# Patient Record
Sex: FEMALE | Race: WHITE | NOT HISPANIC OR LATINO | Employment: FULL TIME | URBAN - METROPOLITAN AREA
[De-identification: names, ages, dates, MRNs, and addresses within clinical notes are randomized per-mention and may not be internally consistent; named-entity substitution may affect disease eponyms.]

---

## 2020-06-23 ENCOUNTER — APPOINTMENT (OUTPATIENT)
Dept: CT IMAGING | Facility: HOSPITAL | Age: 20
End: 2020-06-23

## 2020-06-23 ENCOUNTER — APPOINTMENT (OUTPATIENT)
Dept: GENERAL RADIOLOGY | Facility: HOSPITAL | Age: 20
End: 2020-06-23

## 2020-06-23 ENCOUNTER — HOSPITAL ENCOUNTER (EMERGENCY)
Facility: HOSPITAL | Age: 20
Discharge: HOME | End: 2020-06-23
Admitting: EMERGENCY MEDICINE

## 2020-06-23 VITALS
HEART RATE: 71 BPM | HEIGHT: 64 IN | BODY MASS INDEX: 27.21 KG/M2 | DIASTOLIC BLOOD PRESSURE: 71 MMHG | OXYGEN SATURATION: 100 % | RESPIRATION RATE: 16 BRPM | TEMPERATURE: 98.3 F | WEIGHT: 159.39 LBS | SYSTOLIC BLOOD PRESSURE: 110 MMHG

## 2020-06-23 DIAGNOSIS — R55 SYNCOPE, UNSPECIFIED SYNCOPE TYPE: Primary | ICD-10-CM

## 2020-06-23 DIAGNOSIS — R42 DIZZINESS: ICD-10-CM

## 2020-06-23 LAB
ANION GAP SERPL CALCULATED.3IONS-SCNC: 16 MMOL/L (ref 5–15)
B-HCG UR QL: NEGATIVE
BACTERIA UR QL AUTO: ABNORMAL /HPF
BASOPHILS # BLD AUTO: 0.1 10*3/MM3 (ref 0–0.2)
BASOPHILS NFR BLD AUTO: 0.8 % (ref 0–1.5)
BILIRUB UR QL STRIP: NEGATIVE
BUN BLD-MCNC: 11 MG/DL (ref 6–20)
BUN BLD-MCNC: ABNORMAL MG/DL
BUN/CREAT SERPL: ABNORMAL
CALCIUM SPEC-SCNC: 9.9 MG/DL (ref 8.6–10.5)
CHLORIDE SERPL-SCNC: 101 MMOL/L (ref 98–107)
CLARITY UR: ABNORMAL
CO2 SERPL-SCNC: 23 MMOL/L (ref 22–29)
COLOR UR: YELLOW
CREAT BLD-MCNC: 0.59 MG/DL (ref 0.57–1)
DEPRECATED RDW RBC AUTO: 42 FL (ref 37–54)
EOSINOPHIL # BLD AUTO: 0 10*3/MM3 (ref 0–0.4)
EOSINOPHIL NFR BLD AUTO: 0.4 % (ref 0.3–6.2)
ERYTHROCYTE [DISTWIDTH] IN BLOOD BY AUTOMATED COUNT: 13.5 % (ref 12.3–15.4)
GFR SERPL CREATININE-BSD FRML MDRD: 129 ML/MIN/1.73
GLUCOSE BLD-MCNC: 84 MG/DL (ref 65–99)
GLUCOSE UR STRIP-MCNC: NEGATIVE MG/DL
HCT VFR BLD AUTO: 44.7 % (ref 34–46.6)
HGB BLD-MCNC: 15.4 G/DL (ref 12–15.9)
HGB UR QL STRIP.AUTO: NEGATIVE
HOLD SPECIMEN: NORMAL
HYALINE CASTS UR QL AUTO: ABNORMAL /LPF
KETONES UR QL STRIP: ABNORMAL
LEUKOCYTE ESTERASE UR QL STRIP.AUTO: ABNORMAL
LYMPHOCYTES # BLD AUTO: 2.6 10*3/MM3 (ref 0.7–3.1)
LYMPHOCYTES NFR BLD AUTO: 24.7 % (ref 19.6–45.3)
MCH RBC QN AUTO: 30.3 PG (ref 26.6–33)
MCHC RBC AUTO-ENTMCNC: 34.4 G/DL (ref 31.5–35.7)
MCV RBC AUTO: 88.1 FL (ref 79–97)
MONOCYTES # BLD AUTO: 0.8 10*3/MM3 (ref 0.1–0.9)
MONOCYTES NFR BLD AUTO: 7.3 % (ref 5–12)
NEUTROPHILS # BLD AUTO: 7 10*3/MM3 (ref 1.7–7)
NEUTROPHILS NFR BLD AUTO: 66.8 % (ref 42.7–76)
NITRITE UR QL STRIP: NEGATIVE
NRBC BLD AUTO-RTO: 0 /100 WBC (ref 0–0.2)
PH UR STRIP.AUTO: 7 [PH] (ref 5–8)
PLATELET # BLD AUTO: 305 10*3/MM3 (ref 140–450)
PMV BLD AUTO: 10.3 FL (ref 6–12)
POTASSIUM BLD-SCNC: 3.7 MMOL/L (ref 3.5–5.2)
PROT UR QL STRIP: NEGATIVE
RBC # BLD AUTO: 5.07 10*6/MM3 (ref 3.77–5.28)
RBC # UR: ABNORMAL /HPF
REF LAB TEST METHOD: ABNORMAL
SODIUM BLD-SCNC: 140 MMOL/L (ref 136–145)
SP GR UR STRIP: 1.01 (ref 1–1.03)
SQUAMOUS #/AREA URNS HPF: ABNORMAL /HPF
TROPONIN T SERPL-MCNC: <0.01 NG/ML (ref 0–0.03)
UROBILINOGEN UR QL STRIP: ABNORMAL
WBC NRBC COR # BLD: 10.5 10*3/MM3 (ref 3.4–10.8)
WBC UR QL AUTO: ABNORMAL /HPF

## 2020-06-23 PROCEDURE — 81025 URINE PREGNANCY TEST: CPT | Performed by: PHYSICIAN ASSISTANT

## 2020-06-23 PROCEDURE — 70450 CT HEAD/BRAIN W/O DYE: CPT

## 2020-06-23 PROCEDURE — 84484 ASSAY OF TROPONIN QUANT: CPT | Performed by: PHYSICIAN ASSISTANT

## 2020-06-23 PROCEDURE — 80048 BASIC METABOLIC PNL TOTAL CA: CPT | Performed by: PHYSICIAN ASSISTANT

## 2020-06-23 PROCEDURE — 99284 EMERGENCY DEPT VISIT MOD MDM: CPT

## 2020-06-23 PROCEDURE — 93005 ELECTROCARDIOGRAM TRACING: CPT | Performed by: PHYSICIAN ASSISTANT

## 2020-06-23 PROCEDURE — 81001 URINALYSIS AUTO W/SCOPE: CPT | Performed by: PHYSICIAN ASSISTANT

## 2020-06-23 PROCEDURE — 85025 COMPLETE CBC W/AUTO DIFF WBC: CPT | Performed by: PHYSICIAN ASSISTANT

## 2020-06-23 PROCEDURE — 71045 X-RAY EXAM CHEST 1 VIEW: CPT

## 2020-06-23 RX ORDER — SODIUM CHLORIDE 0.9 % (FLUSH) 0.9 %
10 SYRINGE (ML) INJECTION AS NEEDED
Status: DISCONTINUED | OUTPATIENT
Start: 2020-06-23 | End: 2020-06-23 | Stop reason: HOSPADM

## 2020-06-23 RX ORDER — MECLIZINE HYDROCHLORIDE 25 MG/1
25 TABLET ORAL 3 TIMES DAILY PRN
Qty: 15 TABLET | Refills: 0 | Status: SHIPPED | OUTPATIENT
Start: 2020-06-23 | End: 2020-09-17

## 2020-06-23 RX ORDER — MECLIZINE HYDROCHLORIDE 25 MG/1
25 TABLET ORAL ONCE
Status: COMPLETED | OUTPATIENT
Start: 2020-06-23 | End: 2020-06-23

## 2020-06-23 RX ADMIN — MECLIZINE HYDROCHLORIDE 25 MG: 25 TABLET ORAL at 14:34

## 2020-06-23 RX ADMIN — SODIUM CHLORIDE 1000 ML: 0.9 INJECTION, SOLUTION INTRAVENOUS at 14:35

## 2020-06-23 NOTE — ED PROVIDER NOTES
Subjective   History of Present Illness  Patient is a healthy 19-year-old female presents with complaints of dizziness and lightheadedness over the past week.  Patient states that it is constant feels like the room is spinning around no significant change of the dizziness with her position change.  Patient does report one syncopal episode 3 days ago states she had been feeling dizzy all day she was in her kitchen trying to make some food and started to get very nauseous and lightheaded she went to the bathroom thinking that she needed to vomit states that she was on her knees in front of the sink just wash cool water on her face in the  She remembers she woke up on the floor states she then had 2 episodes of vomiting afterwards and states she felt fine after.  She denies any urination or bowel movement on herself during her syncopal episode.  Patient also reports some associated intermittent nausea at times.  She reports some intermittent ear pain but denies any drainage that she is noted.  Patient denies any fever, body aches or chills, chest pain or shortness of breath, help palpitations, recent travel or change any medications.  Patient denies any headache, one-sided numbness weakness slurred speech or facial droop.  Review of Systems   Constitutional: Negative.    HENT: Positive for congestion and ear pain. Negative for dental problem, drooling, ear discharge, facial swelling, rhinorrhea, sinus pressure, sinus pain, sore throat and trouble swallowing.    Eyes: Negative for photophobia and visual disturbance.   Respiratory: Negative.    Cardiovascular: Negative.    Gastrointestinal: Positive for nausea. Negative for abdominal distention, abdominal pain, constipation, diarrhea and vomiting.   Musculoskeletal: Negative for neck pain and neck stiffness.   Skin: Negative.    Neurological: Positive for dizziness and light-headedness. Negative for tremors, seizures, syncope, facial asymmetry, speech difficulty,  weakness, numbness and headaches.       History reviewed. No pertinent past medical history.    Allergies   Allergen Reactions   • Penicillins Nausea And Vomiting and Unknown - Low Severity       Past Surgical History:   Procedure Laterality Date   • ADENOIDECTOMY     • DENTAL PROCEDURE     • MYRINGOTOMY W/ TUBES     • TONSILLECTOMY         History reviewed. No pertinent family history.    Social History     Socioeconomic History   • Marital status: Single     Spouse name: Not on file   • Number of children: Not on file   • Years of education: Not on file   • Highest education level: Not on file   Tobacco Use   • Smoking status: Current Every Day Smoker     Types: Cigarettes   Substance and Sexual Activity   • Alcohol use: Never     Frequency: Never   • Drug use: Yes     Frequency: 3.0 times per week     Types: Marijuana           Objective   Physical Exam   Constitutional: She is oriented to person, place, and time. She appears well-developed and well-nourished. No distress.   HENT:   Head: Normocephalic and atraumatic.   Right Ear: External ear normal. No lacerations. No drainage, swelling or tenderness. No foreign bodies. No mastoid tenderness. Tympanic membrane is scarred and retracted.   Left Ear: External ear normal. No drainage, swelling or tenderness. No mastoid tenderness. Tympanic membrane is not scarred, not perforated, not erythematous, not retracted and not bulging.   Mouth/Throat: Oropharynx is clear and moist. No oropharyngeal exudate.   Eyes: Pupils are equal, round, and reactive to light. EOM are normal. No scleral icterus.   Neck: Normal range of motion. No spinous process tenderness and no muscular tenderness present. No neck rigidity. Normal range of motion present.   Cardiovascular: Normal rate, regular rhythm, normal heart sounds and intact distal pulses. Exam reveals no gallop and no friction rub.   No murmur heard.  Pulmonary/Chest: Effort normal and breath sounds normal. No stridor. No  "respiratory distress. She has no wheezes. She has no rales.   Neurological: She is alert and oriented to person, place, and time. No cranial nerve deficit or sensory deficit. GCS eye subscore is 4. GCS verbal subscore is 5. GCS motor subscore is 6.   Normal and equal sensation strength throughout moving all extremities freely.   Skin: Skin is warm. Capillary refill takes less than 2 seconds. No rash noted. She is not diaphoretic.   Psychiatric: She has a normal mood and affect. Her behavior is normal.   Nursing note and vitals reviewed.      Procedures           ED Course    Blood pressure 138/77, pulse 76, temperature 98.1 °F (36.7 °C), temperature source Oral, resp. rate 14, height 162.6 cm (64\"), weight 72.3 kg (159 lb 6.3 oz), last menstrual period 06/02/2020, SpO2 99 %.    Medications   sodium chloride 0.9 % flush 10 mL (has no administration in time range)   sodium chloride 0.9 % bolus 1,000 mL (1,000 mL Intravenous New Bag 6/23/20 1435)   meclizine (ANTIVERT) tablet 25 mg (25 mg Oral Given 6/23/20 1434)     Labs Reviewed   BASIC METABOLIC PANEL - Abnormal; Notable for the following components:       Result Value    Anion Gap 16.0 (*)     All other components within normal limits    Narrative:     GFR Normal >60  Chronic Kidney Disease <60  Kidney Failure <15     URINALYSIS W/ MICROSCOPIC IF INDICATED (NO CULTURE) - Abnormal; Notable for the following components:    Appearance, UA Cloudy (*)     Ketones, UA Trace (*)     Leuk Esterase, UA Moderate (2+) (*)     All other components within normal limits   URINALYSIS, MICROSCOPIC ONLY - Abnormal; Notable for the following components:    WBC, UA 3-5 (*)     Squamous Epithelial Cells, UA 3-6 (*)     All other components within normal limits   PREGNANCY, URINE - Normal   TROPONIN (IN-HOUSE) - Normal    Narrative:     Troponin T Reference Range:  <= 0.03 ng/mL-   Negative for AMI  >0.03 ng/mL-     Abnormal for myocardial necrosis.  Clinicians would have to utilize " clinical acumen, EKG, Troponin and serial changes to determine if it is an Acute Myocardial Infarction or myocardial injury due to an underlying chronic condition.       Results may be falsely decreased if patient taking Biotin.     CBC WITH AUTO DIFFERENTIAL - Normal   BUN - Normal   CBC AND DIFFERENTIAL    Narrative:     The following orders were created for panel order CBC & Differential.  Procedure                               Abnormality         Status                     ---------                               -----------         ------                     CBC Auto Differential[[id number]]        Normal              Final result                 Please view results for these tests on the individual orders.   EXTRA TUBES    Narrative:     The following orders were created for panel order Extra Tubes.  Procedure                               Abnormality         Status                     ---------                               -----------         ------                     Gold Top - SST[[id number]]                                   Final result                 Please view results for these tests on the individual orders.   GOLD TOP - SST     Ct Head Without Contrast    Result Date: 6/23/2020  Normal CT of the brain without contrast.  Electronically Signed By-Dr. Shawna Varela MD On:6/23/2020 3:09 PM This report was finalized on [id number] by Dr. Shawna Varela MD.    Xr Chest 1 View    Result Date: 6/23/2020  No acute chest finding.  Electronically Signed By-Dr. Shawna Varela MD On:6/23/2020 2:26 PM This report was finalized on [id number] by Dr. Shawna Varela MD.                                           MDM  Number of Diagnoses or Management Options  Dizziness:   Syncope, unspecified syncope type:   Diagnosis management comments: Chart Review:  Comorbidity: None  ECG: Interpreted by myself and Dr. Delarosa showed sinus rhythm rate 64 with no ST elevation or other abnormalities  Labs: Urinalysis  unremarkable for UTI.  Troponin within normal limits.  Urine pregnancy negative.  CBC unremarkable.  BMP unremarkable as above.  Imaging: Was interpreted by physician and reviewed by myself:  Ct Head Without Contrast  Result Date: 6/23/2020  Normal CT of the brain without contrast.  Electronically Signed By-Dr. Shawna Varela MD On:6/23/2020 3:09 PM This report was finalized on [id number] by Dr. Shawna Varela MD.    Xr Chest 1 View  Result Date: 6/23/2020  No acute chest finding.  Electronically Signed By-Dr. Shawna Varela MD On:6/23/2020 2:26 PM This report was finalized on [id number] by Dr. Shawna Varela MD.    Disposition/Treatment:  Appropriate PPE was worn during exam and throughout all encounters with the patient.  While in the ED IV was placed and labs were obtained patient was afebrile and appeared nontoxic orthostatics were negative.  Patient was given fluids and meclizine upon reassessment patient is resting quietly does report some improvement in her dizziness.  Patient's right eardrum was retracted but there are no signs of severe infection she was advised to follow-up with [facility] for further evaluation and management could be cause of her dizziness.  Otherwise work-up was fairly unremarkable urinalysis showed no signs of UTI there is no signs of severe infection troponin and EKG chest x-ray were all unremarkable as above.  Patient be given meclizine for home.  Lab results and findings were discussed with the patient  who voiced understanding of discharge instructions along with signs and symptoms requiring return to the ED.  Upon discharged patient was in stable condition with followup for a revaluation.        Amount and/or Complexity of Data Reviewed  Clinical lab tests: reviewed  Tests in the radiology section of CPT®: reviewed  Tests in the medicine section of CPT®: reviewed        Final diagnoses:   Syncope, unspecified syncope type   Dizziness            Ramon Johnson,  PA  06/23/20 1550

## 2020-06-23 NOTE — DISCHARGE INSTRUCTIONS
Take meclizine as prescribed for your dizziness.  Drink plenty of clear fluids over the next 2 to 3 days.    Follow-up with your primary care provider in 3-5 days.  If you do not have a primary care provider call [phone] for help in finding one, or you may follow up with [facility] at [phone].    Follow-up with [facility] for further evaluation and management.    Return to ED for any new or worsening symptoms

## 2020-07-13 ENCOUNTER — OFFICE VISIT (OUTPATIENT)
Dept: FAMILY MEDICINE CLINIC | Facility: CLINIC | Age: 20
End: 2020-07-13

## 2020-07-13 VITALS
SYSTOLIC BLOOD PRESSURE: 139 MMHG | OXYGEN SATURATION: 98 % | HEART RATE: 64 BPM | TEMPERATURE: 99 F | BODY MASS INDEX: 26.52 KG/M2 | HEIGHT: 65 IN | DIASTOLIC BLOOD PRESSURE: 88 MMHG | RESPIRATION RATE: 16 BRPM | WEIGHT: 159.2 LBS

## 2020-07-13 DIAGNOSIS — R06.83 SNORING: ICD-10-CM

## 2020-07-13 DIAGNOSIS — F32.A DEPRESSION, UNSPECIFIED DEPRESSION TYPE: ICD-10-CM

## 2020-07-13 DIAGNOSIS — R55 SYNCOPE, UNSPECIFIED SYNCOPE TYPE: ICD-10-CM

## 2020-07-13 DIAGNOSIS — R55 VASOVAGAL SYNCOPE: ICD-10-CM

## 2020-07-13 DIAGNOSIS — Z11.8 SCREENING FOR CHLAMYDIAL DISEASE: ICD-10-CM

## 2020-07-13 DIAGNOSIS — R10.11 RIGHT UPPER QUADRANT ABDOMINAL PAIN: ICD-10-CM

## 2020-07-13 DIAGNOSIS — Z12.4 SCREENING FOR CERVICAL CANCER: ICD-10-CM

## 2020-07-13 DIAGNOSIS — Z91.89 ENCOUNTER FOR HEPATITIS C VIRUS SCREENING TEST FOR HIGH RISK PATIENT: ICD-10-CM

## 2020-07-13 DIAGNOSIS — Z13.1 SCREENING FOR DIABETES MELLITUS: ICD-10-CM

## 2020-07-13 DIAGNOSIS — Z00.01 ENCOUNTER FOR ANNUAL GENERAL MEDICAL EXAMINATION WITH ABNORMAL FINDINGS IN ADULT: Primary | ICD-10-CM

## 2020-07-13 DIAGNOSIS — H66.90 CHRONIC OTITIS MEDIA, UNSPECIFIED OTITIS MEDIA TYPE: ICD-10-CM

## 2020-07-13 DIAGNOSIS — K21.9 GASTROESOPHAGEAL REFLUX DISEASE, ESOPHAGITIS PRESENCE NOT SPECIFIED: ICD-10-CM

## 2020-07-13 DIAGNOSIS — Z11.59 ENCOUNTER FOR HEPATITIS C VIRUS SCREENING TEST FOR HIGH RISK PATIENT: ICD-10-CM

## 2020-07-13 DIAGNOSIS — R00.2 PALPITATIONS: ICD-10-CM

## 2020-07-13 DIAGNOSIS — R10.11 RUQ ABDOMINAL PAIN: ICD-10-CM

## 2020-07-13 DIAGNOSIS — Z11.4 SCREENING FOR HIV (HUMAN IMMUNODEFICIENCY VIRUS): ICD-10-CM

## 2020-07-13 DIAGNOSIS — Z13.220 SCREENING CHOLESTEROL LEVEL: ICD-10-CM

## 2020-07-13 PROBLEM — F41.9 ANXIETY: Status: ACTIVE | Noted: 2020-07-13

## 2020-07-13 LAB
ALBUMIN SERPL-MCNC: 4.9 G/DL (ref 3.5–5.2)
ALBUMIN/GLOB SERPL: 1.8 G/DL
ALP SERPL-CCNC: 61 U/L (ref 39–117)
ALT SERPL W P-5'-P-CCNC: 34 U/L (ref 1–33)
ANION GAP SERPL CALCULATED.3IONS-SCNC: 14.1 MMOL/L (ref 5–15)
AST SERPL-CCNC: 22 U/L (ref 1–32)
BASOPHILS # BLD AUTO: 0.09 10*3/MM3 (ref 0–0.2)
BASOPHILS NFR BLD AUTO: 0.9 % (ref 0–1.5)
BILIRUB SERPL-MCNC: 0.3 MG/DL (ref 0–1.2)
BUN SERPL-MCNC: 9 MG/DL (ref 6–20)
BUN/CREAT SERPL: 12.5 (ref 7–25)
CALCIUM SPEC-SCNC: 9.8 MG/DL (ref 8.6–10.5)
CHLORIDE SERPL-SCNC: 103 MMOL/L (ref 98–107)
CHOLEST SERPL-MCNC: 190 MG/DL (ref 0–200)
CO2 SERPL-SCNC: 26.9 MMOL/L (ref 22–29)
CREAT SERPL-MCNC: 0.72 MG/DL (ref 0.57–1)
DEPRECATED RDW RBC AUTO: 41.9 FL (ref 37–54)
EOSINOPHIL # BLD AUTO: 0.09 10*3/MM3 (ref 0–0.4)
EOSINOPHIL NFR BLD AUTO: 0.9 % (ref 0.3–6.2)
ERYTHROCYTE [DISTWIDTH] IN BLOOD BY AUTOMATED COUNT: 12.9 % (ref 12.3–15.4)
ERYTHROCYTE [SEDIMENTATION RATE] IN BLOOD: 10 MM/HR (ref 0–20)
GFR SERPL CREATININE-BSD FRML MDRD: 102 ML/MIN/1.73
GLOBULIN UR ELPH-MCNC: 2.8 GM/DL
GLUCOSE SERPL-MCNC: 96 MG/DL (ref 65–99)
HCT VFR BLD AUTO: 43.4 % (ref 34–46.6)
HCV AB SER DONR QL: NORMAL
HDLC SERPL-MCNC: 38 MG/DL (ref 40–60)
HGB BLD-MCNC: 15.1 G/DL (ref 12–15.9)
HIV1+2 AB SER QL: NORMAL
IMM GRANULOCYTES # BLD AUTO: 0.06 10*3/MM3 (ref 0–0.05)
IMM GRANULOCYTES NFR BLD AUTO: 0.6 % (ref 0–0.5)
LDLC SERPL CALC-MCNC: 126 MG/DL (ref 0–100)
LDLC/HDLC SERPL: 3.32 {RATIO}
LYMPHOCYTES # BLD AUTO: 2.27 10*3/MM3 (ref 0.7–3.1)
LYMPHOCYTES NFR BLD AUTO: 23.2 % (ref 19.6–45.3)
MAGNESIUM SERPL-MCNC: 2.2 MG/DL (ref 1.6–2.6)
MCH RBC QN AUTO: 31 PG (ref 26.6–33)
MCHC RBC AUTO-ENTMCNC: 34.8 G/DL (ref 31.5–35.7)
MCV RBC AUTO: 89.1 FL (ref 79–97)
MONOCYTES # BLD AUTO: 0.73 10*3/MM3 (ref 0.1–0.9)
MONOCYTES NFR BLD AUTO: 7.5 % (ref 5–12)
NEUTROPHILS NFR BLD AUTO: 6.54 10*3/MM3 (ref 1.7–7)
NEUTROPHILS NFR BLD AUTO: 66.9 % (ref 42.7–76)
NRBC BLD AUTO-RTO: 0 /100 WBC (ref 0–0.2)
PLATELET # BLD AUTO: 282 10*3/MM3 (ref 140–450)
PMV BLD AUTO: 13 FL (ref 6–12)
POTASSIUM SERPL-SCNC: 4.3 MMOL/L (ref 3.5–5.2)
PROT SERPL-MCNC: 7.7 G/DL (ref 6–8.5)
RBC # BLD AUTO: 4.87 10*6/MM3 (ref 3.77–5.28)
SODIUM SERPL-SCNC: 144 MMOL/L (ref 136–145)
TRIGL SERPL-MCNC: 130 MG/DL (ref 0–150)
TSH SERPL DL<=0.05 MIU/L-ACNC: 1.26 UIU/ML (ref 0.27–4.2)
VLDLC SERPL-MCNC: 26 MG/DL (ref 5–40)
WBC # BLD AUTO: 9.78 10*3/MM3 (ref 3.4–10.8)

## 2020-07-13 PROCEDURE — 86803 HEPATITIS C AB TEST: CPT | Performed by: PREVENTIVE MEDICINE

## 2020-07-13 PROCEDURE — G0432 EIA HIV-1/HIV-2 SCREEN: HCPCS | Performed by: PREVENTIVE MEDICINE

## 2020-07-13 PROCEDURE — 80061 LIPID PANEL: CPT | Performed by: PREVENTIVE MEDICINE

## 2020-07-13 PROCEDURE — 83735 ASSAY OF MAGNESIUM: CPT | Performed by: PREVENTIVE MEDICINE

## 2020-07-13 PROCEDURE — 85652 RBC SED RATE AUTOMATED: CPT | Performed by: PREVENTIVE MEDICINE

## 2020-07-13 PROCEDURE — 99213 OFFICE O/P EST LOW 20 MIN: CPT | Performed by: PREVENTIVE MEDICINE

## 2020-07-13 PROCEDURE — 80053 COMPREHEN METABOLIC PANEL: CPT | Performed by: PREVENTIVE MEDICINE

## 2020-07-13 PROCEDURE — 99395 PREV VISIT EST AGE 18-39: CPT | Performed by: PREVENTIVE MEDICINE

## 2020-07-13 PROCEDURE — 85025 COMPLETE CBC W/AUTO DIFF WBC: CPT | Performed by: PREVENTIVE MEDICINE

## 2020-07-13 PROCEDURE — 87491 CHLMYD TRACH DNA AMP PROBE: CPT | Performed by: PREVENTIVE MEDICINE

## 2020-07-13 PROCEDURE — 84443 ASSAY THYROID STIM HORMONE: CPT | Performed by: PREVENTIVE MEDICINE

## 2020-07-13 NOTE — PROGRESS NOTES
"Subjective   Gregory Baez is a 19 y.o. female presents for   Chief Complaint   Patient presents with   • Annual Exam     fasting    • Flank Pain     right side        Health Maintenance Due   Topic Date Due   • PNEUMOCOCCAL VACCINE (19-64 MEDIUM RISK) (1 of 1 - PPSV23) 09/12/2017   • HEPATITIS C SCREENING  07/09/2020   • CHLAMYDIA SCREENING  07/09/2020   • PAP SMEAR  07/09/2020       NPT with recent syncope W/U at [facility].  Many siblings that have passouts and no EP study done.  Also having palpitations.  Passed out at home in bathroom and unsure how long and later vomited.  History of sever ear problems with tubes and surgery to remove tonsils and still snores,  Told would have Severe Gerd after tonsill surgery as had to remove flap.  Also RUQ pain for several years that had neg Hidda but still pain after eating.  No persistent vomiting or stool change and no fever or weightloss.Hx depression but no SI or HI and can't take two antidepressants-one is venlaxafine       Vitals:    07/13/20 0934 07/13/20 0941   BP: 129/87 139/88   BP Location: Left arm Right arm   Patient Position: Sitting Sitting   Cuff Size: Adult Adult   Pulse: 64 64   Resp: 16    Temp: 99 °F (37.2 °C)    TempSrc: Infrared    SpO2: 98%    Weight: 72.2 kg (159 lb 3.2 oz)    Height: 164 cm (64.57\")      Body mass index is 26.85 kg/m².    Current Outpatient Medications on File Prior to Visit   Medication Sig Dispense Refill   • meclizine (ANTIVERT) 25 MG tablet Take 1 tablet by mouth 3 (Three) Times a Day As Needed for Dizziness. 15 tablet 0   • [DISCONTINUED] azithromycin (Zithromax Z-Reece) 250 MG tablet Take 2 tablets the first day, then 1 tablet daily for 4 days. 6 tablet 0     No current facility-administered medications on file prior to visit.        The following portions of the patient's history were reviewed and updated as appropriate: allergies, current medications, past family history, past medical history, past social history, past " surgical history and problem list.    Review of Systems   HENT: Positive for ear pain.    Cardiovascular: Positive for palpitations.   Gastrointestinal: Positive for abdominal pain and GERD.   Neurological: Positive for syncope.   Psychiatric/Behavioral: Positive for depressed mood.       Objective   Physical Exam   Constitutional: She is oriented to person, place, and time. She appears well-developed.   HENT:   Head: Normocephalic.   Retraction R and judy scars drums   Eyes: Pupils are equal, round, and reactive to light. Conjunctivae and EOM are normal.   Neck: Normal range of motion.   Cardiovascular: Normal rate and regular rhythm.   Pulmonary/Chest: Effort normal and breath sounds normal.   Abdominal: Soft. Bowel sounds are normal.   Musculoskeletal: Normal range of motion.   Lymphadenopathy:     She has no cervical adenopathy.   Neurological: She is alert and oriented to person, place, and time.   Skin: Skin is warm and dry.   Psychiatric: She has a normal mood and affect.   Nursing note and vitals reviewed.    PHQ-9 Total Score: 22    Assessment/Plan   Gregory was seen today for annual exam and flank pain.    Diagnoses and all orders for this visit:    Encounter for annual general medical examination with abnormal findings in adult  Comments:  Will get old records from PCP and [facility]'s   pap oin 6 weeks    Screening for HIV (human immunodeficiency virus)  -     HIV-1 & HIV-2 Antibodies  -     HIV-1 / O / 2 Ag / Antibody 4th Generation    Encounter for hepatitis C virus screening test for high risk patient  -     Hepatitis C Antibody    Screening for chlamydial disease  -     Chlamydia trachomatis, PCR - Urine, Urine, Clean Catch    Screening cholesterol level  -     Lipid Panel    Screening for diabetes mellitus  -     Comprehensive Metabolic Panel    Screening for cervical cancer    Syncope, unspecified syncope type  Comments:  F/U cardiology due to multiple family members with syncope and  CAD  Orders:  -     Sedimentation Rate; Future  -     Ambulatory Referral to Cardiology  -     Sedimentation Rate    Palpitations  -     CBC Auto Differential  -     TSH  -     Sedimentation Rate; Future  -     Magnesium  -     Ambulatory Referral to Cardiology  -     Sedimentation Rate    Chronic otitis media, unspecified otitis media type  -     Ambulatory Referral to ENT (Otolaryngology)    Snoring  -     Ambulatory Referral to ENT (Otolaryngology)    Right upper quadrant abdominal pain  Comments:  neg Hidda 2 years ago but sounds like GB  Orders:  -     US Gallbladder; Future    Vasovagal syncope    RUQ abdominal pain    Depression, unspecified depression type  Comments:  Has taken Venlaxafine and another med and problems    Gastroesophageal reflux disease, esophagitis presence not specified  Comments:  Flap removed with tonsillectomy and told GERD due to flap removal        Patient Instructions   Supervised water use only and no ladder work till syncopy eval complete.  Patient to check with insurance to see if they cover genesight testing.

## 2020-07-13 NOTE — PATIENT INSTRUCTIONS
Supervised water use only and no ladder work till syncopy eval complete.  Patient to check with insurance to see if they cover genesight testing.

## 2020-07-15 LAB — C TRACH RRNA SPEC DONR QL NAA+PROBE: NEGATIVE

## 2020-07-16 ENCOUNTER — OFFICE VISIT (OUTPATIENT)
Dept: FAMILY MEDICINE CLINIC | Facility: CLINIC | Age: 20
End: 2020-07-16

## 2020-07-16 VITALS
BODY MASS INDEX: 26.79 KG/M2 | OXYGEN SATURATION: 97 % | SYSTOLIC BLOOD PRESSURE: 130 MMHG | WEIGHT: 160.8 LBS | TEMPERATURE: 99.3 F | RESPIRATION RATE: 16 BRPM | HEIGHT: 65 IN | DIASTOLIC BLOOD PRESSURE: 86 MMHG | HEART RATE: 83 BPM

## 2020-07-16 DIAGNOSIS — H92.01 EAR PAIN, RIGHT: Primary | ICD-10-CM

## 2020-07-16 DIAGNOSIS — F32.A DEPRESSION, UNSPECIFIED DEPRESSION TYPE: ICD-10-CM

## 2020-07-16 DIAGNOSIS — R10.11 RUQ ABDOMINAL PAIN: ICD-10-CM

## 2020-07-16 PROCEDURE — 99214 OFFICE O/P EST MOD 30 MIN: CPT | Performed by: PREVENTIVE MEDICINE

## 2020-07-16 RX ORDER — CIPROFLOXACIN AND DEXAMETHASONE 3; 1 MG/ML; MG/ML
4 SUSPENSION/ DROPS AURICULAR (OTIC) 2 TIMES DAILY
Qty: 1 EACH | Refills: 0 | Status: SHIPPED | OUTPATIENT
Start: 2020-07-16 | End: 2020-08-20

## 2020-07-16 NOTE — PATIENT INSTRUCTIONS
Keep all sprays and fluids out of ear.  Cover in shower.  Use ear dr3ops one week only.  Call 1 week to report progress

## 2020-07-16 NOTE — PROGRESS NOTES
"Subjective   Gregory Baez is a 19 y.o. female presents for   Chief Complaint   Patient presents with   • Earache     pain and drainage        Health Maintenance Due   Topic Date Due   • PAP SMEAR  07/09/2020       Two day history of Severe R and maybe minimal L ear pain.  Got wax from R ear.  No temperature sore throat but R neck gland swollen.  Hasn't gotter US GB scheduled and having Abd pain, no vomiting.  Never had ear infection in past.   No diarrhea       Vitals:    07/16/20 1127 07/16/20 1129   BP: 135/84 130/86   BP Location: Right arm Left arm   Patient Position: Sitting Sitting   Cuff Size: Adult Adult   Pulse: 71 83   Resp: 16    Temp: 99.3 °F (37.4 °C)    TempSrc: Infrared    SpO2: 97%    Weight: 72.9 kg (160 lb 12.8 oz)    Height: 164 cm (64.57\")      Body mass index is 27.12 kg/m².    Current Outpatient Medications on File Prior to Visit   Medication Sig Dispense Refill   • meclizine (ANTIVERT) 25 MG tablet Take 1 tablet by mouth 3 (Three) Times a Day As Needed for Dizziness. 15 tablet 0     No current facility-administered medications on file prior to visit.        The following portions of the patient's history were reviewed and updated as appropriate: allergies, current medications, past family history, past medical history, past social history, past surgical history and problem list.    Review of Systems   Constitutional: Negative.  Negative for chills and fever.   HENT: Positive for ear discharge, ear pain and swollen glands. Negative for sinus pressure and sore throat.    Eyes: Negative.  Negative for blurred vision.   Respiratory: Negative.  Negative for cough and shortness of breath.    Cardiovascular: Negative.  Negative for chest pain.   Gastrointestinal: Positive for abdominal pain.   Endocrine: Negative.    Genitourinary: Negative.    Musculoskeletal: Negative.  Negative for arthralgias and joint swelling.   Skin: Negative.  Negative for color change.   Allergic/Immunologic: Positive " for environmental allergies.   Neurological: Negative.  Negative for dizziness.   Hematological: Negative.    Psychiatric/Behavioral: Negative.  Negative for behavioral problems.       Objective   Physical Exam   Constitutional: She is oriented to person, place, and time. She appears well-developed.   HENT:   Head: Normocephalic and atraumatic.   Left Ear: External ear normal.   R canal red.  Both drums normal.  Mild R pinna pain.  Tender R ant cervical adenopathy   Eyes: Pupils are equal, round, and reactive to light. Conjunctivae and EOM are normal.   Neck: Normal range of motion.   Cardiovascular: Normal rate and regular rhythm.   Pulmonary/Chest: Effort normal and breath sounds normal.   Abdominal: Soft. Bowel sounds are normal.   Musculoskeletal: Normal range of motion.   Lymphadenopathy:     She has no cervical adenopathy.   Neurological: She is alert and oriented to person, place, and time.   Skin: Skin is warm and dry.   Psychiatric: She has a normal mood and affect.   Nursing note and vitals reviewed.    PHQ-9 Total Score:      Assessment/Plan   Gregory was seen today for earache.    Diagnoses and all orders for this visit:    Ear pain, right  Comments:  Keep all fluid out of ear and apply drops for one week and no more and call 1 week to report progress    Depression, unspecified depression type  Comments:  No worse and no SI or HI    RUQ abdominal pain  Comments:  east't been able to schedule US GB-will have staff help as having pain last 2 days-no records from . 's yet    Other orders  -     ciprofloxacin-dexamethasone (CIPRODEX) 0.3-0.1 % otic suspension; Administer 4 drops into both ears 2 (Two) Times a Day.        Patient Instructions   Keep all sprays and fluids out of ear.  Cover in shower.  Use ear dr3ops one week only.  Call 1 week to report progress

## 2020-07-20 ENCOUNTER — HOSPITAL ENCOUNTER (OUTPATIENT)
Dept: ULTRASOUND IMAGING | Facility: HOSPITAL | Age: 20
Discharge: HOME | End: 2020-07-20
Admitting: PREVENTIVE MEDICINE

## 2020-07-20 DIAGNOSIS — R10.13 EPIGASTRIC PAIN: Primary | ICD-10-CM

## 2020-07-20 DIAGNOSIS — R10.11 RIGHT UPPER QUADRANT ABDOMINAL PAIN: ICD-10-CM

## 2020-07-20 PROCEDURE — 76705 ECHO EXAM OF ABDOMEN: CPT

## 2020-07-22 ENCOUNTER — OFFICE VISIT (OUTPATIENT)
Dept: CARDIOLOGY | Facility: CLINIC | Age: 20
End: 2020-07-22

## 2020-07-22 VITALS
HEART RATE: 74 BPM | BODY MASS INDEX: 26.16 KG/M2 | RESPIRATION RATE: 18 BRPM | WEIGHT: 157 LBS | HEIGHT: 65 IN | SYSTOLIC BLOOD PRESSURE: 126 MMHG | DIASTOLIC BLOOD PRESSURE: 77 MMHG

## 2020-07-22 DIAGNOSIS — R55 SYNCOPE AND COLLAPSE: Primary | ICD-10-CM

## 2020-07-22 DIAGNOSIS — R00.2 PALPITATIONS: ICD-10-CM

## 2020-07-22 PROCEDURE — 99204 OFFICE O/P NEW MOD 45 MIN: CPT | Performed by: INTERNAL MEDICINE

## 2020-07-22 NOTE — PROGRESS NOTES
Cardiology clinic note  José Luis Jackson MD, PhD  Subjective:     Encounter Date:07/22/2020      Patient ID: Gregory Baez is a 19 y.o. female.    Chief Complaint:  Chief Complaint   Patient presents with   • Palpitations   • Loss of Consciousness       HPI:  History of Present Illness  I had the pleasure to see this very pleasant 19-year-old female today who is here for reported syncopal events recurrently most recently 2 weeks ago.  She says she has multiple episodes of loss of consciousness sometimes preceded by prodrome.  She he does have some degree of orthostatic symptoms.  Multiple siblings have also had syncopal events in the past.  Her EKG is otherwise unremarkable with no high risk features, no QT prolongation, no conduction abnormality.  She has not had a tilt table test, 2D echo or ambulatory ECG monitoring to date.  Her blood pressure is essentially normal at 1 20-1 30 systolic and her heart rate is 70s sinus rhythm today.  She has no other congenital heart disease, no history of early onset CAD in herself or family members and otherwise she is healthy.  She has been treated for vertigo with meclizine but is a longer taking this.  Syncopal events are random sometimes occurring after getting up suddenly.  She has no bladder or bowel incontinence no strokelike symptoms and no seizure-like activity.    Review of systems x14 point review of systems is negative except was mentioned above    Historical data copied forward to previous encounters and EMR is unchanged      The following portions of the patient's history were reviewed and updated as appropriate: allergies, current medications, past family history, past medical history, past social history, past surgical history and problem list.    Problem List:  Patient Active Problem List   Diagnosis   • Vasovagal syncope   • RUQ abdominal pain   • Palpitations   • Depression   • Anxiety   • GERD (gastroesophageal reflux disease)       Past Medical  "History:  Past Medical History:   Diagnosis Date   • Anxiety    • Depression        Past Surgical History:  Past Surgical History:   Procedure Laterality Date   • ADENOIDECTOMY     • DENTAL PROCEDURE     • MYRINGOTOMY W/ TUBES     • TONSILLECTOMY         Social History:  Social History     Socioeconomic History   • Marital status: Single     Spouse name: Not on file   • Number of children: Not on file   • Years of education: Not on file   • Highest education level: Not on file   Tobacco Use   • Smoking status: Current Some Day Smoker     Types: Cigarettes   • Smokeless tobacco: Never Used   • Tobacco comment: she smokes when going to the bars    Substance and Sexual Activity   • Alcohol use: Yes     Alcohol/week: 8.0 - 10.0 standard drinks     Types: 6 - 8 Cans of beer, 2 Shots of liquor per week     Frequency: Monthly or less     Drinks per session: 7 to 9     Binge frequency: Less than monthly   • Drug use: Yes     Frequency: 3.0 times per week     Types: Marijuana   • Sexual activity: Yes     Partners: Male     Birth control/protection: Condom       Allergies:  Allergies   Allergen Reactions   • Penicillins Nausea And Vomiting and Unknown - Low Severity       Immunizations:  Immunization History   Administered Date(s) Administered   • DTaP 1998, 01/22/1999, 04/26/1999, 05/30/2000, 05/24/2004   • HPV Quadrivalent 08/17/2012, 09/14/2012, 02/25/2013   • Hepatitis A 06/11/2013, 07/08/2015   • Hepatitis B 1998, 1998, 04/26/1999   • HiB 1998, 01/22/1999, 04/26/1999, 05/03/2000   • IPV 1998, 01/22/1999, 09/16/1999, 05/24/2004   • MMR 09/16/1999, 05/24/2004   • Meningococcal, Unspecified 01/25/2011, 07/08/2015   • Pneumococcal, Unspecified 05/24/2004   • Tdap 09/14/2010   • Varicella 09/16/1999, 01/25/2011       ROS:  ROS       Objective:         /77 (BP Location: Left arm, Patient Position: Sitting)   Pulse 74   Resp 18   Ht 165.1 cm (65\")   Wt 71.2 kg (157 lb)   LMP 07/12/2020 " (Exact Date)   BMI 26.13 kg/m²     Physical Exam  Normocephalic atraumatic pupils equal round extraocular was intact bilaterally  Trachea midline is supple no carotid bruits no JVD  Regular rate and rhythm with no rubs murmurs or gallops normal PMI  Abdomen soft nontender understand bowel sounds positive  No clubbing cyanosis or edema  Clear to auscultation bilaterally  Neurologically grossly intact bilaterally  No acute distress alert and oriented x3  Normal mood and affect today  Skin warm and dry with normal cap refill  Pulses 2+ in all distributions  In-Office Procedure(s):  Procedures    ASCVD RIsk Score::  The ASCVD Risk score (Ang CROW Jr., et al., 2013) failed to calculate for the following reasons:    The 2013 ASCVD risk score is only valid for ages 40 to 79    Recent Radiology:  Imaging Results (Most Recent)     None          Lab Review:   Office Visit on 07/13/2020   Component Date Value   • WBC 07/13/2020 9.78    • RBC 07/13/2020 4.87    • Hemoglobin 07/13/2020 15.1    • Hematocrit 07/13/2020 43.4    • MCV 07/13/2020 89.1    • MCH 07/13/2020 31.0    • MCHC 07/13/2020 34.8    • RDW 07/13/2020 12.9    • RDW-SD 07/13/2020 41.9    • MPV 07/13/2020 13.0*   • Platelets 07/13/2020 282    • Neutrophil % 07/13/2020 66.9    • Lymphocyte % 07/13/2020 23.2    • Monocyte % 07/13/2020 7.5    • Eosinophil % 07/13/2020 0.9    • Basophil % 07/13/2020 0.9    • Immature Grans % 07/13/2020 0.6*   • Neutrophils, Absolute 07/13/2020 6.54    • Lymphocytes, Absolute 07/13/2020 2.27    • Monocytes, Absolute 07/13/2020 0.73    • Eosinophils, Absolute 07/13/2020 0.09    • Basophils, Absolute 07/13/2020 0.09    • Immature Grans, Absolute 07/13/2020 0.06*   • nRBC 07/13/2020 0.0    • Chlamydia trachomatis, N* 07/13/2020 Negative    • Glucose 07/13/2020 96    • BUN 07/13/2020 9    • Creatinine 07/13/2020 0.72    • Sodium 07/13/2020 144    • Potassium 07/13/2020 4.3    • Chloride 07/13/2020 103    • CO2 07/13/2020 26.9    • Calcium  07/13/2020 9.8    • Total Protein 07/13/2020 7.7    • Albumin 07/13/2020 4.90    • ALT (SGPT) 07/13/2020 34*   • AST (SGOT) 07/13/2020 22    • Alkaline Phosphatase 07/13/2020 61    • Total Bilirubin 07/13/2020 0.3    • eGFR Non African Amer 07/13/2020 102    • Globulin 07/13/2020 2.8    • A/G Ratio 07/13/2020 1.8    • BUN/Creatinine Ratio 07/13/2020 12.5    • Anion Gap 07/13/2020 14.1    • Hepatitis C Ab 07/13/2020 Non-Reactive    • Total Cholesterol 07/13/2020 190    • Triglycerides 07/13/2020 130    • HDL Cholesterol 07/13/2020 38*   • LDL Cholesterol  07/13/2020 126*   • VLDL Cholesterol 07/13/2020 26    • LDL/HDL Ratio 07/13/2020 3.32    • TSH 07/13/2020 1.260    • Magnesium 07/13/2020 2.2    • Sed Rate 07/13/2020 10    • HIV-1/ HIV-2 07/13/2020 Non-Reactive    Admission on 06/23/2020, Discharged on 06/23/2020   Component Date Value   • Glucose 06/23/2020 84    • BUN 06/23/2020     • Creatinine 06/23/2020 0.59    • Sodium 06/23/2020 140    • Potassium 06/23/2020 3.7    • Chloride 06/23/2020 101    • CO2 06/23/2020 23.0    • Calcium 06/23/2020 9.9    • eGFR Non  Amer 06/23/2020 129    • BUN/Creatinine Ratio 06/23/2020     • Anion Gap 06/23/2020 16.0*   • HCG, Urine QL 06/23/2020 Negative    • Color, UA 06/23/2020 Yellow    • Appearance, UA 06/23/2020 Cloudy*   • pH, UA 06/23/2020 7.0    • Specific Gravity, UA 06/23/2020 1.015    • Glucose, UA 06/23/2020 Negative    • Ketones, UA 06/23/2020 Trace*   • Bilirubin, UA 06/23/2020 Negative    • Blood, UA 06/23/2020 Negative    • Protein, UA 06/23/2020 Negative    • Leuk Esterase, UA 06/23/2020 Moderate (2+)*   • Nitrite, UA 06/23/2020 Negative    • Urobilinogen, UA 06/23/2020 0.2 E.U./dL    • Troponin T 06/23/2020 <0.010    • WBC 06/23/2020 10.50    • RBC 06/23/2020 5.07    • Hemoglobin 06/23/2020 15.4    • Hematocrit 06/23/2020 44.7    • MCV 06/23/2020 88.1    • MCH 06/23/2020 30.3    • MCHC 06/23/2020 34.4    • RDW 06/23/2020 13.5    • RDW-SD 06/23/2020 42.0     • MPV 06/23/2020 10.3    • Platelets 06/23/2020 305    • Neutrophil % 06/23/2020 66.8    • Lymphocyte % 06/23/2020 24.7    • Monocyte % 06/23/2020 7.3    • Eosinophil % 06/23/2020 0.4    • Basophil % 06/23/2020 0.8    • Neutrophils, Absolute 06/23/2020 7.00    • Lymphocytes, Absolute 06/23/2020 2.60    • Monocytes, Absolute 06/23/2020 0.80    • Eosinophils, Absolute 06/23/2020 0.00    • Basophils, Absolute 06/23/2020 0.10    • nRBC 06/23/2020 0.0    • Extra Tube 06/23/2020 Hold for add-ons.    • RBC, UA 06/23/2020 None Seen    • WBC, UA 06/23/2020 3-5*   • Bacteria, UA 06/23/2020 None Seen    • Squamous Epithelial Cell* 06/23/2020 3-6*   • Hyaline Casts, UA 06/23/2020 0-2    • Methodology 06/23/2020 Manual Light Microscopy    • BUN 06/23/2020 11                 Assessment:          Diagnosis Plan   1. Syncope and collapse  Adult Transthoracic Echo Complete W/ Cont if Necessary Per Protocol    Tilt Table    Holter Monitor - 72 Hour Up To 21 Days   2. Palpitations  Adult Transthoracic Echo Complete W/ Cont if Necessary Per Protocol    Tilt Table    Holter Monitor - 72 Hour Up To 21 Days          Plan:      Syncope and collapse  2D echo for structural and functional evaluation  Tilt table test for vasoreactivity  Ambulatory ECG monitoring to evaluate rhythm in the setting of recurrent dizziness  TSH and CMP standard labs, reflex T3-T4  Not on many medicines no degree of polypharmacy suspected  No substance use issues    Follow-up in 4 weeks for results and further recommendations to follow findings    It is a pleasure to be involved in her cardiovascular care.  Please call with any questions or concerns  José Luis Jackson MD, PhD                       José Luis Jackson MD  07/22/20  .

## 2020-08-03 ENCOUNTER — TELEPHONE (OUTPATIENT)
Dept: CARDIOLOGY | Facility: CLINIC | Age: 20
End: 2020-08-03

## 2020-08-03 ENCOUNTER — RESULTS ENCOUNTER (OUTPATIENT)
Dept: CARDIOLOGY | Facility: CLINIC | Age: 20
End: 2020-08-03

## 2020-08-03 ENCOUNTER — TELEPHONE (OUTPATIENT)
Dept: FAMILY MEDICINE CLINIC | Facility: CLINIC | Age: 20
End: 2020-08-03

## 2020-08-03 DIAGNOSIS — Z01.818 PRE-OP TESTING: Primary | ICD-10-CM

## 2020-08-03 DIAGNOSIS — Z34.90 PREGNANCY, UNSPECIFIED GESTATIONAL AGE: Primary | ICD-10-CM

## 2020-08-03 DIAGNOSIS — Z01.818 PRE-OP TESTING: ICD-10-CM

## 2020-08-03 NOTE — TELEPHONE ENCOUNTER
I spoke with the patient about this.  SHE WILL GO THE DAY BEFORE IN ORDER TO GET THE RESULTS BEFORE THE HIDDA SCAN

## 2020-08-03 NOTE — TELEPHONE ENCOUNTER
Her hidda scan is scheduled for Friday. Can she do her pregnancy test at the hospital since she lives in [place] so she does not have to drive down here for just a pee test.

## 2020-08-06 ENCOUNTER — LAB (OUTPATIENT)
Dept: LAB | Facility: HOSPITAL | Age: 20
End: 2020-08-06

## 2020-08-06 DIAGNOSIS — Z34.90 PREGNANCY, UNSPECIFIED GESTATIONAL AGE: ICD-10-CM

## 2020-08-06 LAB — B-HCG UR QL: NEGATIVE

## 2020-08-06 PROCEDURE — 81025 URINE PREGNANCY TEST: CPT

## 2020-08-07 ENCOUNTER — HOSPITAL ENCOUNTER (OUTPATIENT)
Dept: NUCLEAR MEDICINE | Facility: HOSPITAL | Age: 20
Discharge: HOME | End: 2020-08-07

## 2020-08-07 ENCOUNTER — TELEPHONE (OUTPATIENT)
Dept: FAMILY MEDICINE CLINIC | Facility: CLINIC | Age: 20
End: 2020-08-07

## 2020-08-07 DIAGNOSIS — R10.13 EPIGASTRIC PAIN: ICD-10-CM

## 2020-08-07 DIAGNOSIS — R55 SYNCOPE AND COLLAPSE: Primary | ICD-10-CM

## 2020-08-07 DIAGNOSIS — R00.2 PALPITATIONS: ICD-10-CM

## 2020-08-07 DIAGNOSIS — R10.11 RUQ ABDOMINAL PAIN: Primary | ICD-10-CM

## 2020-08-07 PROCEDURE — 0 TECHNETIUM TC 99M MEBROFENIN KIT: Performed by: PREVENTIVE MEDICINE

## 2020-08-07 PROCEDURE — A9537 TC99M MEBROFENIN: HCPCS | Performed by: PREVENTIVE MEDICINE

## 2020-08-07 PROCEDURE — 78227 HEPATOBIL SYST IMAGE W/DRUG: CPT

## 2020-08-07 RX ORDER — CALCIUM CARBONATE 200(500)MG
1 TABLET,CHEWABLE ORAL 2 TIMES DAILY PRN
COMMUNITY
End: 2021-08-07 | Stop reason: HOSPADM

## 2020-08-07 RX ORDER — ACETAMINOPHEN, ASPIRIN AND CAFFEINE 250; 250; 65 MG/1; MG/1; MG/1
1 TABLET, FILM COATED ORAL EVERY 8 HOURS PRN
COMMUNITY
End: 2021-08-07 | Stop reason: HOSPADM

## 2020-08-07 RX ORDER — KIT FOR THE PREPARATION OF TECHNETIUM TC 99M MEBROFENIN 45 MG/10ML
1 INJECTION, POWDER, LYOPHILIZED, FOR SOLUTION INTRAVENOUS
Status: COMPLETED | OUTPATIENT
Start: 2020-08-07 | End: 2020-08-07

## 2020-08-07 RX ADMIN — MEBROFENIN 1 DOSE: 45 INJECTION, POWDER, LYOPHILIZED, FOR SOLUTION INTRAVENOUS at 10:10

## 2020-08-07 NOTE — TELEPHONE ENCOUNTER
----- Message from Anna Kenney MD sent at 8/7/2020  1:14 PM EDT -----  Function GB looks normal-lets have her followup [facility]-ordered

## 2020-08-07 NOTE — TELEPHONE ENCOUNTER
Left message to return call     Function GB looks normal-lets have her followup [facility]-ordered    Also has a follow up her on Monday at 11

## 2020-08-08 ENCOUNTER — LAB (OUTPATIENT)
Dept: LAB | Facility: HOSPITAL | Age: 20
End: 2020-08-08

## 2020-08-08 PROCEDURE — U0002 COVID-19 LAB TEST NON-CDC: HCPCS | Performed by: INTERNAL MEDICINE

## 2020-08-08 PROCEDURE — C9803 HOPD COVID-19 SPEC COLLECT: HCPCS | Performed by: INTERNAL MEDICINE

## 2020-08-08 PROCEDURE — U0004 COV-19 TEST NON-CDC HGH THRU: HCPCS | Performed by: INTERNAL MEDICINE

## 2020-08-09 PROBLEM — R42 DIZZINESS: Status: ACTIVE | Noted: 2020-08-09

## 2020-08-10 ENCOUNTER — OFFICE VISIT (OUTPATIENT)
Dept: FAMILY MEDICINE CLINIC | Facility: CLINIC | Age: 20
End: 2020-08-10

## 2020-08-10 VITALS
HEART RATE: 79 BPM | WEIGHT: 161 LBS | SYSTOLIC BLOOD PRESSURE: 127 MMHG | BODY MASS INDEX: 26.82 KG/M2 | HEIGHT: 65 IN | OXYGEN SATURATION: 99 % | TEMPERATURE: 99 F | DIASTOLIC BLOOD PRESSURE: 83 MMHG | RESPIRATION RATE: 18 BRPM

## 2020-08-10 DIAGNOSIS — Z08 ENCOUNTER FOR FOLLOW-UP SURVEILLANCE OF CERVICAL CANCER: Primary | ICD-10-CM

## 2020-08-10 DIAGNOSIS — F32.A DEPRESSION, UNSPECIFIED DEPRESSION TYPE: ICD-10-CM

## 2020-08-10 DIAGNOSIS — Z85.41 ENCOUNTER FOR FOLLOW-UP SURVEILLANCE OF CERVICAL CANCER: Primary | ICD-10-CM

## 2020-08-10 DIAGNOSIS — R55 VASOVAGAL SYNCOPE: ICD-10-CM

## 2020-08-10 DIAGNOSIS — R10.11 RUQ ABDOMINAL PAIN: ICD-10-CM

## 2020-08-10 LAB
REF LAB TEST METHOD: NORMAL
SARS-COV-2 RNA RESP QL NAA+PROBE: NOT DETECTED

## 2020-08-10 PROCEDURE — 99213 OFFICE O/P EST LOW 20 MIN: CPT | Performed by: PREVENTIVE MEDICINE

## 2020-08-10 RX ORDER — OMEPRAZOLE 40 MG/1
40 CAPSULE, DELAYED RELEASE ORAL DAILY
Qty: 30 CAPSULE | Refills: 0 | Status: SHIPPED | OUTPATIENT
Start: 2020-08-10 | End: 2020-09-02

## 2020-08-11 ENCOUNTER — HOSPITAL ENCOUNTER (OUTPATIENT)
Dept: CARDIOLOGY | Facility: HOSPITAL | Age: 20
Discharge: HOME | End: 2020-08-11
Admitting: INTERNAL MEDICINE

## 2020-08-11 VITALS
HEART RATE: 64 BPM | DIASTOLIC BLOOD PRESSURE: 79 MMHG | SYSTOLIC BLOOD PRESSURE: 128 MMHG | OXYGEN SATURATION: 97 % | RESPIRATION RATE: 18 BRPM | HEIGHT: 64 IN | WEIGHT: 163.58 LBS | TEMPERATURE: 97.5 F | BODY MASS INDEX: 27.93 KG/M2

## 2020-08-11 DIAGNOSIS — R55 SYNCOPE AND COLLAPSE: ICD-10-CM

## 2020-08-11 DIAGNOSIS — R00.2 PALPITATIONS: ICD-10-CM

## 2020-08-11 LAB — BH CV TILT AGENT USED: NORMAL

## 2020-08-11 PROCEDURE — 93660 TILT TABLE EVALUATION: CPT | Performed by: INTERNAL MEDICINE

## 2020-08-11 PROCEDURE — 93660 TILT TABLE EVALUATION: CPT

## 2020-08-11 RX ORDER — NITROGLYCERIN 400 UG/1
1 SPRAY ORAL
Status: COMPLETED | OUTPATIENT
Start: 2020-08-11 | End: 2020-08-11

## 2020-08-11 RX ADMIN — NITROGLYCERIN 1 SPRAY: 400 SPRAY ORAL at 10:54

## 2020-08-11 NOTE — PROGRESS NOTES
"Subjective   Gregory Baez is a 19 y.o. female presents for   Chief Complaint   Patient presents with   • Palpitations   • Dizziness   • Anxiety   • Depression       Health Maintenance Due   Topic Date Due   • PAP SMEAR  07/09/2020   • INFLUENZA VACCINE  08/01/2020       Was to have PAP but on period but wants referral to counseling as doesn't want med for anxiety and depression.  No SI or HI       Vitals:    08/10/20 1100 08/10/20 1103   BP: 139/89 127/83   BP Location: Right arm Left arm   Patient Position: Sitting Sitting   Cuff Size: Adult Large Adult   Pulse: 84 79   Resp: 18    Temp: 99 °F (37.2 °C)    TempSrc: Temporal    SpO2: 99%    Weight: 73 kg (161 lb)    Height: 165.1 cm (65\")      Body mass index is 26.79 kg/m².    Current Outpatient Medications on File Prior to Visit   Medication Sig Dispense Refill   • ciprofloxacin-dexamethasone (CIPRODEX) 0.3-0.1 % otic suspension Administer 4 drops into both ears 2 (Two) Times a Day. 1 each 0   • meclizine (ANTIVERT) 25 MG tablet Take 1 tablet by mouth 3 (Three) Times a Day As Needed for Dizziness. 15 tablet 0     No current facility-administered medications on file prior to visit.        The following portions of the patient's history were reviewed and updated as appropriate: allergies, current medications, past family history, past medical history, past social history, past surgical history and problem list.    Review of Systems   Constitutional: Negative.    HENT: Negative.  Negative for sinus pressure and sore throat.    Eyes: Negative.    Respiratory: Negative.  Negative for cough.    Cardiovascular: Positive for palpitations.   Gastrointestinal: Positive for abdominal pain.   Endocrine: Negative.    Genitourinary: Negative.    Musculoskeletal: Negative.    Skin: Negative.    Allergic/Immunologic: Positive for environmental allergies.   Neurological: Positive for syncope.   Hematological: Negative.    Psychiatric/Behavioral: Positive for depressed mood. " The patient is nervous/anxious.        Objective   Physical Exam   Constitutional: She is oriented to person, place, and time. She appears well-developed.   HENT:   Head: Normocephalic and atraumatic.   Eyes: Pupils are equal, round, and reactive to light. Conjunctivae and EOM are normal.   Neck: Normal range of motion.   Cardiovascular: Normal rate and regular rhythm.   Pulmonary/Chest: Effort normal and breath sounds normal.   Abdominal: Soft. Bowel sounds are normal.   Musculoskeletal: Normal range of motion.   Lymphadenopathy:     She has no cervical adenopathy.   Neurological: She is alert and oriented to person, place, and time.   Skin: Skin is warm and dry.   Psychiatric: She has a normal mood and affect.   Nursing note and vitals reviewed.    PHQ-9 Total Score: 20    Assessment/Plan   Gregory was seen today for palpitations, dizziness, anxiety and depression.    Diagnoses and all orders for this visit:    Encounter for follow-up surveillance of cervical cancer  Comments:  Patient was to have PAP but started period 2 days ago    Vasovagal syncope  Comments:  No more spells W/U in progress    RUQ abdominal pain  Comments:  Referred to [facility] as GB function ok but got nausea from ensure    Depression, unspecified depression type  Comments:  Wants to see counselor and doesn't want meds-doesn't like the way they make her feels-referred to [facility]    Other orders  -     omeprazole (priLOSEC) 40 MG capsule; Take 1 capsule by mouth Daily.        Patient Instructions   Consider flu shot as soon as available.  Give patient [facility] in [place] info. Reschedule pap

## 2020-08-12 ENCOUNTER — APPOINTMENT (OUTPATIENT)
Dept: CARDIOLOGY | Facility: HOSPITAL | Age: 20
End: 2020-08-12

## 2020-08-18 ENCOUNTER — APPOINTMENT (OUTPATIENT)
Dept: CARDIOLOGY | Facility: HOSPITAL | Age: 20
End: 2020-08-18

## 2020-08-20 ENCOUNTER — OFFICE VISIT (OUTPATIENT)
Dept: FAMILY MEDICINE CLINIC | Facility: CLINIC | Age: 20
End: 2020-08-20

## 2020-08-20 VITALS
HEART RATE: 79 BPM | HEIGHT: 65 IN | DIASTOLIC BLOOD PRESSURE: 76 MMHG | BODY MASS INDEX: 26.29 KG/M2 | SYSTOLIC BLOOD PRESSURE: 126 MMHG | TEMPERATURE: 99.2 F | WEIGHT: 157.8 LBS | OXYGEN SATURATION: 98 % | RESPIRATION RATE: 16 BRPM

## 2020-08-20 DIAGNOSIS — F32.A DEPRESSION, UNSPECIFIED DEPRESSION TYPE: ICD-10-CM

## 2020-08-20 DIAGNOSIS — Z12.4 ENCOUNTER FOR SCREENING FOR CERVICAL CANCER: Primary | ICD-10-CM

## 2020-08-20 DIAGNOSIS — R00.2 PALPITATIONS: ICD-10-CM

## 2020-08-20 PROCEDURE — 99214 OFFICE O/P EST MOD 30 MIN: CPT | Performed by: PREVENTIVE MEDICINE

## 2020-08-20 NOTE — PROGRESS NOTES
"Subjective   Gregory Baez is a 19 y.o. female presents for   Chief Complaint   Patient presents with   • Gynecologic Exam   Waiting for cardiology and behavioralhealth.  Uses condems fpor pregnancyandSTI protection. Family history of Patrnal grandmotherwith breast cancer    Health Maintenance Due   Topic Date Due   • PAP SMEAR  07/09/2020   • INFLUENZA VACCINE  08/01/2020       History of Present Illness     Vitals:    08/20/20 1323 08/20/20 1329   BP: 124/79 126/76   BP Location: Right arm Left arm   Patient Position: Sitting Sitting   Cuff Size: Adult Adult   Pulse: 72 79   Resp: 16    Temp: 99.2 °F (37.3 °C)    TempSrc: Infrared    SpO2: 98%    Weight: 71.6 kg (157 lb 12.8 oz)    Height: 165.1 cm (65\")      Body mass index is 26.26 kg/m².    Current Outpatient Medications on File Prior to Visit   Medication Sig Dispense Refill   • aspirin-acetaminophen-caffeine (EXCEDRIN MIGRAINE) 250-250-65 MG per tablet Take 1 tablet by mouth Every 8 (Eight) Hours As Needed for Headache.     • omeprazole (priLOSEC) 40 MG capsule Take 1 capsule by mouth Daily. 30 capsule 0   • calcium carbonate (TUMS) 500 MG chewable tablet Chew 1 tablet 2 (Two) Times a Day As Needed for Indigestion or Heartburn.     • meclizine (ANTIVERT) 25 MG tablet Take 1 tablet by mouth 3 (Three) Times a Day As Needed for Dizziness. 15 tablet 0   • [DISCONTINUED] ciprofloxacin-dexamethasone (CIPRODEX) 0.3-0.1 % otic suspension Administer 4 drops into both ears 2 (Two) Times a Day. 1 each 0     No current facility-administered medications on file prior to visit.        The following portions of the patient's history were reviewed and updated as appropriate: allergies, current medications, past family history, past medical history, past social history, past surgical history and problem list.    Review of Systems   Constitutional: Negative.    HENT: Negative.  Negative for sinus pressure and sore throat.    Eyes: Negative.    Respiratory: Negative.  " Negative for cough.    Cardiovascular: Positive for palpitations.   Gastrointestinal: Negative.    Endocrine: Negative.    Genitourinary: Negative.    Musculoskeletal: Negative.    Skin: Negative.    Allergic/Immunologic: Positive for environmental allergies.   Neurological: Positive for dizziness and syncope.   Hematological: Negative.    Psychiatric/Behavioral: Negative.        Objective   Physical Exam   Constitutional: She is oriented to person, place, and time. She appears well-developed.   HENT:   Head: Normocephalic and atraumatic.   Eyes: Pupils are equal, round, and reactive to light. Conjunctivae and EOM are normal.   Neck: Normal range of motion.   Cardiovascular: Normal rate.   Abdominal: Soft. Bowel sounds are normal.   Genitourinary: Vagina normal and uterus normal.   Genitourinary Comments: RVconfirms   Musculoskeletal: Normal range of motion.   Lymphadenopathy:     She has no cervical adenopathy.   Neurological: She is alert and oriented to person, place, and time.   Skin: Skin is warm and dry.   Psychiatric: She has a normal mood and affect.   Nursing note and vitals reviewed.    PHQ-9 Total Score:      Assessment/Plan   Gregory was seen today for gynecologic exam.    Diagnoses and all orders for this visit:    Encounter for screening for cervical cancer     Palpitations  Comments:  Following upwith cardiologist    Depression, unspecified depression type  Comments:  Waiting forbehavioral health        Patient Instructions   Consider flu as soon as available.    Will call with PAP results

## 2020-08-21 ENCOUNTER — APPOINTMENT (OUTPATIENT)
Dept: CARDIOLOGY | Facility: HOSPITAL | Age: 20
End: 2020-08-21

## 2020-08-25 DIAGNOSIS — R87.612 LOW GRADE SQUAMOUS INTRAEPITHELIAL LESION ON CYTOLOGIC SMEAR OF CERVIX (LGSIL): Primary | ICD-10-CM

## 2020-08-25 LAB
AGE GDLN ACOG TESTING: NORMAL
CHROM ANALY OVERALL INTERP-IMP: ABNORMAL
CONV .: ABNORMAL
CONV .: ABNORMAL
CONV PERFORMED BY:: ABNORMAL
DX ICD CODE: ABNORMAL
HIV 1 & 2 AB SER-IMP: ABNORMAL
PATHOLOGIST NAME: ABNORMAL
PATHOLOGIST PROVIDED ICD-10:: ABNORMAL
REF LAB TEST METHOD: ABNORMAL
STAT OF ADQ CVX/VAG CYTO-IMP: ABNORMAL

## 2020-08-26 ENCOUNTER — TELEPHONE (OUTPATIENT)
Dept: FAMILY MEDICINE CLINIC | Facility: CLINIC | Age: 20
End: 2020-08-26

## 2020-08-26 NOTE — TELEPHONE ENCOUNTER
Left message to return call     PAP showed some low grade abnormality so advise she followup with GYN-referral placed

## 2020-08-26 NOTE — TELEPHONE ENCOUNTER
----- Message from Anna Kenney MD sent at 8/25/2020  6:31 PM EDT -----  PAP showed some low grade abnormality so advise she followup with GYN-referral placed

## 2020-08-26 NOTE — TELEPHONE ENCOUNTER
Pt called back and I gave her results. She is calling [facility] to get scheduled. Referral and pap results faxed to their office.

## 2020-09-02 RX ORDER — OMEPRAZOLE 40 MG/1
CAPSULE, DELAYED RELEASE ORAL
Qty: 30 CAPSULE | Refills: 0 | Status: SHIPPED | OUTPATIENT
Start: 2020-09-02 | End: 2020-10-01

## 2020-09-17 PROCEDURE — U0003 INFECTIOUS AGENT DETECTION BY NUCLEIC ACID (DNA OR RNA); SEVERE ACUTE RESPIRATORY SYNDROME CORONAVIRUS 2 (SARS-COV-2) (CORONAVIRUS DISEASE [COVID-19]), AMPLIFIED PROBE TECHNIQUE, MAKING USE OF HIGH THROUGHPUT TECHNOLOGIES AS DESCRIBED BY CMS-2020-01-R: HCPCS | Performed by: FAMILY MEDICINE

## 2020-09-18 ENCOUNTER — TELEPHONE (OUTPATIENT)
Dept: URGENT CARE | Facility: CLINIC | Age: 20
End: 2020-09-18

## 2020-10-01 RX ORDER — OMEPRAZOLE 40 MG/1
CAPSULE, DELAYED RELEASE ORAL
Qty: 30 CAPSULE | Refills: 0 | Status: SHIPPED | OUTPATIENT
Start: 2020-10-01 | End: 2020-11-06

## 2020-10-14 ENCOUNTER — OFFICE VISIT (OUTPATIENT)
Dept: CARDIOLOGY | Facility: CLINIC | Age: 20
End: 2020-10-14

## 2020-10-14 VITALS
HEART RATE: 87 BPM | RESPIRATION RATE: 18 BRPM | SYSTOLIC BLOOD PRESSURE: 104 MMHG | HEIGHT: 65 IN | BODY MASS INDEX: 26.46 KG/M2 | WEIGHT: 158.8 LBS | DIASTOLIC BLOOD PRESSURE: 78 MMHG

## 2020-10-14 DIAGNOSIS — R55 VASOVAGAL SYNCOPE: Primary | ICD-10-CM

## 2020-10-14 DIAGNOSIS — R00.2 PALPITATIONS: ICD-10-CM

## 2020-10-14 PROCEDURE — 99213 OFFICE O/P EST LOW 20 MIN: CPT | Performed by: INTERNAL MEDICINE

## 2020-10-22 ENCOUNTER — OFFICE (OUTPATIENT)
Dept: URBAN - METROPOLITAN AREA CLINIC 64 | Facility: CLINIC | Age: 20
End: 2020-10-22
Payer: COMMERCIAL

## 2020-10-22 VITALS
WEIGHT: 156 LBS | HEART RATE: 66 BPM | DIASTOLIC BLOOD PRESSURE: 77 MMHG | HEIGHT: 63 IN | SYSTOLIC BLOOD PRESSURE: 127 MMHG

## 2020-10-22 DIAGNOSIS — R10.11 RIGHT UPPER QUADRANT PAIN: ICD-10-CM

## 2020-10-22 DIAGNOSIS — K21.9 GASTRO-ESOPHAGEAL REFLUX DISEASE WITHOUT ESOPHAGITIS: ICD-10-CM

## 2020-10-22 DIAGNOSIS — K92.1 MELENA: ICD-10-CM

## 2020-10-22 DIAGNOSIS — K59.00 CONSTIPATION, UNSPECIFIED: ICD-10-CM

## 2020-10-22 DIAGNOSIS — R19.7 DIARRHEA, UNSPECIFIED: ICD-10-CM

## 2020-10-22 PROCEDURE — 99204 OFFICE O/P NEW MOD 45 MIN: CPT | Performed by: INTERNAL MEDICINE

## 2020-10-22 NOTE — PROGRESS NOTES
Cardiology clinic note  José Luis Jackson MD, PhD  Subjective:     Encounter Date:10/14/2020      Patient ID: Gregory Baez is a 20 y.o. female.    Chief Complaint:  Chief Complaint   Patient presents with   • Follow-up       HPI:  History of Present Illness  Previously I had the pleasure to see this very pleasant 19-year-old female today who is here for reported syncopal events recurrently most recently 2 weeks ago.  She says she has multiple episodes of loss of consciousness sometimes preceded by prodrome.  She he does have some degree of orthostatic symptoms.  Multiple siblings have also had syncopal events in the past.  Her EKG is otherwise unremarkable with no high risk features, no QT prolongation, no conduction abnormality.  She has not had a tilt table test, 2D echo or ambulatory ECG monitoring to date.  Her blood pressure is essentially normal at 1 20-1 30 systolic and her heart rate is 70s sinus rhythm today.  She has no other congenital heart disease, no history of early onset CAD in herself or family members and otherwise she is healthy.  She has been treated for vertigo with meclizine but is a longer taking this.  Syncopal events are random sometimes occurring after getting up suddenly.  She has no bladder or bowel incontinence no strokelike symptoms and no seizure-like activity.    Patient seen in clinic  Zio patch results revealed normal sinus rhythm with no tachycardia or bradycardia arrhythmias no pauses no abnormal features  Tilt table was performed with no drop in blood pressure or heart rate no vasovagal events either at baseline or provoked  2D echo was essentially unremarkable with preserved LV systolic function and no valvular abnormality no masses effusions or other concerns    She is had no recurrent syncopal events    She can follow-up with clinic as needed         Review of systems x14 point review of systems is negative except was mentioned above     Historical data copied forward to  previous encounters and EMR is unchanged  The following portions of the patient's history were reviewed and updated as appropriate: allergies, current medications, past family history, past medical history, past social history, past surgical history and problem list.    Problem List:  Patient Active Problem List   Diagnosis   • Vasovagal syncope   • RUQ abdominal pain   • Palpitations   • Depression   • Anxiety   • GERD (gastroesophageal reflux disease)   • Dizziness       Past Medical History:  Past Medical History:   Diagnosis Date   • Anxiety    • Depression    • Dizziness        Past Surgical History:  Past Surgical History:   Procedure Laterality Date   • ADENOIDECTOMY     • DENTAL PROCEDURE     • MYRINGOTOMY W/ TUBES     • TONSILLECTOMY         Social History:  Social History     Socioeconomic History   • Marital status: Single     Spouse name: Not on file   • Number of children: Not on file   • Years of education: Not on file   • Highest education level: Not on file   Tobacco Use   • Smoking status: Current Some Day Smoker     Types: Cigarettes   • Smokeless tobacco: Never Used   • Tobacco comment: smokes one cigarette every other day   Substance and Sexual Activity   • Alcohol use: Yes     Alcohol/week: 8.0 standard drinks     Types: 4 Cans of beer, 4 Shots of liquor per week     Frequency: Monthly or less     Drinks per session: 7 to 9     Binge frequency: Less than monthly   • Drug use: Yes     Frequency: 4.0 times per week     Types: Marijuana   • Sexual activity: Yes     Partners: Male     Birth control/protection: Condom       Allergies:  Allergies   Allergen Reactions   • Penicillins Nausea And Vomiting       Immunizations:  Immunization History   Administered Date(s) Administered   • DTaP 1998, 01/22/1999, 04/26/1999, 05/30/2000, 05/24/2004   • HPV Quadrivalent 08/17/2012, 09/14/2012, 02/25/2013   • Hepatitis A 06/11/2013, 07/08/2015   • Hepatitis B 2000, 2000, 04/26/1999   • HiB  "2000, 01/22/1999, 04/26/1999, 05/03/2000   • IPV 1998, 01/22/1999, 09/16/1999, 05/24/2004   • MMR 09/16/1999, 05/24/2004   • Meningococcal, Unspecified 01/25/2011, 07/08/2015   • Pneumococcal, Unspecified 05/24/2004   • Tdap 09/14/2010   • Varicella 09/16/1999, 01/25/2011       ROS:  ROS       Objective:         /78 (BP Location: Left arm, Patient Position: Sitting)   Pulse 87   Resp 18   Ht 165.1 cm (65\")   Wt 72 kg (158 lb 12.8 oz)   BMI 26.43 kg/m²     Physical Exam  Essentially unremarkable  Regular rate and rhythm no rubs murmurs or gallops  No clubbing cyanosis or edema  Pulses 2+  Regular rate and rhythm  Normocephalic atraumatic pupils equal round extraocular movements intact bilaterally  Trachea midline neck supple no carotid bruits no JVD  Clear to auscultation bilaterally  Soft nontender nondistended bowel sounds are positive  Skin warm and dry  Neurologic grossly intact bilaterally    In-Office Procedure(s):  Procedures    ASCVD RIsk Score::  The ASCVD Risk score (Grafton TRISTIN Jr., et al., 2013) failed to calculate for the following reasons:    The 2013 ASCVD risk score is only valid for ages 40 to 79    Recent Radiology:  Imaging Results (Most Recent)     None          Lab Review:   Admission on 09/17/2020, Discharged on 09/17/2020   Component Date Value   • SARS-CoV-2, KAVITA 09/17/2020 Not Detected    Office Visit on 08/20/2020   Component Date Value   • Age Gdln ACOG Testing 08/20/2020 21-29    • Diagnosis 08/20/2020 Comment*   • Specimen adequacy: 08/20/2020 Comment    • Clinician Provided ICD-1* 08/20/2020 Comment    • Performed by: 08/20/2020 Comment    • Electronically signed by: 08/20/2020 Comment    • . 08/20/2020 .    • Pathologist Provided ICD* 08/20/2020 Comment    • Note: 08/20/2020 Comment    • Method: 08/20/2020 Comment    • Conv .conv 08/20/2020 Comment    Hospital Outpatient Visit on 08/11/2020   Component Date Value   • Reference Lab Report 08/08/2020 Testing performed " by [facility]  [address] [place] [address]    • COVID19 08/08/2020 Not Detected    • Tilt Agent Used 08/11/2020 SL Nitroglycerin    Lab on 08/06/2020   Component Date Value   • HCG, Urine QL 08/06/2020 Negative    Office Visit on 07/13/2020   Component Date Value   • WBC 07/13/2020 9.78    • RBC 07/13/2020 4.87    • Hemoglobin 07/13/2020 15.1    • Hematocrit 07/13/2020 43.4    • MCV 07/13/2020 89.1    • MCH 07/13/2020 31.0    • MCHC 07/13/2020 34.8    • RDW 07/13/2020 12.9    • RDW-SD 07/13/2020 41.9    • MPV 07/13/2020 13.0*   • Platelets 07/13/2020 282    • Neutrophil % 07/13/2020 66.9    • Lymphocyte % 07/13/2020 23.2    • Monocyte % 07/13/2020 7.5    • Eosinophil % 07/13/2020 0.9    • Basophil % 07/13/2020 0.9    • Immature Grans % 07/13/2020 0.6*   • Neutrophils, Absolute 07/13/2020 6.54    • Lymphocytes, Absolute 07/13/2020 2.27    • Monocytes, Absolute 07/13/2020 0.73    • Eosinophils, Absolute 07/13/2020 0.09    • Basophils, Absolute 07/13/2020 0.09    • Immature Grans, Absolute 07/13/2020 0.06*   • nRBC 07/13/2020 0.0    • Chlamydia trachomatis, N* 07/13/2020 Negative    • Glucose 07/13/2020 96    • BUN 07/13/2020 9    • Creatinine 07/13/2020 0.72    • Sodium 07/13/2020 144    • Potassium 07/13/2020 4.3    • Chloride 07/13/2020 103    • CO2 07/13/2020 26.9    • Calcium 07/13/2020 9.8    • Total Protein 07/13/2020 7.7    • Albumin 07/13/2020 4.90    • ALT (SGPT) 07/13/2020 34*   • AST (SGOT) 07/13/2020 22    • Alkaline Phosphatase 07/13/2020 61    • Total Bilirubin 07/13/2020 0.3    • eGFR Non African Amer 07/13/2020 102    • Globulin 07/13/2020 2.8    • A/G Ratio 07/13/2020 1.8    • BUN/Creatinine Ratio 07/13/2020 12.5    • Anion Gap 07/13/2020 14.1    • Hepatitis C Ab 07/13/2020 Non-Reactive    • Total Cholesterol 07/13/2020 190    • Triglycerides 07/13/2020 130    • HDL Cholesterol 07/13/2020 38*   • LDL Cholesterol  07/13/2020 126*   • VLDL Cholesterol 07/13/2020 26    • LDL/HDL  Ratio 07/13/2020 3.32    • TSH 07/13/2020 1.260    • Magnesium 07/13/2020 2.2    • Sed Rate 07/13/2020 10    • HIV-1/ HIV-2 07/13/2020 Non-Reactive    Admission on 06/23/2020, Discharged on 06/23/2020   Component Date Value   • Glucose 06/23/2020 84    • BUN 06/23/2020     • Creatinine 06/23/2020 0.59    • Sodium 06/23/2020 140    • Potassium 06/23/2020 3.7    • Chloride 06/23/2020 101    • CO2 06/23/2020 23.0    • Calcium 06/23/2020 9.9    • eGFR Non  Amer 06/23/2020 129    • BUN/Creatinine Ratio 06/23/2020     • Anion Gap 06/23/2020 16.0*   • HCG, Urine QL 06/23/2020 Negative    • Color, UA 06/23/2020 Yellow    • Appearance, UA 06/23/2020 Cloudy*   • pH, UA 06/23/2020 7.0    • Specific Gravity, UA 06/23/2020 1.015    • Glucose, UA 06/23/2020 Negative    • Ketones, UA 06/23/2020 Trace*   • Bilirubin, UA 06/23/2020 Negative    • Blood, UA 06/23/2020 Negative    • Protein, UA 06/23/2020 Negative    • Leuk Esterase, UA 06/23/2020 Moderate (2+)*   • Nitrite, UA 06/23/2020 Negative    • Urobilinogen, UA 06/23/2020 0.2 E.U./dL    • Troponin T 06/23/2020 <0.010    • WBC 06/23/2020 10.50    • RBC 06/23/2020 5.07    • Hemoglobin 06/23/2020 15.4    • Hematocrit 06/23/2020 44.7    • MCV 06/23/2020 88.1    • MCH 06/23/2020 30.3    • MCHC 06/23/2020 34.4    • RDW 06/23/2020 13.5    • RDW-SD 06/23/2020 42.0    • MPV 06/23/2020 10.3    • Platelets 06/23/2020 305    • Neutrophil % 06/23/2020 66.8    • Lymphocyte % 06/23/2020 24.7    • Monocyte % 06/23/2020 7.3    • Eosinophil % 06/23/2020 0.4    • Basophil % 06/23/2020 0.8    • Neutrophils, Absolute 06/23/2020 7.00    • Lymphocytes, Absolute 06/23/2020 2.60    • Monocytes, Absolute 06/23/2020 0.80    • Eosinophils, Absolute 06/23/2020 0.00    • Basophils, Absolute 06/23/2020 0.10    • nRBC 06/23/2020 0.0    • Extra Tube 06/23/2020 Hold for add-ons.    • RBC, UA 06/23/2020 None Seen    • WBC, UA 06/23/2020 3-5*   • Bacteria, UA 06/23/2020 None Seen    • Squamous Epithelial  Cell* 06/23/2020 3-6*   • Hyaline Casts, UA 06/23/2020 0-2    • Methodology 06/23/2020 Manual Light Microscopy    • BUN 06/23/2020 11                 Assessment:         No diagnosis found.       Plan:        Palpitations, syncope, tachycardia  Zio patch, tilt table, 2D echo all within normal limits  No indication for medical therapy at this time  Possibly provoked by anxiety  Organic cardiac etiology of primary nature found  Low risk for coronary disease  Normal ECG  Smoking cessation counseling  Primary prevention goals for CAD and comorbidities      Appears to be in excellent cardiovascular health  Follow-up with cardiology as needed      Please call with any questions or concerns  José Luis Jackson MD, PhD                 José Luis Jackson MD  10/22/20  .

## 2020-11-06 RX ORDER — OMEPRAZOLE 40 MG/1
CAPSULE, DELAYED RELEASE ORAL
Qty: 30 CAPSULE | Refills: 0 | Status: SHIPPED | OUTPATIENT
Start: 2020-11-06 | End: 2020-11-20 | Stop reason: SINTOL

## 2020-11-19 PROBLEM — E78.49 OTHER HYPERLIPIDEMIA: Status: ACTIVE | Noted: 2020-11-19

## 2020-11-19 NOTE — PATIENT INSTRUCTIONS
Health Maintenance Due   Topic Date Due   • Pneumococcal Vaccine 0-64 (1 of 1 - PPSV23) 09/12/2004   • INFLUENZA VACCINE  08/01/2020   • TDAP/TD VACCINES (2 - Td) 09/14/2020     12 hour fast for labs    Balance carbs and proteins

## 2020-11-20 ENCOUNTER — OFFICE VISIT (OUTPATIENT)
Dept: FAMILY MEDICINE CLINIC | Facility: CLINIC | Age: 20
End: 2020-11-20

## 2020-11-20 VITALS
DIASTOLIC BLOOD PRESSURE: 80 MMHG | HEIGHT: 65 IN | HEART RATE: 79 BPM | WEIGHT: 157.8 LBS | OXYGEN SATURATION: 98 % | SYSTOLIC BLOOD PRESSURE: 121 MMHG | TEMPERATURE: 98.2 F | RESPIRATION RATE: 16 BRPM | BODY MASS INDEX: 26.29 KG/M2

## 2020-11-20 DIAGNOSIS — R10.11 RUQ ABDOMINAL PAIN: Primary | ICD-10-CM

## 2020-11-20 DIAGNOSIS — R00.2 PALPITATIONS: ICD-10-CM

## 2020-11-20 DIAGNOSIS — F41.9 ANXIETY: ICD-10-CM

## 2020-11-20 DIAGNOSIS — E78.5 HYPERLIPIDEMIA, UNSPECIFIED HYPERLIPIDEMIA TYPE: ICD-10-CM

## 2020-11-20 DIAGNOSIS — R06.83 SNORING: ICD-10-CM

## 2020-11-20 DIAGNOSIS — R55 VASOVAGAL SYNCOPE: ICD-10-CM

## 2020-11-20 DIAGNOSIS — R53.83 OTHER FATIGUE: ICD-10-CM

## 2020-11-20 DIAGNOSIS — K21.9 GASTROESOPHAGEAL REFLUX DISEASE, UNSPECIFIED WHETHER ESOPHAGITIS PRESENT: ICD-10-CM

## 2020-11-20 DIAGNOSIS — F32.A DEPRESSION, UNSPECIFIED DEPRESSION TYPE: ICD-10-CM

## 2020-11-20 LAB
B-HCG UR QL: NEGATIVE
INTERNAL NEGATIVE CONTROL: NEGATIVE
INTERNAL POSITIVE CONTROL: POSITIVE
Lab: NORMAL

## 2020-11-20 PROCEDURE — 81025 URINE PREGNANCY TEST: CPT | Performed by: PREVENTIVE MEDICINE

## 2020-11-20 PROCEDURE — 99214 OFFICE O/P EST MOD 30 MIN: CPT | Performed by: PREVENTIVE MEDICINE

## 2020-11-20 RX ORDER — FAMOTIDINE 40 MG/1
40 TABLET, FILM COATED ORAL DAILY
Qty: 30 TABLET | Refills: 1 | Status: SHIPPED | OUTPATIENT
Start: 2020-11-20 | End: 2021-01-28

## 2020-11-20 NOTE — PROGRESS NOTES
"Subjective   Gregory Baez is a 20 y.o. female presents for   Chief Complaint   Patient presents with   • Anxiety     not fasting    • Depression   • Hyperlipidemia       Health Maintenance Due   Topic Date Due   • Pneumococcal Vaccine 0-64 (1 of 1 - PPSV23) 09/12/2004   • INFLUENZA VACCINE  08/01/2020   • TDAP/TD VACCINES (2 - Td) 09/14/2020       Fatique- feels like low sugars at times and gets sweaty.  Family history DM..  Is snoring.  Depression improved.  Still some anxiety.       Vitals:    11/20/20 1254 11/20/20 1257   BP: 131/89 121/80   BP Location: Right arm Left arm   Patient Position: Sitting Sitting   Cuff Size: Adult Adult   Pulse: 75 79   Resp: 16    Temp: 98.2 °F (36.8 °C)    TempSrc: Infrared    SpO2: 98%    Weight: 71.6 kg (157 lb 12.8 oz)    Height: 165.1 cm (65\")      Body mass index is 26.26 kg/m².    Current Outpatient Medications on File Prior to Visit   Medication Sig Dispense Refill   • aspirin-acetaminophen-caffeine (EXCEDRIN MIGRAINE) 250-250-65 MG per tablet Take 1 tablet by mouth Every 8 (Eight) Hours As Needed for Headache.     • calcium carbonate (TUMS) 500 MG chewable tablet Chew 1 tablet 2 (Two) Times a Day As Needed for Indigestion or Heartburn.     • [DISCONTINUED] omeprazole (priLOSEC) 40 MG capsule TAKE 1 CAPSULE BY MOUTH EVERY DAY 30 capsule 0     No current facility-administered medications on file prior to visit.        The following portions of the patient's history were reviewed and updated as appropriate: allergies, current medications, past family history, past medical history, past social history, past surgical history and problem list.    Review of Systems   Constitutional: Positive for fatigue.   HENT: Negative.  Negative for sinus pressure and sore throat.    Eyes: Negative.    Respiratory: Negative.  Negative for cough.    Cardiovascular: Negative.    Gastrointestinal: Positive for constipation.   Endocrine: Negative.    Genitourinary: Negative.         [place] " period than usual.  Non protected intercourse. Advised to use protection   Musculoskeletal: Negative.    Skin: Negative.    Allergic/Immunologic: Positive for environmental allergies.   Neurological: Positive for dizziness and light-headedness.   Hematological: Negative.    Psychiatric/Behavioral: The patient is nervous/anxious.        Objective   Physical Exam  Vitals signs reviewed.   Constitutional:       General: She is not in acute distress.     Appearance: Normal appearance. She is well-developed. She is not ill-appearing or toxic-appearing.   HENT:      Head: Normocephalic and atraumatic.      Right Ear: Tympanic membrane, ear canal and external ear normal.      Left Ear: Tympanic membrane, ear canal and external ear normal.      Nose: Nose normal.   Eyes:      Extraocular Movements: Extraocular movements intact.      Conjunctiva/sclera: Conjunctivae normal.      Pupils: Pupils are equal, round, and reactive to light.   Neck:      Musculoskeletal: Neck supple.   Cardiovascular:      Rate and Rhythm: Normal rate and regular rhythm.      Heart sounds: Normal heart sounds.   Pulmonary:      Effort: Pulmonary effort is normal.      Breath sounds: Normal breath sounds.   Abdominal:      General: Bowel sounds are normal. There is no distension.      Palpations: Abdomen is soft. There is no mass.      Tenderness: There is no abdominal tenderness.   Musculoskeletal: Normal range of motion.   Skin:     General: Skin is warm.   Neurological:      General: No focal deficit present.      Mental Status: She is alert and oriented to person, place, and time.   Psychiatric:         Mood and Affect: Mood normal.         Behavior: Behavior normal.       PHQ-9 Total Score: 0    Assessment/Plan   Diagnoses and all orders for this visit:    1. RUQ abdominal pain (Primary)  Comments:  Gastroenterology is W/U  EGDand colonoscopy pending    2. Palpitations  Comments:  Improved.  CardiologyW/U    3. Depression, unspecified depression  type    4. Vasovagal syncope  Comments:  No passouts    5. Anxiety    6. Gastroesophageal reflux disease, unspecified whether esophagitis present  Comments:  Omeprazole constipated.  Trial Famotidine    7. Snoring  Comments:  Sleep study  Orders:  -     Ambulatory Referral to Sleep Medicine    8. Other fatigue  -     POCT pregnancy, urine    9. Hyperlipidemia, unspecified hyperlipidemia type  -     Lipid Panel    Other orders  -     famotidine (PEPCID) 40 MG tablet; Take 1 tablet by mouth Daily.  Dispense: 30 tablet; Refill: 1        Patient Instructions     Health Maintenance Due   Topic Date Due   • Pneumococcal Vaccine 0-64 (1 of 1 - PPSV23) 09/12/2004   • INFLUENZA VACCINE  08/01/2020   • TDAP/TD VACCINES (2 - Td) 09/14/2020     12 hour fast for labs    Balance carbs and proteins

## 2020-11-24 RX ORDER — CETIRIZINE HYDROCHLORIDE 10 MG/1
10 TABLET ORAL EVERY EVENING
COMMUNITY
End: 2021-08-07 | Stop reason: HOSPADM

## 2020-11-25 ENCOUNTER — LAB (OUTPATIENT)
Dept: LAB | Facility: HOSPITAL | Age: 20
End: 2020-11-25

## 2020-11-25 LAB
CHOLEST SERPL-MCNC: 196 MG/DL (ref 0–200)
HDLC SERPL-MCNC: 42 MG/DL (ref 40–60)
LDLC SERPL CALC-MCNC: 134 MG/DL (ref 0–100)
LDLC/HDLC SERPL: 3.14 {RATIO}
TRIGL SERPL-MCNC: 110 MG/DL (ref 0–150)
VLDLC SERPL-MCNC: 20 MG/DL (ref 5–40)

## 2020-11-25 PROCEDURE — 80061 LIPID PANEL: CPT | Performed by: PREVENTIVE MEDICINE

## 2020-12-01 ENCOUNTER — LAB (OUTPATIENT)
Dept: LAB | Facility: HOSPITAL | Age: 20
End: 2020-12-01

## 2020-12-01 PROCEDURE — C9803 HOPD COVID-19 SPEC COLLECT: HCPCS

## 2020-12-01 PROCEDURE — U0004 COV-19 TEST NON-CDC HGH THRU: HCPCS

## 2020-12-02 LAB — SARS-COV-2 RNA RESP QL NAA+PROBE: NOT DETECTED

## 2020-12-03 ENCOUNTER — ANESTHESIA EVENT (OUTPATIENT)
Dept: GASTROENTEROLOGY | Facility: HOSPITAL | Age: 20
End: 2020-12-03

## 2020-12-03 ENCOUNTER — ANESTHESIA (OUTPATIENT)
Dept: GASTROENTEROLOGY | Facility: HOSPITAL | Age: 20
End: 2020-12-03

## 2020-12-03 ENCOUNTER — ON CAMPUS - OUTPATIENT (OUTPATIENT)
Dept: URBAN - METROPOLITAN AREA HOSPITAL 85 | Facility: HOSPITAL | Age: 20
End: 2020-12-03
Payer: COMMERCIAL

## 2020-12-03 ENCOUNTER — HOSPITAL ENCOUNTER (OUTPATIENT)
Facility: HOSPITAL | Age: 20
Setting detail: HOSPITAL OUTPATIENT SURGERY
Discharge: HOME | End: 2020-12-03
Attending: INTERNAL MEDICINE | Admitting: INTERNAL MEDICINE

## 2020-12-03 VITALS
BODY MASS INDEX: 26.84 KG/M2 | HEIGHT: 64 IN | RESPIRATION RATE: 15 BRPM | DIASTOLIC BLOOD PRESSURE: 77 MMHG | WEIGHT: 157.19 LBS | TEMPERATURE: 97.9 F | HEART RATE: 69 BPM | SYSTOLIC BLOOD PRESSURE: 119 MMHG | OXYGEN SATURATION: 99 %

## 2020-12-03 DIAGNOSIS — K59.00 CONSTIPATION, UNSPECIFIED: ICD-10-CM

## 2020-12-03 DIAGNOSIS — K29.80 DUODENITIS WITHOUT BLEEDING: ICD-10-CM

## 2020-12-03 DIAGNOSIS — K59.00 CONSTIPATION: ICD-10-CM

## 2020-12-03 DIAGNOSIS — R10.13 EPIGASTRIC PAIN: ICD-10-CM

## 2020-12-03 DIAGNOSIS — K21.9 GASTRO-ESOPHAGEAL REFLUX DISEASE WITHOUT ESOPHAGITIS: ICD-10-CM

## 2020-12-03 DIAGNOSIS — R10.84 ABDOMINAL PAIN, GENERALIZED: ICD-10-CM

## 2020-12-03 DIAGNOSIS — K21.9 GERD (GASTROESOPHAGEAL REFLUX DISEASE): ICD-10-CM

## 2020-12-03 DIAGNOSIS — R10.84 GENERALIZED ABDOMINAL PAIN: ICD-10-CM

## 2020-12-03 PROCEDURE — 88305 TISSUE EXAM BY PATHOLOGIST: CPT | Performed by: INTERNAL MEDICINE

## 2020-12-03 PROCEDURE — 43239 EGD BIOPSY SINGLE/MULTIPLE: CPT | Performed by: INTERNAL MEDICINE

## 2020-12-03 PROCEDURE — 25010000002 ONDANSETRON PER 1 MG: Performed by: ANESTHESIOLOGY

## 2020-12-03 PROCEDURE — 25010000002 PROPOFOL 10 MG/ML EMULSION: Performed by: ANESTHESIOLOGY

## 2020-12-03 PROCEDURE — 45380 COLONOSCOPY AND BIOPSY: CPT | Performed by: INTERNAL MEDICINE

## 2020-12-03 RX ORDER — LIDOCAINE HYDROCHLORIDE 10 MG/ML
INJECTION, SOLUTION EPIDURAL; INFILTRATION; INTRACAUDAL; PERINEURAL AS NEEDED
Status: DISCONTINUED | OUTPATIENT
Start: 2020-12-03 | End: 2020-12-03 | Stop reason: SURG

## 2020-12-03 RX ORDER — SODIUM CHLORIDE 9 MG/ML
INJECTION, SOLUTION INTRAVENOUS CONTINUOUS PRN
Status: DISCONTINUED | OUTPATIENT
Start: 2020-12-03 | End: 2020-12-03 | Stop reason: SURG

## 2020-12-03 RX ORDER — ONDANSETRON 2 MG/ML
4 INJECTION INTRAMUSCULAR; INTRAVENOUS ONCE AS NEEDED
Status: DISCONTINUED | OUTPATIENT
Start: 2020-12-03 | End: 2020-12-03 | Stop reason: HOSPADM

## 2020-12-03 RX ORDER — MAGNESIUM CARB/ALUMINUM HYDROX 105-160MG
296 TABLET,CHEWABLE ORAL ONCE
Status: DISCONTINUED | OUTPATIENT
Start: 2020-12-03 | End: 2020-12-03 | Stop reason: HOSPADM

## 2020-12-03 RX ORDER — ONDANSETRON 2 MG/ML
INJECTION INTRAMUSCULAR; INTRAVENOUS AS NEEDED
Status: DISCONTINUED | OUTPATIENT
Start: 2020-12-03 | End: 2020-12-03 | Stop reason: SURG

## 2020-12-03 RX ORDER — SODIUM CHLORIDE 9 MG/ML
1000 INJECTION, SOLUTION INTRAVENOUS CONTINUOUS
Status: CANCELLED | OUTPATIENT
Start: 2020-12-03

## 2020-12-03 RX ORDER — PROPOFOL 10 MG/ML
VIAL (ML) INTRAVENOUS AS NEEDED
Status: DISCONTINUED | OUTPATIENT
Start: 2020-12-03 | End: 2020-12-03 | Stop reason: SURG

## 2020-12-03 RX ORDER — SODIUM CHLORIDE 0.9 % (FLUSH) 0.9 %
10 SYRINGE (ML) INJECTION AS NEEDED
Status: CANCELLED | OUTPATIENT
Start: 2020-12-03

## 2020-12-03 RX ORDER — LIDOCAINE HYDROCHLORIDE 10 MG/ML
0.5 INJECTION, SOLUTION INFILTRATION; PERINEURAL ONCE AS NEEDED
Status: CANCELLED | OUTPATIENT
Start: 2020-12-03

## 2020-12-03 RX ORDER — SODIUM CHLORIDE 0.9 % (FLUSH) 0.9 %
3 SYRINGE (ML) INJECTION EVERY 12 HOURS SCHEDULED
Status: CANCELLED | OUTPATIENT
Start: 2020-12-03

## 2020-12-03 RX ADMIN — PROPOFOL 200 MG: 10 INJECTION, EMULSION INTRAVENOUS at 10:14

## 2020-12-03 RX ADMIN — SODIUM CHLORIDE: 0.9 INJECTION, SOLUTION INTRAVENOUS at 10:11

## 2020-12-03 RX ADMIN — LIDOCAINE HYDROCHLORIDE 40 MG: 10 INJECTION, SOLUTION EPIDURAL; INFILTRATION; INTRACAUDAL; PERINEURAL at 10:12

## 2020-12-03 RX ADMIN — PROPOFOL 150 MG: 10 INJECTION, EMULSION INTRAVENOUS at 10:18

## 2020-12-03 RX ADMIN — ONDANSETRON 4 MG: 2 INJECTION INTRAMUSCULAR; INTRAVENOUS at 10:13

## 2020-12-03 RX ADMIN — PROPOFOL 50 MG: 10 INJECTION, EMULSION INTRAVENOUS at 10:23

## 2020-12-03 NOTE — H&P
GI CONSULT  NOTE:    Referring Provider:    Anna Kenney MD  [unfilled]    Chief complaint: <principal problem not specified>    Subjective .       Pre op diagnosis  Abdominal pain, generalized [R10.84]  Constipation [K59.00]  GERD (gastroesophageal reflux disease) [K21.9]      History of present illness:      Gregory Baez is a 20 y.o. female who presents today for Procedure(s):  ESOPHAGOGASTRODUODENOSCOPY  COLONOSCOPY for the indications listed below.     The updated Patient Profile was reviewed prior to the procedure, in conjunction with the Physical Exam, including medical conditions, surgical procedures, medications, allergies, family history and social history.     Pre-operatively, I reviewed the indication(s) for the procedure, the risks of the procedure [including but not limited to: unexpected bleeding possibly requiring hospitalization and/or unplanned repeat procedures, perforation possibly requiring surgical treatment, missed lesions and complications of sedation/MAC (also explained by anesthesia staff)].     I have evaluated the patient for risks associated with the planned anesthesia and the procedure to be performed and find the patient an acceptable candidate for IV sedation.    Multiple opportunities were provided for any questions or concerns, and all questions were answered satisfactorily before any anesthesia was administered. We will proceed with the planned procedure.    Past Medical History:  Past Medical History:   Diagnosis Date   • Abnormal bowel habits    • Anxiety    • Asthma     childhood.   • Depression    • Dizziness    • GERD (gastroesophageal reflux disease)        Past Surgical History:  Past Surgical History:   Procedure Laterality Date   • ADENOIDECTOMY     • DENTAL PROCEDURE     • MYRINGOTOMY W/ TUBES     • TONSILLECTOMY         Social History:  Social History     Tobacco Use   • Smoking status: Current Some Day Smoker     Types: Cigarettes   • Smokeless  "tobacco: Never Used   • Tobacco comment: weaning \"hasn't had cigarette in 3 days\"    Substance Use Topics   • Alcohol use: Yes     Alcohol/week: 8.0 standard drinks     Types: 4 Cans of beer, 4 Shots of liquor per week     Frequency: Monthly or less     Drinks per session: 7 to 9     Binge frequency: Less than monthly     Comment: socially   • Drug use: Yes     Frequency: 4.0 times per week     Types: Marijuana     Comment: occasionally       Family History:  Family History   Problem Relation Age of Onset   • Hypertension Father    • Diabetes Father    • Hyperlipidemia Father    • Hypertension Maternal Grandmother    • Hypertension Paternal Grandmother    • Diabetes Paternal Grandmother    • Hyperlipidemia Paternal Grandmother    • Heart disease Paternal Grandmother    • Hypertension Paternal Grandfather    • Diabetes Paternal Grandfather    • Hyperlipidemia Paternal Grandfather        Medications:  No medications prior to admission.       Scheduled Meds:  Continuous Infusions:No current facility-administered medications for this encounter.     PRN Meds:.    ALLERGIES:  Penicillins    ROS:  The following systems were reviewed and negative;  Constitution:  No fevers, chills, no unintentional weight loss  Skin: no rash, no jaundice  Eyes:  No blurry vision, no eye pain  HENT:  No change in hearing or smell  Resp:  No dyspnea or cough  CV:  No chest pain or palpitations  :  No dysuria, hematuria  Musculoskeletal:  No leg cramps or arthralgias  Neuro:  No tremor, no numbness  Psych:  No depression or confsuion    Objective     Vital Signs:   Vitals:    11/24/20 1110   Weight: 71.7 kg (158 lb)   Height: 162.6 cm (64\")       Physical Exam:       General Appearance:    Awake and alert, in no acute distress   Head:    Normocephalic, without obvious abnormality, atraumatic   Throat:   No oral lesions, no thrush, oral mucosa moist   Lungs:     respirations regular, even and unlabored   Skin:   No rash, no jaundice "       Results Review:  Lab Results (last 24 hours)     ** No results found for the last 24 hours. **          Imaging Results (Last 24 Hours)     ** No results found for the last 24 hours. **           I reviewed the patient's labs and imaging.    ASSESSMENT AND PLAN:      Active Problems:    * No active hospital problems. *       Procedure(s):  ESOPHAGOGASTRODUODENOSCOPY  COLONOSCOPY      I discussed the patients findings and my recommendations with the patient.    Bear Caraballo MD  12/03/20  07:55 EST

## 2020-12-03 NOTE — OP NOTE
ESOPHAGOGASTRODUODENOSCOPY, COLONOSCOPY Procedure Report    Patient Name:  Gregory Baez  YOB: 2000    Date of Surgery:  12/3/2020     Pre-Op Diagnosis:  Abdominal pain, generalized [R10.84]  Constipation [K59.00]  GERD (gastroesophageal reflux disease) [K21.9]   Diarrhea  Right upper quadrant abdominal pain       Postop diagnosis:  1.  Normal colonoscopy  2.  Erosive duodenitis  3.  Erosive gastritis    Procedure/CPT® Codes:      Procedure(s):  ESOPHAGOGASTRODUODENOSCOPY with biopsy  COLONOSCOPY with biopsy    Staff:  Surgeon(s):  Bear Caraballo MD      Anesthesia: Monitored Anesthesia Care    Description of Procedure:  A description of the procedure as well as risks, benefits and alternative methods were explained to the patient who voiced understanding and signed the corresponding consent form. A physical exam was performed and vital signs were monitored throughout the procedure.    An upper GI endoscope was placed into the mouth and proceeded through the esophagus, stomach and second portion of the duodenum without difficulty. The scope was then retroflexed and the fundus was visualized. The procedure was not difficult and there were no immediate complications.  There was no blood loss.    Next, A rectal exam was performed which was normal. An Olympus colonoscope was placed into the rectum and proceeded under direct visualization through the colon until the cecum and appendiceal orifice were identified. Careful visualization occurred upon slow withdraw of the scope. The scope was then retroflexed and the distal rectum was visualized. The quality of the prep was good. The procedure was not difficult and there were no immediate complications.  There was no blood loss.    Impression:  EGD findings:  1.  Normal esophageal mucosa entire esophagus  2.  Gastric erosions in the antrum consistent with erosive gastritis, cold forcep biopsies of the antrum and body were taken for histopathology and H.  pylori  3.  Erosions and erythema in the duodenal bulb suggestive of erosive duodenitis, cold forcep biopsies of the duodenal bulb and the second portion of the duodenum were taken to rule out causes of diarrhea.    Colonoscopy findings:  1.  Normal colonic mucosa entire colon, cold forcep biopsies were taken of the random colon sent for histopathology 2 microscopic colitis  2.  Normal terminal ileum mucosa    Recommendations:  PPI daily  Follow-up biopsy results and treat H. pylori if positive  Repeat colonoscopy at age 43 for screening purposes      Bear Caraballo MD     Date: 12/3/2020    Time: 10:21 EST

## 2020-12-03 NOTE — ANESTHESIA PREPROCEDURE EVALUATION
Anesthesia Evaluation                  Airway   Mallampati: II  TM distance: >3 FB  Neck ROM: full  No difficulty expected  Dental - normal exam     Pulmonary - normal exam   (+) asthma,  Cardiovascular - normal exam    (+) hyperlipidemia,       Neuro/Psych  (+) dizziness/light headedness, syncope, psychiatric history Anxiety and Depression,     GI/Hepatic/Renal/Endo    (+)  GERD,      Musculoskeletal     Abdominal  - normal exam    Bowel sounds: normal.   Substance History      OB/GYN          Other                        Anesthesia Plan    ASA 2     MAC     intravenous induction     Anesthetic plan, all risks, benefits, and alternatives have been provided, discussed and informed consent has been obtained with: patient.

## 2020-12-03 NOTE — ANESTHESIA POSTPROCEDURE EVALUATION
Patient: Gregory Baez    Procedure Summary     Date: 12/03/20 Room / Location: [facility] ENDOSCOPY 1 / [facility] ENDOSCOPY    Anesthesia Start: 1011 Anesthesia Stop: 1035    Procedures:       ESOPHAGOGASTRODUODENOSCOPY (N/A )      COLONOSCOPY (N/A ) Diagnosis:       Abdominal pain, generalized      Constipation      GERD (gastroesophageal reflux disease)      (Abdominal pain, generalized [R10.84])      (Constipation [K59.00])      (GERD (gastroesophageal reflux disease) [K21.9])    Surgeon: Bear Caraballo MD Provider: Pina Goldberg MD    Anesthesia Type: MAC ASA Status: 2          Anesthesia Type: MAC    Vitals  No vitals data found for the desired time range.          Post Anesthesia Care and Evaluation    Patient location during evaluation: PACU  Patient participation: complete - patient participated  Level of consciousness: awake  Pain score: 0 (See nurse's notes for pain score)  Pain management: adequate  Airway patency: patent  Anesthetic complications: No anesthetic complications  PONV Status: none  Cardiovascular status: acceptable  Respiratory status: acceptable  Hydration status: acceptable    Comments: Patient seen and examined postoperatively; vital signs stable; SpO2 greater than or equal to 90%; cardiopulmonary status stable; nausea/vomiting adequately controlled; pain adequately controlled; no apparent anesthesia complications; patient discharged from anesthesia care when discharge criteria were met

## 2020-12-03 NOTE — DISCHARGE INSTRUCTIONS
A responsible adult should stay with you and you should rest quietly for the rest of the day.    Do not drink alcohol, drive, operate any heavy machinery or power tools or make any legal/important decisions for the next 24 hours.     Progress your diet as tolerated.  If you begin to experience severe pain, increased shortness of breath, racing heartbeat or a fever above 101 F, seek immediate medical attention.     Follow up with MD as instructed. Call office for results in 3 to 5 days if needed. [phone]    Impression:  EGD findings:  1.  Normal esophageal mucosa entire esophagus  2.  Gastric erosions in the antrum consistent with erosive gastritis, cold forcep biopsies of the antrum and body were taken for histopathology and H. pylori  3.  Erosions and erythema in the duodenal bulb suggestive of erosive duodenitis, cold forcep biopsies of the duodenal bulb and the second portion of the duodenum were taken to rule out causes of diarrhea.     Colonoscopy findings:  1.  Normal colonic mucosa entire colon, cold forcep biopsies were taken of the random colon sent for histopathology 2 microscopic colitis  2.  Normal terminal ileum mucosa     Recommendations:  PPI daily  Follow-up biopsy results and treat H. pylori if positive  Repeat colonoscopy at age 43 for screening purposes

## 2020-12-04 LAB
LAB AP CASE REPORT: NORMAL
PATH REPORT.FINAL DX SPEC: NORMAL
PATH REPORT.GROSS SPEC: NORMAL

## 2020-12-28 ENCOUNTER — OFFICE (OUTPATIENT)
Dept: URBAN - METROPOLITAN AREA CLINIC 64 | Facility: CLINIC | Age: 20
End: 2020-12-28
Payer: COMMERCIAL

## 2020-12-28 VITALS
SYSTOLIC BLOOD PRESSURE: 124 MMHG | HEART RATE: 84 BPM | DIASTOLIC BLOOD PRESSURE: 87 MMHG | WEIGHT: 163 LBS | HEIGHT: 63 IN

## 2020-12-28 DIAGNOSIS — K59.00 CONSTIPATION, UNSPECIFIED: ICD-10-CM

## 2020-12-28 DIAGNOSIS — K21.9 GASTRO-ESOPHAGEAL REFLUX DISEASE WITHOUT ESOPHAGITIS: ICD-10-CM

## 2020-12-28 DIAGNOSIS — R10.11 RIGHT UPPER QUADRANT PAIN: ICD-10-CM

## 2020-12-28 PROCEDURE — 99213 OFFICE O/P EST LOW 20 MIN: CPT | Performed by: NURSE PRACTITIONER

## 2021-01-12 LAB
EXTERNAL ABO GROUPING: NORMAL
EXTERNAL HEPATITIS B SURFACE ANTIGEN: NEGATIVE
EXTERNAL RH FACTOR: NEGATIVE
EXTERNAL RUBELLA QUALITATIVE: NORMAL
EXTERNAL SYPHILIS RPR SCREEN: NORMAL
HIV1 P24 AG SERPL QL IA: NEGATIVE

## 2021-01-28 RX ORDER — FAMOTIDINE 40 MG/1
40 TABLET, FILM COATED ORAL DAILY
Qty: 90 TABLET | Refills: 3 | Status: SHIPPED | OUTPATIENT
Start: 2021-01-28 | End: 2021-06-25 | Stop reason: SDUPTHER

## 2021-02-19 PROBLEM — R87.612 LOW GRADE SQUAMOUS INTRAEPITHELIAL LESION ON CYTOLOGIC SMEAR OF CERVIX (LGSIL): Status: ACTIVE | Noted: 2021-02-19

## 2021-03-23 ENCOUNTER — OFFICE VISIT (OUTPATIENT)
Dept: FAMILY MEDICINE CLINIC | Facility: CLINIC | Age: 21
End: 2021-03-23

## 2021-03-23 VITALS
WEIGHT: 163.6 LBS | SYSTOLIC BLOOD PRESSURE: 116 MMHG | TEMPERATURE: 98.2 F | OXYGEN SATURATION: 98 % | HEIGHT: 64 IN | BODY MASS INDEX: 27.93 KG/M2 | HEART RATE: 104 BPM | DIASTOLIC BLOOD PRESSURE: 78 MMHG

## 2021-03-23 DIAGNOSIS — R22.9 MULTIPLE SKIN NODULES: Primary | ICD-10-CM

## 2021-03-23 DIAGNOSIS — T14.8XXA BRUISING: ICD-10-CM

## 2021-03-23 PROCEDURE — 99214 OFFICE O/P EST MOD 30 MIN: CPT | Performed by: PREVENTIVE MEDICINE

## 2021-03-23 RX ORDER — CLOTRIMAZOLE AND BETAMETHASONE DIPROPIONATE 10; .64 MG/G; MG/G
CREAM TOPICAL
Status: ON HOLD | COMMUNITY
Start: 2021-01-19 | End: 2021-08-04

## 2021-03-23 NOTE — PROGRESS NOTES
"Subjective   Gregory Baez is a 20 y.o. female presents for   Chief Complaint   Patient presents with   • Bleeding/Bruising     bilateral legs bruising       Health Maintenance Due   Topic Date Due   • Pneumococcal Vaccine 0-64 (1 of 1 - PPSV23) 2004   • INFLUENZA VACCINE  Never done   • TDAP/TD VACCINES (2 - Td) 2020       20-year-old  1 para 0 that is approximately 4-1/2 months pregnant states that she started to develop tender nodules on the anterior surface of both of her lower legs intermittently that were about a centimeter in size and did bruise very easily she has noticed some easier bruisability on other areas her bodies but no other nodules pregnancy has been uneventful genetic testing was negative she has felt the baby flutter.  Was sent here by her gynecologist to see what is going on with her legs.       Vitals:    21 0819 21 0821   BP: 118/79 116/78   BP Location: Left arm Right arm   Patient Position: Sitting Sitting   Cuff Size: Adult Adult   Pulse: 104    Temp: 98.2 °F (36.8 °C)    TempSrc: Temporal    SpO2: 98%    Weight: 74.2 kg (163 lb 9.6 oz)    Height: 162.6 cm (64.02\")      Body mass index is 28.07 kg/m².    Current Outpatient Medications on File Prior to Visit   Medication Sig Dispense Refill   • calcium carbonate (TUMS) 500 MG chewable tablet Chew 1 tablet 2 (Two) Times a Day As Needed for Indigestion or Heartburn. Do not take dos.     • cetirizine (zyrTEC) 10 MG tablet Take 10 mg by mouth Every Evening.     • doxylamine (UNISOM) 25 MG tablet Take 25 mg by mouth At Night As Needed for Sleep.     • famotidine (PEPCID) 40 MG tablet Take 1 tablet by mouth Daily. Take dos. 90 tablet 3   • Prenatal Vit-DSS-Fe Cbn-FA (PRENATAL OPTIMA ADVANCE PO) Take  by mouth.     • aspirin-acetaminophen-caffeine (EXCEDRIN MIGRAINE) 250-250-65 MG per tablet Take 1 tablet by mouth Every 8 (Eight) Hours As Needed for Headache. Do not take dos.     • clotrimazole-betamethasone " (LOTRISONE) 1-0.05 % cream APPLY TO AFFECTED AREA TWICE A DAY AS NEEDED       No current facility-administered medications on file prior to visit.       The following portions of the patient's history were reviewed and updated as appropriate: allergies, current medications, past family history, past medical history, past social history, past surgical history and problem list.    Review of Systems   Skin: Positive for skin lesions and bruise.       Objective   Physical Exam  Vitals reviewed.   Constitutional:       General: She is not in acute distress.     Appearance: Normal appearance. She is well-developed. She is not ill-appearing or toxic-appearing.   HENT:      Head: Normocephalic and atraumatic.      Right Ear: Tympanic membrane, ear canal and external ear normal.      Left Ear: Tympanic membrane, ear canal and external ear normal.      Nose: Nose normal.   Eyes:      Extraocular Movements: Extraocular movements intact.      Conjunctiva/sclera: Conjunctivae normal.      Pupils: Pupils are equal, round, and reactive to light.   Cardiovascular:      Rate and Rhythm: Regular rhythm. Tachycardia present.      Pulses: Normal pulses.      Heart sounds: Normal heart sounds. No murmur heard.     Pulmonary:      Effort: Pulmonary effort is normal.      Breath sounds: Normal breath sounds.   Abdominal:      General: Bowel sounds are normal. There is no distension.      Palpations: Abdomen is soft. There is no mass.      Tenderness: There is no abdominal tenderness.      Comments: No FHTs but small flutter felt in the right lower uterine area spleen is nontender and there is no other adenopathy that I was able to palpate.   Musculoskeletal:         General: Normal range of motion.      Cervical back: Neck supple.   Skin:     General: Skin is warm.   Neurological:      General: No focal deficit present.      Mental Status: She is alert and oriented to person, place, and time.   Psychiatric:         Mood and Affect: Mood  normal.         Behavior: Behavior normal.       PHQ-9 Total Score:      Assessment/Plan   Diagnoses and all orders for this visit:    1. Multiple skin nodules (Primary)  Comments:  Lower leg anterior leg nodules that began to develop as soon as she became pregnant.  These areas  are approximately 1 cm and do bruise easily.    Orders:  -     Sedimentation Rate; Future  -     C-reactive Protein; Future  -     Ambulatory Referral to Dermatology  -     TSH    2. Bruising  Comments:  Anterior tibial nodules bruise easily but she is also noticed some easy bruisability and other various parts of her body.  No vaginal bleeding  Orders:  -     CBC Auto Differential  -     Ambulatory Referral to Hematology / Oncology  -     Protime-INR; Future  -     APTT; Future  -     Comprehensive Metabolic Panel        Patient Instructions     Health Maintenance Due   Topic Date Due   • Pneumococcal Vaccine 0-64 (1 of 1 - PPSV23) 09/12/2004   • INFLUENZA VACCINE  Never done   • TDAP/TD VACCINES (2 - Td) 09/14/2020     If notes increased bumps on skin, Bleeding call OB first this office second.    Call this office after sees specialist

## 2021-03-23 NOTE — PATIENT INSTRUCTIONS
Health Maintenance Due   Topic Date Due   • Pneumococcal Vaccine 0-64 (1 of 1 - PPSV23) 09/12/2004   • INFLUENZA VACCINE  Never done   • TDAP/TD VACCINES (2 - Td) 09/14/2020     If notes increased bumps on skin, Bleeding call OB first this office second.    Call this office after sees specialist

## 2021-03-31 ENCOUNTER — LAB (OUTPATIENT)
Dept: LAB | Facility: HOSPITAL | Age: 21
End: 2021-03-31

## 2021-03-31 DIAGNOSIS — R22.9 MULTIPLE SKIN NODULES: Primary | ICD-10-CM

## 2021-03-31 DIAGNOSIS — R22.9 MULTIPLE SKIN NODULES: ICD-10-CM

## 2021-03-31 DIAGNOSIS — T14.8XXA BRUISING: ICD-10-CM

## 2021-03-31 LAB
ALBUMIN SERPL-MCNC: 4.1 G/DL (ref 3.5–5.2)
ALBUMIN/GLOB SERPL: 1.6 G/DL
ALP SERPL-CCNC: 46 U/L (ref 39–117)
ALT SERPL W P-5'-P-CCNC: 10 U/L (ref 1–33)
ANION GAP SERPL CALCULATED.3IONS-SCNC: 10.3 MMOL/L (ref 5–15)
APTT PPP: 24.3 SECONDS (ref 24–31)
AST SERPL-CCNC: 20 U/L (ref 1–32)
BASOPHILS # BLD AUTO: 0.07 10*3/MM3 (ref 0–0.2)
BASOPHILS NFR BLD AUTO: 0.7 % (ref 0–1.5)
BILIRUB SERPL-MCNC: <0.2 MG/DL (ref 0–1.2)
BUN SERPL-MCNC: 11 MG/DL (ref 6–20)
BUN/CREAT SERPL: 22 (ref 7–25)
CALCIUM SPEC-SCNC: 8.7 MG/DL (ref 8.6–10.5)
CHLORIDE SERPL-SCNC: 103 MMOL/L (ref 98–107)
CO2 SERPL-SCNC: 24.7 MMOL/L (ref 22–29)
CREAT SERPL-MCNC: 0.5 MG/DL (ref 0.57–1)
CRP SERPL-MCNC: 0.73 MG/DL (ref 0–0.5)
DEPRECATED RDW RBC AUTO: 40.2 FL (ref 37–54)
EOSINOPHIL # BLD AUTO: 0.08 10*3/MM3 (ref 0–0.4)
EOSINOPHIL NFR BLD AUTO: 0.8 % (ref 0.3–6.2)
ERYTHROCYTE [DISTWIDTH] IN BLOOD BY AUTOMATED COUNT: 12.3 % (ref 12.3–15.4)
ERYTHROCYTE [SEDIMENTATION RATE] IN BLOOD: 11 MM/HR (ref 0–20)
GFR SERPL CREATININE-BSD FRML MDRD: >150 ML/MIN/1.73
GLOBULIN UR ELPH-MCNC: 2.6 GM/DL
GLUCOSE SERPL-MCNC: 80 MG/DL (ref 65–99)
HCT VFR BLD AUTO: 38.5 % (ref 34–46.6)
HGB BLD-MCNC: 13 G/DL (ref 12–15.9)
IMM GRANULOCYTES # BLD AUTO: 0.04 10*3/MM3 (ref 0–0.05)
IMM GRANULOCYTES NFR BLD AUTO: 0.4 % (ref 0–0.5)
INR PPP: 0.95 (ref 0.93–1.1)
LYMPHOCYTES # BLD AUTO: 2.66 10*3/MM3 (ref 0.7–3.1)
LYMPHOCYTES NFR BLD AUTO: 25.8 % (ref 19.6–45.3)
MCH RBC QN AUTO: 30.5 PG (ref 26.6–33)
MCHC RBC AUTO-ENTMCNC: 33.8 G/DL (ref 31.5–35.7)
MCV RBC AUTO: 90.4 FL (ref 79–97)
MONOCYTES # BLD AUTO: 0.7 10*3/MM3 (ref 0.1–0.9)
MONOCYTES NFR BLD AUTO: 6.8 % (ref 5–12)
NEUTROPHILS NFR BLD AUTO: 6.76 10*3/MM3 (ref 1.7–7)
NEUTROPHILS NFR BLD AUTO: 65.5 % (ref 42.7–76)
NRBC BLD AUTO-RTO: 0 /100 WBC (ref 0–0.2)
PLATELET # BLD AUTO: 265 10*3/MM3 (ref 140–450)
PMV BLD AUTO: 12.6 FL (ref 6–12)
POTASSIUM SERPL-SCNC: 4.5 MMOL/L (ref 3.5–5.2)
PROT SERPL-MCNC: 6.7 G/DL (ref 6–8.5)
PROTHROMBIN TIME: 10.5 SECONDS (ref 9.6–11.7)
RBC # BLD AUTO: 4.26 10*6/MM3 (ref 3.77–5.28)
SODIUM SERPL-SCNC: 138 MMOL/L (ref 136–145)
TSH SERPL DL<=0.05 MIU/L-ACNC: 2.84 UIU/ML (ref 0.27–4.2)
WBC # BLD AUTO: 10.31 10*3/MM3 (ref 3.4–10.8)

## 2021-03-31 PROCEDURE — 86140 C-REACTIVE PROTEIN: CPT

## 2021-03-31 PROCEDURE — 84443 ASSAY THYROID STIM HORMONE: CPT | Performed by: PREVENTIVE MEDICINE

## 2021-03-31 PROCEDURE — 85652 RBC SED RATE AUTOMATED: CPT

## 2021-03-31 PROCEDURE — 85730 THROMBOPLASTIN TIME PARTIAL: CPT

## 2021-03-31 PROCEDURE — 36415 COLL VENOUS BLD VENIPUNCTURE: CPT

## 2021-03-31 PROCEDURE — 85610 PROTHROMBIN TIME: CPT

## 2021-03-31 PROCEDURE — 80053 COMPREHEN METABOLIC PANEL: CPT | Performed by: PREVENTIVE MEDICINE

## 2021-03-31 PROCEDURE — 85025 COMPLETE CBC W/AUTO DIFF WBC: CPT | Performed by: PREVENTIVE MEDICINE

## 2021-04-01 ENCOUNTER — TELEPHONE (OUTPATIENT)
Dept: FAMILY MEDICINE CLINIC | Facility: CLINIC | Age: 21
End: 2021-04-01

## 2021-04-01 NOTE — TELEPHONE ENCOUNTER
Patient has to go to [facility] due to [facility] insurance. She will go to the one in [place].

## 2021-04-01 NOTE — TELEPHONE ENCOUNTER
HUB TO READ    Nothing in any of  the lab work to explain leg nodules-let's have her see dermatology to assess problem-referral placed.  Prefer she goes to [place]

## 2021-04-02 ENCOUNTER — TELEPHONE (OUTPATIENT)
Dept: ONCOLOGY | Facility: CLINIC | Age: 21
End: 2021-04-02

## 2021-04-06 ENCOUNTER — TELEPHONE (OUTPATIENT)
Dept: ONCOLOGY | Facility: CLINIC | Age: 21
End: 2021-04-06

## 2021-05-05 ENCOUNTER — LAB (OUTPATIENT)
Dept: LAB | Facility: HOSPITAL | Age: 21
End: 2021-05-05

## 2021-05-05 ENCOUNTER — CONSULT (OUTPATIENT)
Dept: ONCOLOGY | Facility: CLINIC | Age: 21
End: 2021-05-05

## 2021-05-05 VITALS
BODY MASS INDEX: 30.83 KG/M2 | HEIGHT: 64 IN | HEART RATE: 112 BPM | TEMPERATURE: 98.2 F | RESPIRATION RATE: 18 BRPM | WEIGHT: 180.6 LBS | DIASTOLIC BLOOD PRESSURE: 80 MMHG | SYSTOLIC BLOOD PRESSURE: 121 MMHG

## 2021-05-05 DIAGNOSIS — T14.8XXA BRUISING: ICD-10-CM

## 2021-05-05 DIAGNOSIS — T14.8XXA BRUISING: Primary | ICD-10-CM

## 2021-05-05 LAB
APTT PPP: 25.1 SECONDS (ref 24–31)
BASOPHILS # BLD AUTO: 0.04 10*3/MM3 (ref 0–0.2)
BASOPHILS NFR BLD AUTO: 0.4 % (ref 0–1.5)
CLOSURE TME COLL+ADP BLD: NORMAL S
CLOSURE TME COLL+EPINEP BLD: 113 SECONDS (ref 64–160)
DEPRECATED RDW RBC AUTO: 42.3 FL (ref 37–54)
EOSINOPHIL # BLD AUTO: 0.09 10*3/MM3 (ref 0–0.4)
EOSINOPHIL NFR BLD AUTO: 0.8 % (ref 0.3–6.2)
ERYTHROCYTE [DISTWIDTH] IN BLOOD BY AUTOMATED COUNT: 13 % (ref 12.3–15.4)
FIBRINOGEN PPP-MCNC: 525 MG/DL (ref 210–450)
HCT VFR BLD AUTO: 36.1 % (ref 34–46.6)
HGB BLD-MCNC: 11.8 G/DL (ref 12–15.9)
INR PPP: 0.95 (ref 0.93–1.1)
LYMPHOCYTES # BLD AUTO: 1.97 10*3/MM3 (ref 0.7–3.1)
LYMPHOCYTES NFR BLD AUTO: 17.9 % (ref 19.6–45.3)
MCH RBC QN AUTO: 30 PG (ref 26.6–33)
MCHC RBC AUTO-ENTMCNC: 32.7 G/DL (ref 31.5–35.7)
MCV RBC AUTO: 91.9 FL (ref 79–97)
MONOCYTES # BLD AUTO: 0.67 10*3/MM3 (ref 0.1–0.9)
MONOCYTES NFR BLD AUTO: 6.1 % (ref 5–12)
NEUTROPHILS NFR BLD AUTO: 74.8 % (ref 42.7–76)
NEUTROPHILS NFR BLD AUTO: 8.23 10*3/MM3 (ref 1.7–7)
PLATELET # BLD AUTO: 264 10*3/MM3 (ref 140–450)
PMV BLD AUTO: 12 FL (ref 6–12)
PROTHROMBIN TIME: 10.5 SECONDS (ref 9.6–11.7)
RBC # BLD AUTO: 3.93 10*6/MM3 (ref 3.77–5.28)
WBC # BLD AUTO: 11 10*3/MM3 (ref 3.4–10.8)

## 2021-05-05 PROCEDURE — 85610 PROTHROMBIN TIME: CPT

## 2021-05-05 PROCEDURE — 99204 OFFICE O/P NEW MOD 45 MIN: CPT | Performed by: INTERNAL MEDICINE

## 2021-05-05 PROCEDURE — 85730 THROMBOPLASTIN TIME PARTIAL: CPT

## 2021-05-05 PROCEDURE — 85025 COMPLETE CBC W/AUTO DIFF WBC: CPT

## 2021-05-05 PROCEDURE — 85384 FIBRINOGEN ACTIVITY: CPT

## 2021-05-05 PROCEDURE — 85576 BLOOD PLATELET AGGREGATION: CPT

## 2021-05-05 PROCEDURE — 36415 COLL VENOUS BLD VENIPUNCTURE: CPT

## 2021-05-05 NOTE — PROGRESS NOTES
HEMATOLOGY ONCOLOGY OUTPATIENT CONSULTATION       Patient name: Gregory Baez  : 2000  MRN: [id number]  Primary Care Physician: Anna Kenney MD  Referring Physician: Anna Kenney MD  Reason For Consult:     Chief Complaint   Patient presents with   • Appointment     Bruising   • Follow-up         HPI:   History of Present Illness:  Gregory Baez is 20 y.o. female who presented to our office on 21 for consultation regarding excessive bruising.  Patient is referred for evaluation of bruises on her lower extremities.  She denies any personal or family history of any hemostasis/coagulation problems.    · 2020: Hepatitis C antibody nonreactive,  · 3/31/2021: ESR 11, CRP 0.73 high, INR 0.95, PTT 24.3.,  TSH 2.84, creatinine 0.5, LFTs normal  · 2021: WBC 11, hemoglobin 11.8, MCV 91.9, platelets 264    Subjective:  1. 21.  Patient presents for initial consultation .  She is in her 24 weeks of pregnancy.  Currently does not have any active bruising.  The bruises/nodules on her lower legs have resolved.  She denies any personal or family history of hemostasis.  She does not report any personal or family history of hemostasis or coagulation problems.  She denied any nosebleeds, gum bleeds or any excess bleeding with her surgeries.   she is not on any anticoagulants.  She did report a history of menorrhagia.       The following portions of the patient's history were reviewed and updated as appropriate: past surgical history and problem list.    Past Medical History:   Diagnosis Date   • Abnormal bowel habits    • Anxiety    • Asthma     childhood.   • Depression    • Dizziness    • GERD (gastroesophageal reflux disease)    Vasovagal syncope    Past Surgical History:   Procedure Laterality Date   • ADENOIDECTOMY     • COLONOSCOPY N/A 12/3/2020    Procedure: COLONOSCOPY with random colon biopsy;  Surgeon: Bear Caraballo MD;  Location: [facility] ENDOSCOPY;   Service: Gastroenterology;  Laterality: N/A;  Post op: normal colon, random comon biopsy   • DENTAL PROCEDURE     • ENDOSCOPY N/A 12/3/2020    Procedure: ESOPHAGOGASTRODUODENOSCOPY with biopsy x2;  Surgeon: Bear Caraballo MD;  Location: [facility] ENDOSCOPY;  Service: Gastroenterology;  Laterality: N/A;  post op: duodenitis, gastritis, biopsy x2   • MYRINGOTOMY W/ TUBES     • TONSILLECTOMY           Current Outpatient Medications:   •  aspirin-acetaminophen-caffeine (EXCEDRIN MIGRAINE) 250-250-65 MG per tablet, Take 1 tablet by mouth Every 8 (Eight) Hours As Needed for Headache. Do not take dos., Disp: , Rfl:   •  cetirizine (zyrTEC) 10 MG tablet, Take 10 mg by mouth Every Evening., Disp: , Rfl:   •  clotrimazole-betamethasone (LOTRISONE) 1-0.05 % cream, APPLY TO AFFECTED AREA TWICE A DAY AS NEEDED, Disp: , Rfl:   •  famotidine (PEPCID) 40 MG tablet, Take 1 tablet by mouth Daily. Take dos., Disp: 90 tablet, Rfl: 3  •  Prenatal Vit-DSS-Fe Cbn-FA (PRENATAL OPTIMA ADVANCE PO), Take  by mouth., Disp: , Rfl:   •  calcium carbonate (TUMS) 500 MG chewable tablet, Chew 1 tablet 2 (Two) Times a Day As Needed for Indigestion or Heartburn. Do not take dos., Disp: , Rfl:   •  doxylamine (UNISOM) 25 MG tablet, Take 25 mg by mouth At Night As Needed for Sleep., Disp: , Rfl:     Allergies   Allergen Reactions   • Penicillins Nausea And Vomiting       Family History   Problem Relation Age of Onset   • Hypertension Father    • Diabetes Father    • Hyperlipidemia Father    • Hypertension Maternal Grandmother    • Hypertension Paternal Grandmother    • Diabetes Paternal Grandmother    • Hyperlipidemia Paternal Grandmother    • Heart disease Paternal Grandmother    • Hypertension Paternal Grandfather    • Diabetes Paternal Grandfather    • Hyperlipidemia Paternal Grandfather        Cancer-related family history is not on file.    Social History     Tobacco Use   • Smoking status: Current Some Day Smoker     Types: Cigarettes   •  "Smokeless tobacco: Never Used   • Tobacco comment: weaning \"hasn't had cigarette in 3 days\"    Substance Use Topics   • Alcohol use: Yes     Alcohol/week: 8.0 standard drinks     Types: 4 Cans of beer, 4 Shots of liquor per week     Comment: socially   • Drug use: Yes     Frequency: 4.0 times per week     Types: Marijuana     Comment: occasionally     Social History     Social History Narrative   • Not on file        ROS:     Review of Systems   Constitutional: Negative for chills and fever.   HENT: Negative for ear pain, mouth sores, nosebleeds and sore throat.    Eyes: Negative for photophobia and visual disturbance.   Respiratory: Negative for wheezing and stridor.    Cardiovascular: Negative for chest pain and palpitations.   Gastrointestinal: Negative for abdominal pain, diarrhea, nausea and vomiting.   Endocrine: Negative for cold intolerance and heat intolerance.   Genitourinary: Negative for dysuria and hematuria.   Musculoskeletal: Negative for joint swelling and neck stiffness.   Skin: Negative for color change and rash.   Neurological: Negative for seizures and syncope.   Hematological: Negative for adenopathy.        No obvious bleeding   Psychiatric/Behavioral: Negative for agitation, confusion and hallucinations.         Objective:    Vitals:    05/05/21 0838   BP: 121/80   Pulse: 112   Resp: 18   Temp: 98.2 °F (36.8 °C)   Weight: 81.9 kg (180 lb 9.6 oz)   Height: 162.6 cm (64.02\")   PainSc: 0-No pain     Body mass index is 30.98 kg/m².  ECOG  (0) Fully active, able to carry on all predisease performance without restriction    Physical Exam:     Physical Exam  Vitals and nursing note reviewed.   Constitutional:       General: She is not in acute distress.     Appearance: She is not diaphoretic.   HENT:      Head: Normocephalic and atraumatic.   Eyes:      General: No scleral icterus.        Right eye: No discharge.         Left eye: No discharge.      Conjunctiva/sclera: Conjunctivae normal.   Neck:    "   Thyroid: No thyromegaly.   Cardiovascular:      Rate and Rhythm: Normal rate and regular rhythm.      Heart sounds: Normal heart sounds. No friction rub. No gallop.    Pulmonary:      Effort: Pulmonary effort is normal. No respiratory distress.      Breath sounds: No stridor. No wheezing.   Abdominal:      General: Bowel sounds are normal.      Palpations: Abdomen is soft. There is no mass.      Tenderness: There is no abdominal tenderness. There is no guarding or rebound.   Musculoskeletal:         General: No tenderness. Normal range of motion.      Cervical back: Normal range of motion and neck supple.   Lymphadenopathy:      Cervical: No cervical adenopathy.   Skin:     General: Skin is warm.      Findings: No erythema or rash.   Neurological:      Mental Status: She is alert and oriented to person, place, and time.      Motor: No abnormal muscle tone.   Psychiatric:         Behavior: Behavior normal.     I have reexamined the patient and the results are consistent with the previously documented exam. Darian Bob MD         Lab Results - Last 18 Months   Lab Units 03/31/21  0830 07/13/20  1033 06/23/20  1417   WBC 10*3/mm3 10.31 9.78 10.50   HEMOGLOBIN g/dL 13.0 15.1 15.4   HEMATOCRIT % 38.5 43.4 44.7   PLATELETS 10*3/mm3 265 282 305   MCV fL 90.4 89.1 88.1     Lab Results - Last 18 Months   Lab Units 03/31/21  0830 07/13/20  1033 06/23/20  1417   SODIUM mmol/L 138 144 140   POTASSIUM mmol/L 4.5 4.3 3.7   CHLORIDE mmol/L 103 103 101   CO2 mmol/L 24.7 26.9 23.0   BUN mg/dL 11 9 11   CREATININE mg/dL 0.50* 0.72 0.59   CALCIUM mg/dL 8.7 9.8 9.9   BILIRUBIN mg/dL <0.2 0.3  --    ALK PHOS U/L 46 61  --    ALT (SGPT) U/L 10 34*  --    AST (SGOT) U/L 20 22  --    GLUCOSE mg/dL 80 96 84       Lab Results   Component Value Date    GLUCOSE 80 03/31/2021    BUN 11 03/31/2021    CREATININE 0.50 (L) 03/31/2021    EGFRIFNONA >150 03/31/2021    BCR 22.0 03/31/2021    K 4.5 03/31/2021    CO2 24.7 03/31/2021    CALCIUM 8.7  03/31/2021    ALBUMIN 4.10 03/31/2021    AST 20 03/31/2021    ALT 10 03/31/2021       Lab Results - Last 18 Months   Lab Units 03/31/21  0830   INR  0.95   APTT seconds 24.3       No results found for: IRON, TIBC, FERRITIN    No results found for: FOLATE    No results found for: OCCULTBLD    No results found for: RETICCTPCT    No results found for: [id number]  No results found for: SPEP, UPEP  No results found for: LDH, URICACID  Lab Results   Component Value Date    SEDRATE 11 03/31/2021     No results found for: FIBRINOGEN, HAPTOGLOBIN  Lab Results   Component Value Date    PTT 24.3 03/31/2021    INR 0.95 03/31/2021     No results found for:   No results found for: CEA  No components found for: CA-19-9  No results found for: PSA  No results found for: AFPTM, VD8384, HCGTM, SPEP, RAJESH         Assessment/Plan     Assessment:    1. Excess bruising: Patient reported the symptoms very briefly a few weeks ago.  She does not report any personal or family history of hemostasis or coagulation problems.  She denied any nosebleeds, gum bleeds or any excess bleeding with her surgeries.   she is not on any anticoagulants.  She did report a history of menorrhagia.  2. Pregnancy: Currently in second trimester.  Uneventful  3. Mild leukocytosis: Could be physiologic.        Plan:  1. I explained to the patient that it is very unlikely we would find any coagulation or hemostasis problem based on her clinical history and family history.  She has very normal-appearing hematological history at this point.  2. We will do a basic screening for hemostasis problem.  Check PT, PTT, fibrinogen, PFA-100, von Willebrand panel.   3. Follow-up in 1 month                   Thank you very much for providing the opportunity to participate in this patient’s care. Please do not hesitate to call if there are any other questions    I have reviewed and confirmed the accuracy of the patient's history: Chief complaint, HPI, ROS, Subjective and Past  Family Social History as entered by the MA/LPN/RN. Darian Bob MD 05/05/21       Electronically signed by Darian Bob MD, 05/05/21, 8:50 AM EDT.

## 2021-05-07 LAB
FACT VIII ACT/NOR PPP: 180 % (ref 56–140)
PATH INTERP BLD-IMP: NORMAL
VWF AG ACT/NOR PPP IA: 147 % (ref 50–200)
VWF:RCO ACT/NOR PPP PL AGG: 118 % (ref 50–200)

## 2021-06-07 ENCOUNTER — APPOINTMENT (OUTPATIENT)
Dept: LAB | Facility: HOSPITAL | Age: 21
End: 2021-06-07

## 2021-06-24 ENCOUNTER — TELEPHONE (OUTPATIENT)
Dept: FAMILY MEDICINE CLINIC | Facility: CLINIC | Age: 21
End: 2021-06-24

## 2021-06-24 RX ORDER — FAMOTIDINE 40 MG/1
40 TABLET, FILM COATED ORAL DAILY
Qty: 90 TABLET | Refills: 3 | OUTPATIENT
Start: 2021-06-24

## 2021-06-24 NOTE — TELEPHONE ENCOUNTER
Caller: Gregory Baez    Relationship: Self    Best call back number: [phone]     Medication needed:   Requested Prescriptions     Pending Prescriptions Disp Refills   • famotidine (PEPCID) 40 MG tablet 90 tablet 3     Sig: Take 1 tablet by mouth Daily. Take dos.       When do you need the refill by: SOON    Does the patient have less than a 3 day supply:  [] Yes  [x] No    What is the patient's preferred pharmacy: [facility]/PHARMACY #[id number] - [address] [phone] [place] [phone]

## 2021-06-24 NOTE — TELEPHONE ENCOUNTER
Caller: Gregory Baez    Relationship: Self    Best call back number: [phone]    What is the best time to reach you: ANYTIME     Who are you requesting to speak with (clinical staff, provider,  specific staff member): CLINICAL     Do you know the name of the person who called: MARGUERITE    What was the call regarding: PATIENT CALLED IN AND SAID HER OB IS FINE WITH HER TAKING THE ZPAK. SHE ALSO WANTED TO KNOW IF SHE COULD TAKE MORE PEPCID.    Do you require a callback: YES        ”

## 2021-06-24 NOTE — TELEPHONE ENCOUNTER
I called to offer the pt an appt, and she said that she doesn't want to come in for an appt just to be seen, examined and possibly given a COVID test and not be able to be treated for her sinus infection. She said that with her being 36 weeks, she didn't know if anything could be prescribed anyway. Please advise?

## 2021-06-24 NOTE — TELEPHONE ENCOUNTER
PATIENT STATES THAT SHE IS 32 WEEKS PREGNANT AND IS EXPERIENCING SINUS CONGESTION, HEADACHE, AND GREEN MUCUS    SHE STATES THAT SHE HAS BEEN USING A NETIPOT, KEEPS HER HANDS WASHED AND HAS EVEN TRIED MUCINEX AND SHE STATES THAT THOSE ARE NO LONGER WORKING.    SHE WOULD LIKE TO BE ADVISED    PATIENT CAN BE REACHED [phone]

## 2021-06-25 DIAGNOSIS — J32.9 SINUSITIS, UNSPECIFIED CHRONICITY, UNSPECIFIED LOCATION: Primary | ICD-10-CM

## 2021-06-25 RX ORDER — AZITHROMYCIN 250 MG/1
TABLET, FILM COATED ORAL
Qty: 6 TABLET | Refills: 0 | Status: SHIPPED | OUTPATIENT
Start: 2021-06-25 | End: 2021-08-07 | Stop reason: HOSPADM

## 2021-06-25 RX ORDER — FAMOTIDINE 40 MG/1
40 TABLET, FILM COATED ORAL DAILY
Qty: 90 TABLET | Refills: 3 | Status: SHIPPED | OUTPATIENT
Start: 2021-06-25 | End: 2021-08-07 | Stop reason: HOSPADM

## 2021-06-25 NOTE — TELEPHONE ENCOUNTER
I called and spoke with Pt. She said that she had already spoke with OB. They are okay with her taking the Pepcid, and okay with TUMS at bedtime for her stomach. She has already picked up the ATB, and got it started. Thinks it is helping. She is awaiting the pharmacy to call her to get the Pecid picked up.

## 2021-07-28 LAB — EXTERNAL GROUP B STREP ANTIGEN: NORMAL

## 2021-08-03 ENCOUNTER — HOSPITAL ENCOUNTER (OUTPATIENT)
Facility: HOSPITAL | Age: 21
Discharge: HOME | End: 2021-08-03
Attending: OBSTETRICS & GYNECOLOGY | Admitting: OBSTETRICS & GYNECOLOGY

## 2021-08-03 VITALS
HEIGHT: 64 IN | HEART RATE: 94 BPM | DIASTOLIC BLOOD PRESSURE: 88 MMHG | RESPIRATION RATE: 18 BRPM | TEMPERATURE: 98 F | WEIGHT: 205.69 LBS | SYSTOLIC BLOOD PRESSURE: 131 MMHG | BODY MASS INDEX: 35.12 KG/M2 | OXYGEN SATURATION: 99 %

## 2021-08-03 LAB
ALBUMIN SERPL-MCNC: 3.5 G/DL (ref 3.5–5.2)
ALBUMIN/GLOB SERPL: 1.3 G/DL
ALP SERPL-CCNC: 168 U/L (ref 39–117)
ALT SERPL W P-5'-P-CCNC: 8 U/L (ref 1–33)
ANION GAP SERPL CALCULATED.3IONS-SCNC: 12 MMOL/L (ref 5–15)
AST SERPL-CCNC: 16 U/L (ref 1–32)
BILIRUB SERPL-MCNC: <0.2 MG/DL (ref 0–1.2)
BUN SERPL-MCNC: 8 MG/DL (ref 6–20)
BUN/CREAT SERPL: 15.4 (ref 7–25)
CALCIUM SPEC-SCNC: 8.3 MG/DL (ref 8.6–10.5)
CHLORIDE SERPL-SCNC: 104 MMOL/L (ref 98–107)
CO2 SERPL-SCNC: 21 MMOL/L (ref 22–29)
CREAT SERPL-MCNC: 0.52 MG/DL (ref 0.57–1)
DEPRECATED RDW RBC AUTO: 41.6 FL (ref 37–54)
ERYTHROCYTE [DISTWIDTH] IN BLOOD BY AUTOMATED COUNT: 14 % (ref 12.3–15.4)
GFR SERPL CREATININE-BSD FRML MDRD: 147 ML/MIN/1.73
GLOBULIN UR ELPH-MCNC: 2.6 GM/DL
GLUCOSE SERPL-MCNC: 90 MG/DL (ref 65–99)
HCT VFR BLD AUTO: 34.5 % (ref 34–46.6)
HGB BLD-MCNC: 11.5 G/DL (ref 12–15.9)
HOLD SPECIMEN: NORMAL
MCH RBC QN AUTO: 28.4 PG (ref 26.6–33)
MCHC RBC AUTO-ENTMCNC: 33.4 G/DL (ref 31.5–35.7)
MCV RBC AUTO: 85.1 FL (ref 79–97)
PLATELET # BLD AUTO: 202 10*3/MM3 (ref 140–450)
PMV BLD AUTO: 10.9 FL (ref 6–12)
POTASSIUM SERPL-SCNC: 3.9 MMOL/L (ref 3.5–5.2)
PROT SERPL-MCNC: 6.1 G/DL (ref 6–8.5)
RBC # BLD AUTO: 4.05 10*6/MM3 (ref 3.77–5.28)
SODIUM SERPL-SCNC: 137 MMOL/L (ref 136–145)
WBC # BLD AUTO: 7.8 10*3/MM3 (ref 3.4–10.8)

## 2021-08-03 PROCEDURE — 80053 COMPREHEN METABOLIC PANEL: CPT | Performed by: OBSTETRICS & GYNECOLOGY

## 2021-08-03 PROCEDURE — 85027 COMPLETE CBC AUTOMATED: CPT | Performed by: OBSTETRICS & GYNECOLOGY

## 2021-08-03 PROCEDURE — G0463 HOSPITAL OUTPT CLINIC VISIT: HCPCS

## 2021-08-04 ENCOUNTER — HOSPITAL ENCOUNTER (INPATIENT)
Facility: HOSPITAL | Age: 21
LOS: 3 days | Discharge: HOME | End: 2021-08-07
Attending: OBSTETRICS & GYNECOLOGY | Admitting: OBSTETRICS & GYNECOLOGY

## 2021-08-04 ENCOUNTER — LAB (OUTPATIENT)
Dept: LAB | Facility: HOSPITAL | Age: 21
End: 2021-08-04

## 2021-08-04 ENCOUNTER — TRANSCRIBE ORDERS (OUTPATIENT)
Dept: ADMINISTRATIVE | Facility: HOSPITAL | Age: 21
End: 2021-08-04

## 2021-08-04 DIAGNOSIS — O14.93 PREECLAMPSIA, THIRD TRIMESTER: Primary | ICD-10-CM

## 2021-08-04 DIAGNOSIS — O14.93 PREECLAMPSIA, THIRD TRIMESTER: ICD-10-CM

## 2021-08-04 PROBLEM — R03.0 ELEVATED BP WITHOUT DIAGNOSIS OF HYPERTENSION: Status: ACTIVE | Noted: 2021-08-04

## 2021-08-04 LAB
ABO GROUP BLD: NORMAL
BLD GP AB SCN SERPL QL: NEGATIVE
DEPRECATED RDW RBC AUTO: 41.1 FL (ref 37–54)
ERYTHROCYTE [DISTWIDTH] IN BLOOD BY AUTOMATED COUNT: 14 % (ref 12.3–15.4)
HCT VFR BLD AUTO: 35.3 % (ref 34–46.6)
HGB BLD-MCNC: 11.9 G/DL (ref 12–15.9)
MCH RBC QN AUTO: 28.3 PG (ref 26.6–33)
MCHC RBC AUTO-ENTMCNC: 33.7 G/DL (ref 31.5–35.7)
MCV RBC AUTO: 84.2 FL (ref 79–97)
PLATELET # BLD AUTO: 222 10*3/MM3 (ref 140–450)
PMV BLD AUTO: 10.5 FL (ref 6–12)
PROT 24H UR-MRATE: 95 MG/24HOURS (ref 0–150)
RBC # BLD AUTO: 4.19 10*6/MM3 (ref 3.77–5.28)
RH BLD: POSITIVE
T&S EXPIRATION DATE: NORMAL
WBC # BLD AUTO: 10 10*3/MM3 (ref 3.4–10.8)

## 2021-08-04 PROCEDURE — 86900 BLOOD TYPING SEROLOGIC ABO: CPT

## 2021-08-04 PROCEDURE — 85027 COMPLETE CBC AUTOMATED: CPT | Performed by: OBSTETRICS & GYNECOLOGY

## 2021-08-04 PROCEDURE — 81050 URINALYSIS VOLUME MEASURE: CPT

## 2021-08-04 PROCEDURE — 84156 ASSAY OF PROTEIN URINE: CPT

## 2021-08-04 PROCEDURE — 86592 SYPHILIS TEST NON-TREP QUAL: CPT | Performed by: OBSTETRICS & GYNECOLOGY

## 2021-08-04 PROCEDURE — U0003 INFECTIOUS AGENT DETECTION BY NUCLEIC ACID (DNA OR RNA); SEVERE ACUTE RESPIRATORY SYNDROME CORONAVIRUS 2 (SARS-COV-2) (CORONAVIRUS DISEASE [COVID-19]), AMPLIFIED PROBE TECHNIQUE, MAKING USE OF HIGH THROUGHPUT TECHNOLOGIES AS DESCRIBED BY CMS-2020-01-R: HCPCS | Performed by: OBSTETRICS & GYNECOLOGY

## 2021-08-04 PROCEDURE — 86900 BLOOD TYPING SEROLOGIC ABO: CPT | Performed by: OBSTETRICS & GYNECOLOGY

## 2021-08-04 PROCEDURE — 86901 BLOOD TYPING SEROLOGIC RH(D): CPT | Performed by: OBSTETRICS & GYNECOLOGY

## 2021-08-04 PROCEDURE — 86850 RBC ANTIBODY SCREEN: CPT | Performed by: OBSTETRICS & GYNECOLOGY

## 2021-08-04 PROCEDURE — 86901 BLOOD TYPING SEROLOGIC RH(D): CPT

## 2021-08-04 RX ORDER — SODIUM CHLORIDE 0.9 % (FLUSH) 0.9 %
10 SYRINGE (ML) INJECTION EVERY 12 HOURS SCHEDULED
Status: DISCONTINUED | OUTPATIENT
Start: 2021-08-04 | End: 2021-08-06 | Stop reason: HOSPADM

## 2021-08-04 RX ORDER — OXYTOCIN-SODIUM CHLORIDE 0.9% IV SOLN 30 UNIT/500ML 30-0.9/5 UT/ML-%
2 SOLUTION INTRAVENOUS
Status: DISCONTINUED | OUTPATIENT
Start: 2021-08-04 | End: 2021-08-06

## 2021-08-04 RX ORDER — MAGNESIUM CARB/ALUMINUM HYDROX 105-160MG
30 TABLET,CHEWABLE ORAL ONCE
Status: DISCONTINUED | OUTPATIENT
Start: 2021-08-04 | End: 2021-08-06 | Stop reason: HOSPADM

## 2021-08-04 RX ORDER — SODIUM CHLORIDE, SODIUM LACTATE, POTASSIUM CHLORIDE, CALCIUM CHLORIDE 600; 310; 30; 20 MG/100ML; MG/100ML; MG/100ML; MG/100ML
125 INJECTION, SOLUTION INTRAVENOUS CONTINUOUS
Status: DISCONTINUED | OUTPATIENT
Start: 2021-08-04 | End: 2021-08-06

## 2021-08-04 RX ADMIN — SODIUM CHLORIDE, POTASSIUM CHLORIDE, SODIUM LACTATE AND CALCIUM CHLORIDE 125 ML/HR: 600; 310; 30; 20 INJECTION, SOLUTION INTRAVENOUS at 22:37

## 2021-08-04 RX ADMIN — OXYTOCIN 2 MILLI-UNITS/MIN: 10 INJECTION INTRAVENOUS at 22:37

## 2021-08-04 RX ADMIN — Medication 10 ML: at 22:36

## 2021-08-05 ENCOUNTER — ANESTHESIA EVENT (OUTPATIENT)
Dept: LABOR AND DELIVERY | Facility: HOSPITAL | Age: 21
End: 2021-08-05

## 2021-08-05 ENCOUNTER — ANESTHESIA (OUTPATIENT)
Dept: LABOR AND DELIVERY | Facility: HOSPITAL | Age: 21
End: 2021-08-05

## 2021-08-05 PROBLEM — Z34.90 PREGNANT: Status: RESOLVED | Noted: 2021-08-05 | Resolved: 2021-08-05

## 2021-08-05 PROBLEM — Z34.90 PREGNANT: Status: ACTIVE | Noted: 2021-08-05

## 2021-08-05 LAB
RPR SER QL: NORMAL
SARS-COV-2 RNA PNL SPEC NAA+PROBE: NOT DETECTED

## 2021-08-05 PROCEDURE — 25010000002 MORPHINE PER 10 MG: Performed by: OBSTETRICS & GYNECOLOGY

## 2021-08-05 PROCEDURE — 25010000002 ONDANSETRON PER 1 MG: Performed by: OBSTETRICS & GYNECOLOGY

## 2021-08-05 PROCEDURE — C1755 CATHETER, INTRASPINAL: HCPCS | Performed by: ANESTHESIOLOGY

## 2021-08-05 PROCEDURE — 3E033VJ INTRODUCTION OF OTHER HORMONE INTO PERIPHERAL VEIN, PERCUTANEOUS APPROACH: ICD-10-PCS | Performed by: OBSTETRICS & GYNECOLOGY

## 2021-08-05 PROCEDURE — 25010000002 FENTANYL CITRATE (PF) 250 MCG/5ML SOLUTION: Performed by: ANESTHESIOLOGY

## 2021-08-05 PROCEDURE — 0HQ9XZZ REPAIR PERINEUM SKIN, EXTERNAL APPROACH: ICD-10-PCS | Performed by: OBSTETRICS & GYNECOLOGY

## 2021-08-05 RX ORDER — OXYTOCIN-SODIUM CHLORIDE 0.9% IV SOLN 30 UNIT/500ML 30-0.9/5 UT/ML-%
250 SOLUTION INTRAVENOUS CONTINUOUS
Status: ACTIVE | OUTPATIENT
Start: 2021-08-05 | End: 2021-08-05

## 2021-08-05 RX ORDER — OXYTOCIN-SODIUM CHLORIDE 0.9% IV SOLN 30 UNIT/500ML 30-0.9/5 UT/ML-%
125 SOLUTION INTRAVENOUS CONTINUOUS PRN
Status: DISCONTINUED | OUTPATIENT
Start: 2021-08-05 | End: 2021-08-06 | Stop reason: HOSPADM

## 2021-08-05 RX ORDER — ONDANSETRON 2 MG/ML
4 INJECTION INTRAMUSCULAR; INTRAVENOUS EVERY 6 HOURS PRN
Status: DISCONTINUED | OUTPATIENT
Start: 2021-08-05 | End: 2021-08-06 | Stop reason: HOSPADM

## 2021-08-05 RX ORDER — METHYLERGONOVINE MALEATE 0.2 MG/ML
200 INJECTION INTRAVENOUS ONCE AS NEEDED
Status: DISCONTINUED | OUTPATIENT
Start: 2021-08-05 | End: 2021-08-06 | Stop reason: HOSPADM

## 2021-08-05 RX ORDER — FENTANYL 0.2 MG/100ML-BUPIV 0.125%-NACL 0.9% EPIDURAL INJ 2/0.125 MCG/ML-%
SOLUTION INJECTION CONTINUOUS
Status: DISCONTINUED | OUTPATIENT
Start: 2021-08-05 | End: 2021-08-06

## 2021-08-05 RX ORDER — FENTANYL 0.2 MG/100ML-BUPIV 0.125%-NACL 0.9% EPIDURAL INJ 2/0.125 MCG/ML-%
SOLUTION INJECTION CONTINUOUS PRN
Status: DISCONTINUED | OUTPATIENT
Start: 2021-08-05 | End: 2021-08-05 | Stop reason: SURG

## 2021-08-05 RX ORDER — EPHEDRINE SULFATE 50 MG/ML
5 INJECTION, SOLUTION INTRAVENOUS
Status: DISCONTINUED | OUTPATIENT
Start: 2021-08-05 | End: 2021-08-06 | Stop reason: HOSPADM

## 2021-08-05 RX ORDER — MISOPROSTOL 200 UG/1
800 TABLET ORAL ONCE AS NEEDED
Status: DISCONTINUED | OUTPATIENT
Start: 2021-08-05 | End: 2021-08-06 | Stop reason: HOSPADM

## 2021-08-05 RX ORDER — LIDOCAINE HYDROCHLORIDE 20 MG/ML
INJECTION, SOLUTION EPIDURAL; INFILTRATION; INTRACAUDAL; PERINEURAL
Status: COMPLETED
Start: 2021-08-05 | End: 2021-08-05

## 2021-08-05 RX ORDER — LIDOCAINE HYDROCHLORIDE AND EPINEPHRINE 15; 5 MG/ML; UG/ML
INJECTION, SOLUTION EPIDURAL AS NEEDED
Status: DISCONTINUED | OUTPATIENT
Start: 2021-08-05 | End: 2021-08-05 | Stop reason: SURG

## 2021-08-05 RX ORDER — LIDOCAINE HYDROCHLORIDE 20 MG/ML
INJECTION, SOLUTION EPIDURAL; INFILTRATION; INTRACAUDAL; PERINEURAL AS NEEDED
Status: DISCONTINUED | OUTPATIENT
Start: 2021-08-05 | End: 2021-08-05 | Stop reason: SURG

## 2021-08-05 RX ORDER — FENTANYL 0.2 MG/100ML-BUPIV 0.125%-NACL 0.9% EPIDURAL INJ 2/0.125 MCG/ML-%
SOLUTION INJECTION
Status: COMPLETED
Start: 2021-08-05 | End: 2021-08-05

## 2021-08-05 RX ORDER — OXYTOCIN-SODIUM CHLORIDE 0.9% IV SOLN 30 UNIT/500ML 30-0.9/5 UT/ML-%
999 SOLUTION INTRAVENOUS ONCE
Status: DISCONTINUED | OUTPATIENT
Start: 2021-08-05 | End: 2021-08-06 | Stop reason: HOSPADM

## 2021-08-05 RX ORDER — FENTANYL CITRATE 50 UG/ML
INJECTION, SOLUTION INTRAMUSCULAR; INTRAVENOUS
Status: DISPENSED
Start: 2021-08-05 | End: 2021-08-06

## 2021-08-05 RX ORDER — LIDOCAINE HYDROCHLORIDE 20 MG/ML
INJECTION, SOLUTION EPIDURAL; INFILTRATION; INTRACAUDAL; PERINEURAL
Status: DISPENSED
Start: 2021-08-05 | End: 2021-08-06

## 2021-08-05 RX ORDER — CARBOPROST TROMETHAMINE 250 UG/ML
250 INJECTION, SOLUTION INTRAMUSCULAR
Status: DISCONTINUED | OUTPATIENT
Start: 2021-08-05 | End: 2021-08-06 | Stop reason: HOSPADM

## 2021-08-05 RX ORDER — MORPHINE SULFATE 4 MG/ML
4 INJECTION, SOLUTION INTRAMUSCULAR; INTRAVENOUS
Status: DISCONTINUED | OUTPATIENT
Start: 2021-08-05 | End: 2021-08-06

## 2021-08-05 RX ORDER — FENTANYL CITRATE 50 UG/ML
INJECTION, SOLUTION INTRAMUSCULAR; INTRAVENOUS
Status: COMPLETED | OUTPATIENT
Start: 2021-08-05 | End: 2021-08-05

## 2021-08-05 RX ORDER — ONDANSETRON 4 MG/1
4 TABLET, FILM COATED ORAL EVERY 6 HOURS PRN
Status: DISCONTINUED | OUTPATIENT
Start: 2021-08-05 | End: 2021-08-06 | Stop reason: HOSPADM

## 2021-08-05 RX ADMIN — SODIUM CHLORIDE, POTASSIUM CHLORIDE, SODIUM LACTATE AND CALCIUM CHLORIDE 125 ML/HR: 600; 310; 30; 20 INJECTION, SOLUTION INTRAVENOUS at 06:23

## 2021-08-05 RX ADMIN — FENTANYL CITRATE 25 MCG: 0.05 INJECTION, SOLUTION INTRAMUSCULAR; INTRAVENOUS at 17:50

## 2021-08-05 RX ADMIN — WITCH HAZEL 1 PAD: 500 SOLUTION RECTAL; TOPICAL at 23:57

## 2021-08-05 RX ADMIN — ONDANSETRON 4 MG: 2 INJECTION INTRAMUSCULAR; INTRAVENOUS at 08:39

## 2021-08-05 RX ADMIN — LIDOCAINE HYDROCHLORIDE AND EPINEPHRINE 3 ML: 15; 5 INJECTION, SOLUTION EPIDURAL at 17:50

## 2021-08-05 RX ADMIN — Medication 10 ML/HR: at 10:32

## 2021-08-05 RX ADMIN — OXYTOCIN 250 ML/HR: 10 INJECTION INTRAVENOUS at 22:16

## 2021-08-05 RX ADMIN — LIDOCAINE HYDROCHLORIDE 100 MG: 20 INJECTION, SOLUTION EPIDURAL; INFILTRATION; INTRACAUDAL; PERINEURAL at 15:20

## 2021-08-05 RX ADMIN — SODIUM CHLORIDE, POTASSIUM CHLORIDE, SODIUM LACTATE AND CALCIUM CHLORIDE 125 ML/HR: 600; 310; 30; 20 INJECTION, SOLUTION INTRAVENOUS at 10:20

## 2021-08-05 RX ADMIN — SODIUM CHLORIDE, POTASSIUM CHLORIDE, SODIUM LACTATE AND CALCIUM CHLORIDE 125 ML/HR: 600; 310; 30; 20 INJECTION, SOLUTION INTRAVENOUS at 16:18

## 2021-08-05 RX ADMIN — Medication 10 ML: at 21:00

## 2021-08-05 RX ADMIN — MORPHINE SULFATE 4 MG: 4 INJECTION INTRAVENOUS at 08:50

## 2021-08-05 RX ADMIN — BENZOCAINE AND LEVOMENTHOL 1 APPLICATION: 200; 5 SPRAY TOPICAL at 23:57

## 2021-08-05 RX ADMIN — MORPHINE SULFATE 4 MG: 4 INJECTION INTRAVENOUS at 17:34

## 2021-08-05 RX ADMIN — LIDOCAINE HYDROCHLORIDE AND EPINEPHRINE 5 ML: 15; 5 INJECTION, SOLUTION EPIDURAL at 10:28

## 2021-08-05 NOTE — ANESTHESIA PROCEDURE NOTES
CSE Block      Patient reassessed immediately prior to procedure    Patient location during procedure: OB  Reason for block: procedure for pain  Staffing  Anesthesiologist: Guillermo Hammond MD  Preanesthetic Checklist  Completed: patient identified, IV checked, site marked, risks and benefits discussed, surgical consent, monitors and equipment checked, pre-op evaluation and timeout performed  CSE  Patient position: sitting  Patient monitoring: blood pressure monitoring, continuous pulse oximetry and EKG  Procedures: landmark technique and palpation technique  Spinal Needle  Needle type: Sprotte tip   Needle gauge: 27 G  Approach: midline  Location: L3-4  Fluid Appearance: clear    Epidural Needle  Injection technique: OBIE saline  Needle type: Medisastead   Needle gauge: 18 G  Location: L3-L4  Loss of Resistance: 5cm  Cath Depth at Skin (cm): 10  Aspiration: negative  Test dose: negative  Epidural medications: fentaNYL Citrate (PF) (SUBLIMAZE) injection, 25 mcg  Medication administered: 8/5/2021 5:50 PM  Catheter  Catheter type: end hole  Catheter size: 20 G  Assessment  Dressing:occlusive dressing applied and secured with tape  Pt Tolerance:patient tolerated the procedure well with no apparent complications  Complications:no  Additional Notes  Lido 2% used for skin local.  Lido 1.5% 3 ml used for test Dose.  Fentanyl 25mcg for intrathecal narcotic dose.  75 mcg wasted at end of case w RN.

## 2021-08-05 NOTE — ANESTHESIA PROCEDURE NOTES
Labor Epidural      Patient reassessed immediately prior to procedure    Patient location during procedure: OB  Indication:at surgeon's request  Performed By  Anesthesiologist: Vince Juárez MD  Preanesthetic Checklist  Completed: patient identified, IV checked, site marked, risks and benefits discussed, surgical consent, monitors and equipment checked, pre-op evaluation and timeout performed  Prep:  Pt Position:sitting  Sterile Tech:gloves, cap, sterile barrier and mask  Prep:povidone-iodine 7.5% surgical scrub  Monitoring:continuous pulse oximetry, EKG and blood pressure monitoring  Epidural Block Procedure:  Approach:midline  Guidance:palpation technique  Location:L3-L4  Needle Type:Tuohy  Needle Gauge:17 G  Loss of Resistance Medium: saline  Loss of Resistance: 6cm  Cath Depth at skin:12 cm  Paresthesia: none  Aspiration:negative  Test Dose:negative  Med administered at 8/5/2021 10:26 AM  Number of Attempts: 2  Post Assessment:  Dressing:secured with tape and occlusive dressing applied  Pt Tolerance:patient tolerated the procedure well with no apparent complications  Complications:no

## 2021-08-05 NOTE — ANESTHESIA PREPROCEDURE EVALUATION
Anesthesia Evaluation     Patient summary reviewed and Nursing notes reviewed   no history of anesthetic complications:  NPO Solid Status: N/A  NPO Liquid Status: N/A           Airway   Mallampati: II  TM distance: >3 FB  Neck ROM: full  No difficulty expected  Dental - normal exam     Pulmonary - normal exam   (+) a smoker Former Abstained day of surgery, asthma,  Cardiovascular - normal exam    (+) hyperlipidemia,       Neuro/Psych  (+) dizziness/light headedness, syncope, psychiatric history Anxiety and Depression,     GI/Hepatic/Renal/Endo    (+)  GERD,      Musculoskeletal     Abdominal  - normal exam    Bowel sounds: normal.   Substance History      OB/GYN    (+) Pregnant,         Other                          Anesthesia Plan    ASA 2     epidural       Anesthetic plan, all risks, benefits, and alternatives have been provided, discussed and informed consent has been obtained with: patient.

## 2021-08-06 LAB
BASOPHILS # BLD AUTO: 0.1 10*3/MM3 (ref 0–0.2)
BASOPHILS NFR BLD AUTO: 0.4 % (ref 0–1.5)
DEPRECATED RDW RBC AUTO: 41.1 FL (ref 37–54)
EOSINOPHIL # BLD AUTO: 0.1 10*3/MM3 (ref 0–0.4)
EOSINOPHIL NFR BLD AUTO: 0.4 % (ref 0.3–6.2)
ERYTHROCYTE [DISTWIDTH] IN BLOOD BY AUTOMATED COUNT: 14 % (ref 12.3–15.4)
HCT VFR BLD AUTO: 28.5 % (ref 34–46.6)
HGB BLD-MCNC: 9.6 G/DL (ref 12–15.9)
LYMPHOCYTES # BLD AUTO: 2.3 10*3/MM3 (ref 0.7–3.1)
LYMPHOCYTES NFR BLD AUTO: 15.9 % (ref 19.6–45.3)
MCH RBC QN AUTO: 28.5 PG (ref 26.6–33)
MCHC RBC AUTO-ENTMCNC: 33.7 G/DL (ref 31.5–35.7)
MCV RBC AUTO: 84.5 FL (ref 79–97)
MONOCYTES # BLD AUTO: 1.2 10*3/MM3 (ref 0.1–0.9)
MONOCYTES NFR BLD AUTO: 8.3 % (ref 5–12)
NEUTROPHILS NFR BLD AUTO: 11.1 10*3/MM3 (ref 1.7–7)
NEUTROPHILS NFR BLD AUTO: 75 % (ref 42.7–76)
NRBC BLD AUTO-RTO: 0.1 /100 WBC (ref 0–0.2)
PLATELET # BLD AUTO: 181 10*3/MM3 (ref 140–450)
PMV BLD AUTO: 10.9 FL (ref 6–12)
RBC # BLD AUTO: 3.38 10*6/MM3 (ref 3.77–5.28)
WBC # BLD AUTO: 14.7 10*3/MM3 (ref 3.4–10.8)

## 2021-08-06 PROCEDURE — 85025 COMPLETE CBC W/AUTO DIFF WBC: CPT | Performed by: OBSTETRICS & GYNECOLOGY

## 2021-08-06 RX ORDER — DOCUSATE SODIUM 100 MG/1
100 CAPSULE, LIQUID FILLED ORAL 2 TIMES DAILY
Status: DISCONTINUED | OUTPATIENT
Start: 2021-08-06 | End: 2021-08-07 | Stop reason: HOSPADM

## 2021-08-06 RX ORDER — LANOLIN 100 %
OINTMENT (GRAM) TOPICAL
Status: DISCONTINUED | OUTPATIENT
Start: 2021-08-06 | End: 2021-08-07 | Stop reason: HOSPADM

## 2021-08-06 RX ORDER — ONDANSETRON 4 MG/1
4 TABLET, FILM COATED ORAL EVERY 8 HOURS PRN
Status: DISCONTINUED | OUTPATIENT
Start: 2021-08-06 | End: 2021-08-07 | Stop reason: HOSPADM

## 2021-08-06 RX ORDER — SODIUM CHLORIDE 0.9 % (FLUSH) 0.9 %
1-10 SYRINGE (ML) INJECTION AS NEEDED
Status: DISCONTINUED | OUTPATIENT
Start: 2021-08-06 | End: 2021-08-07 | Stop reason: HOSPADM

## 2021-08-06 RX ORDER — OXYTOCIN-SODIUM CHLORIDE 0.9% IV SOLN 30 UNIT/500ML 30-0.9/5 UT/ML-%
250 SOLUTION INTRAVENOUS CONTINUOUS
Status: ACTIVE | OUTPATIENT
Start: 2021-08-06 | End: 2021-08-06

## 2021-08-06 RX ORDER — HYDROCORTISONE ACETATE PRAMOXINE HCL 2.5; 1 G/100G; G/100G
1 CREAM TOPICAL AS NEEDED
Status: DISCONTINUED | OUTPATIENT
Start: 2021-08-06 | End: 2021-08-07 | Stop reason: HOSPADM

## 2021-08-06 RX ORDER — PRENATAL VIT/IRON FUM/FOLIC AC 27MG-0.8MG
1 TABLET ORAL DAILY
Status: DISCONTINUED | OUTPATIENT
Start: 2021-08-06 | End: 2021-08-07 | Stop reason: HOSPADM

## 2021-08-06 RX ORDER — BISACODYL 10 MG
10 SUPPOSITORY, RECTAL RECTAL DAILY PRN
Status: DISCONTINUED | OUTPATIENT
Start: 2021-08-06 | End: 2021-08-07 | Stop reason: HOSPADM

## 2021-08-06 RX ORDER — OXYTOCIN-SODIUM CHLORIDE 0.9% IV SOLN 30 UNIT/500ML 30-0.9/5 UT/ML-%
125 SOLUTION INTRAVENOUS CONTINUOUS PRN
Status: DISCONTINUED | OUTPATIENT
Start: 2021-08-06 | End: 2021-08-07 | Stop reason: HOSPADM

## 2021-08-06 RX ORDER — IBUPROFEN 600 MG/1
600 TABLET ORAL EVERY 6 HOURS PRN
Status: DISCONTINUED | OUTPATIENT
Start: 2021-08-06 | End: 2021-08-07 | Stop reason: HOSPADM

## 2021-08-06 RX ORDER — OXYTOCIN-SODIUM CHLORIDE 0.9% IV SOLN 30 UNIT/500ML 30-0.9/5 UT/ML-%
999 SOLUTION INTRAVENOUS ONCE
Status: DISCONTINUED | OUTPATIENT
Start: 2021-08-06 | End: 2021-08-07 | Stop reason: HOSPADM

## 2021-08-06 RX ADMIN — IBUPROFEN 600 MG: 600 TABLET, FILM COATED ORAL at 01:47

## 2021-08-06 RX ADMIN — DOCUSATE SODIUM 100 MG: 100 CAPSULE, LIQUID FILLED ORAL at 09:05

## 2021-08-06 RX ADMIN — IBUPROFEN 600 MG: 600 TABLET, FILM COATED ORAL at 16:03

## 2021-08-06 RX ADMIN — DOCUSATE SODIUM 100 MG: 100 CAPSULE, LIQUID FILLED ORAL at 21:17

## 2021-08-06 RX ADMIN — PRENATAL VITAMINS-IRON FUMARATE 27 MG IRON-FOLIC ACID 0.8 MG TABLET 1 TABLET: at 09:05

## 2021-08-06 NOTE — ANESTHESIA POSTPROCEDURE EVALUATION
Patient: Gregory Baez    Procedure Summary     Date: 08/05/21 Room / Location:     Anesthesia Start: 0959 Anesthesia Stop: 2131    Procedure: LABOR ANALGESIA Diagnosis:     Scheduled Providers:  Provider: Vince Juárez MD    Anesthesia Type: epidural ASA Status: 2          Anesthesia Type: epidural    Vitals  Vitals Value Taken Time   /85 08/05/21 2200   Temp 98.1 °F (36.7 °C) 08/05/21 2000   Pulse 116 08/05/21 2209   Resp 18 08/05/21 1800   SpO2 98 % 08/05/21 2209   Vitals shown include unvalidated device data.        Post Anesthesia Care and Evaluation    Patient location during evaluation: PACU  Patient participation: complete - patient participated  Level of consciousness: awake  Pain scale: See nurse's notes for pain score.  Pain management: adequate  Airway patency: patent  Anesthetic complications: No anesthetic complications  PONV Status: none  Cardiovascular status: acceptable  Respiratory status: acceptable  Hydration status: acceptable  Post Neuraxial Block status: Motor and sensory function returned to baseline and No signs or symptoms of PDPH  Comments: Patient seen and examined postoperatively; vital signs stable; SpO2 greater than or equal to 90%; cardiopulmonary status stable; nausea/vomiting adequately controlled; pain adequately controlled; no apparent anesthesia complications; patient discharged from anesthesia care when discharge criteria were met

## 2021-08-07 VITALS
SYSTOLIC BLOOD PRESSURE: 120 MMHG | HEART RATE: 89 BPM | OXYGEN SATURATION: 96 % | DIASTOLIC BLOOD PRESSURE: 81 MMHG | HEIGHT: 63 IN | TEMPERATURE: 98.9 F | WEIGHT: 203.48 LBS | BODY MASS INDEX: 36.05 KG/M2 | RESPIRATION RATE: 16 BRPM

## 2021-08-07 RX ADMIN — DOCUSATE SODIUM 100 MG: 100 CAPSULE, LIQUID FILLED ORAL at 08:02

## 2021-08-07 RX ADMIN — PRENATAL VITAMINS-IRON FUMARATE 27 MG IRON-FOLIC ACID 0.8 MG TABLET 1 TABLET: at 08:02

## 2021-08-07 RX ADMIN — IBUPROFEN 600 MG: 600 TABLET, FILM COATED ORAL at 08:01

## 2022-07-19 ENCOUNTER — OFFICE VISIT (OUTPATIENT)
Dept: FAMILY MEDICINE CLINIC | Facility: CLINIC | Age: 22
End: 2022-07-19

## 2022-07-19 VITALS
DIASTOLIC BLOOD PRESSURE: 95 MMHG | HEART RATE: 85 BPM | OXYGEN SATURATION: 97 % | TEMPERATURE: 98.2 F | BODY MASS INDEX: 32.21 KG/M2 | SYSTOLIC BLOOD PRESSURE: 157 MMHG | HEIGHT: 63 IN | WEIGHT: 181.8 LBS

## 2022-07-19 DIAGNOSIS — J45.20 MILD INTERMITTENT ASTHMA, UNSPECIFIED WHETHER COMPLICATED: ICD-10-CM

## 2022-07-19 DIAGNOSIS — R10.11 RUQ ABDOMINAL PAIN: ICD-10-CM

## 2022-07-19 DIAGNOSIS — Z13.220 SCREENING CHOLESTEROL LEVEL: ICD-10-CM

## 2022-07-19 DIAGNOSIS — Z00.01 ENCOUNTER FOR ANNUAL GENERAL MEDICAL EXAMINATION WITH ABNORMAL FINDINGS IN ADULT: Primary | ICD-10-CM

## 2022-07-19 DIAGNOSIS — F41.9 ANXIETY: ICD-10-CM

## 2022-07-19 DIAGNOSIS — E78.49 OTHER HYPERLIPIDEMIA: ICD-10-CM

## 2022-07-19 DIAGNOSIS — K21.9 GASTROESOPHAGEAL REFLUX DISEASE, UNSPECIFIED WHETHER ESOPHAGITIS PRESENT: ICD-10-CM

## 2022-07-19 DIAGNOSIS — F32.A DEPRESSION, UNSPECIFIED DEPRESSION TYPE: ICD-10-CM

## 2022-07-19 DIAGNOSIS — E55.9 VITAMIN D DEFICIENCY: ICD-10-CM

## 2022-07-19 DIAGNOSIS — R87.612 LOW GRADE SQUAMOUS INTRAEPITHELIAL LESION ON CYTOLOGIC SMEAR OF CERVIX (LGSIL): ICD-10-CM

## 2022-07-19 DIAGNOSIS — R19.8 ABNORMAL BOWEL HABITS: ICD-10-CM

## 2022-07-19 DIAGNOSIS — R03.0 ELEVATED BP WITHOUT DIAGNOSIS OF HYPERTENSION: ICD-10-CM

## 2022-07-19 DIAGNOSIS — Z13.1 SCREENING FOR DIABETES MELLITUS: ICD-10-CM

## 2022-07-19 DIAGNOSIS — R00.2 PALPITATIONS: ICD-10-CM

## 2022-07-19 DIAGNOSIS — R42 DIZZINESS: ICD-10-CM

## 2022-07-19 DIAGNOSIS — R55 VASOVAGAL SYNCOPE: ICD-10-CM

## 2022-07-19 PROCEDURE — 99214 OFFICE O/P EST MOD 30 MIN: CPT | Performed by: PREVENTIVE MEDICINE

## 2022-07-19 PROCEDURE — 99395 PREV VISIT EST AGE 18-39: CPT | Performed by: PREVENTIVE MEDICINE

## 2022-07-19 PROCEDURE — 2014F MENTAL STATUS ASSESS: CPT | Performed by: PREVENTIVE MEDICINE

## 2022-07-19 PROCEDURE — 3008F BODY MASS INDEX DOCD: CPT | Performed by: PREVENTIVE MEDICINE

## 2022-07-19 RX ORDER — NEBULIZER AND COMPRESSOR
1 EACH MISCELLANEOUS 2 TIMES DAILY
Qty: 1 EACH | Refills: 0 | Status: SHIPPED | OUTPATIENT
Start: 2022-07-19 | End: 2022-08-29

## 2022-07-19 RX ORDER — ESCITALOPRAM OXALATE 5 MG/1
5 TABLET ORAL DAILY
Qty: 30 TABLET | Refills: 1 | Status: SHIPPED | OUTPATIENT
Start: 2022-07-19 | End: 2022-08-30 | Stop reason: DRUGHIGH

## 2022-07-19 NOTE — PATIENT INSTRUCTIONS
Health Maintenance Due   Topic Date Due    COVID-19 Vaccine (1) Never done    Pneumococcal Vaccine 0-64 (1 - PCV) 09/12/2004    TDAP/TD VACCINES (2 - Td or Tdap) 09/14/2020    CHLAMYDIA SCREENING  07/13/2021    ANNUAL PHYSICAL  07/14/2021    LIPID PANEL  11/25/2021    12 hour fast for labs    Call if depression or anxiety worsens.  Tell  has started meds and call if worsens    Check blood pressure cuff for accuracy and send 10 blood pressures over 2 weeks.  Watch sodium, alcohol and weight
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
## 2022-07-20 ENCOUNTER — CLINICAL SUPPORT (OUTPATIENT)
Dept: FAMILY MEDICINE CLINIC | Facility: CLINIC | Age: 22
End: 2022-07-20

## 2022-07-20 DIAGNOSIS — Z13.220 SCREENING CHOLESTEROL LEVEL: ICD-10-CM

## 2022-07-20 DIAGNOSIS — Z13.1 SCREENING FOR DIABETES MELLITUS: ICD-10-CM

## 2022-07-20 DIAGNOSIS — E55.9 VITAMIN D DEFICIENCY: ICD-10-CM

## 2022-07-20 DIAGNOSIS — R00.2 PALPITATIONS: ICD-10-CM

## 2022-07-20 PROCEDURE — 80050 GENERAL HEALTH PANEL: CPT | Performed by: PREVENTIVE MEDICINE

## 2022-07-20 PROCEDURE — 82306 VITAMIN D 25 HYDROXY: CPT | Performed by: PREVENTIVE MEDICINE

## 2022-07-20 PROCEDURE — 36415 COLL VENOUS BLD VENIPUNCTURE: CPT | Performed by: PREVENTIVE MEDICINE

## 2022-07-20 PROCEDURE — 80061 LIPID PANEL: CPT | Performed by: PREVENTIVE MEDICINE

## 2022-07-20 PROCEDURE — 83735 ASSAY OF MAGNESIUM: CPT | Performed by: PREVENTIVE MEDICINE

## 2022-07-20 PROCEDURE — 82607 VITAMIN B-12: CPT | Performed by: PREVENTIVE MEDICINE

## 2022-07-20 NOTE — PROGRESS NOTES
Venipuncture Blood Specimen Collection  Venipuncture performed in right arm by Eva Arreola MA with good hemostasis. Patient tolerated the procedure well without complications.   07/20/22   vEa Arreola MA

## 2022-07-21 ENCOUNTER — TELEPHONE (OUTPATIENT)
Dept: FAMILY MEDICINE CLINIC | Facility: CLINIC | Age: 22
End: 2022-07-21

## 2022-07-21 LAB
25(OH)D3 SERPL-MCNC: 37.4 NG/ML (ref 30–100)
ALBUMIN SERPL-MCNC: 4.8 G/DL (ref 3.5–5.2)
ALBUMIN/GLOB SERPL: 2 G/DL
ALP SERPL-CCNC: 57 U/L (ref 39–117)
ALT SERPL W P-5'-P-CCNC: 25 U/L (ref 1–33)
ANION GAP SERPL CALCULATED.3IONS-SCNC: 12.1 MMOL/L (ref 5–15)
AST SERPL-CCNC: 18 U/L (ref 1–32)
BASOPHILS # BLD AUTO: 0.06 10*3/MM3 (ref 0–0.2)
BASOPHILS NFR BLD AUTO: 0.9 % (ref 0–1.5)
BILIRUB SERPL-MCNC: 0.3 MG/DL (ref 0–1.2)
BUN SERPL-MCNC: 10 MG/DL (ref 6–20)
BUN/CREAT SERPL: 17.9 (ref 7–25)
CALCIUM SPEC-SCNC: 9.3 MG/DL (ref 8.6–10.5)
CHLORIDE SERPL-SCNC: 101 MMOL/L (ref 98–107)
CHOLEST SERPL-MCNC: 184 MG/DL (ref 0–200)
CO2 SERPL-SCNC: 22.9 MMOL/L (ref 22–29)
CREAT SERPL-MCNC: 0.56 MG/DL (ref 0.57–1)
DEPRECATED RDW RBC AUTO: 41.2 FL (ref 37–54)
EGFRCR SERPLBLD CKD-EPI 2021: 131.7 ML/MIN/1.73
EOSINOPHIL # BLD AUTO: 0.06 10*3/MM3 (ref 0–0.4)
EOSINOPHIL NFR BLD AUTO: 0.9 % (ref 0.3–6.2)
ERYTHROCYTE [DISTWIDTH] IN BLOOD BY AUTOMATED COUNT: 13.1 % (ref 12.3–15.4)
GLOBULIN UR ELPH-MCNC: 2.4 GM/DL
GLUCOSE SERPL-MCNC: 71 MG/DL (ref 65–99)
HCT VFR BLD AUTO: 41.3 % (ref 34–46.6)
HDLC SERPL-MCNC: 40 MG/DL (ref 40–60)
HGB BLD-MCNC: 13.8 G/DL (ref 12–15.9)
IMM GRANULOCYTES # BLD AUTO: 0.01 10*3/MM3 (ref 0–0.05)
IMM GRANULOCYTES NFR BLD AUTO: 0.1 % (ref 0–0.5)
LDLC SERPL CALC-MCNC: 116 MG/DL (ref 0–100)
LDLC/HDLC SERPL: 2.82 {RATIO}
LYMPHOCYTES # BLD AUTO: 2.15 10*3/MM3 (ref 0.7–3.1)
LYMPHOCYTES NFR BLD AUTO: 31.6 % (ref 19.6–45.3)
MAGNESIUM SERPL-MCNC: 2.1 MG/DL (ref 1.6–2.6)
MCH RBC QN AUTO: 29.2 PG (ref 26.6–33)
MCHC RBC AUTO-ENTMCNC: 33.4 G/DL (ref 31.5–35.7)
MCV RBC AUTO: 87.3 FL (ref 79–97)
MONOCYTES # BLD AUTO: 0.59 10*3/MM3 (ref 0.1–0.9)
MONOCYTES NFR BLD AUTO: 8.7 % (ref 5–12)
NEUTROPHILS NFR BLD AUTO: 3.93 10*3/MM3 (ref 1.7–7)
NEUTROPHILS NFR BLD AUTO: 57.8 % (ref 42.7–76)
NRBC BLD AUTO-RTO: 0 /100 WBC (ref 0–0.2)
PLATELET # BLD AUTO: 282 10*3/MM3 (ref 140–450)
PMV BLD AUTO: 12.8 FL (ref 6–12)
POTASSIUM SERPL-SCNC: 3.8 MMOL/L (ref 3.5–5.2)
PROT SERPL-MCNC: 7.2 G/DL (ref 6–8.5)
RBC # BLD AUTO: 4.73 10*6/MM3 (ref 3.77–5.28)
SODIUM SERPL-SCNC: 136 MMOL/L (ref 136–145)
TRIGL SERPL-MCNC: 156 MG/DL (ref 0–150)
TSH SERPL DL<=0.05 MIU/L-ACNC: 1.18 UIU/ML (ref 0.27–4.2)
VIT B12 BLD-MCNC: 430 PG/ML (ref 211–946)
VLDLC SERPL-MCNC: 28 MG/DL (ref 5–40)
WBC NRBC COR # BLD: 6.8 10*3/MM3 (ref 3.4–10.8)

## 2022-07-21 NOTE — PROGRESS NOTES
Vitamin B12 is slightly low you may take 1000 units a couple of days of week in order to improve this.  Bad cholesterol is elevated to 116 and goal is below 100 so decrease saturated fats and increase exercise.  Rest of the labs look good call if any other questions or concerns and we will see you back as advised.

## 2022-07-21 NOTE — TELEPHONE ENCOUNTER
HUB TO READ:  ----- Message from Anna Kenney MD sent at 7/21/2022  7:07 AM EDT -----  Vitamin B12 is slightly low you may take 1000 units a couple of days of week in order to improve this.  Bad cholesterol is elevated to 116 and goal is below 100 so decrease saturated fats and increase exercise.  Rest of the labs look good call if any other questions or concerns and we will see you back as advised.

## 2022-08-29 NOTE — PATIENT INSTRUCTIONS
Health Maintenance Due   Topic Date Due    COVID-19 Vaccine (1) Never done    Pneumococcal Vaccine 0-64 (1 - PCV) 09/12/2004    TDAP/TD VACCINES (2 - Td or Tdap) 09/14/2020    CHLAMYDIA SCREENING  07/13/2021    Call 3 weeks to report progress.

## 2022-08-30 ENCOUNTER — HOSPITAL ENCOUNTER (OUTPATIENT)
Dept: GENERAL RADIOLOGY | Facility: HOSPITAL | Age: 22
Discharge: HOME | End: 2022-08-30
Admitting: PREVENTIVE MEDICINE

## 2022-08-30 ENCOUNTER — OFFICE VISIT (OUTPATIENT)
Dept: FAMILY MEDICINE CLINIC | Facility: CLINIC | Age: 22
End: 2022-08-30

## 2022-08-30 VITALS
DIASTOLIC BLOOD PRESSURE: 85 MMHG | OXYGEN SATURATION: 98 % | TEMPERATURE: 98.6 F | WEIGHT: 175.2 LBS | BODY MASS INDEX: 31.04 KG/M2 | HEART RATE: 73 BPM | HEIGHT: 63 IN | SYSTOLIC BLOOD PRESSURE: 128 MMHG

## 2022-08-30 DIAGNOSIS — S92.522A: ICD-10-CM

## 2022-08-30 DIAGNOSIS — E78.49 OTHER HYPERLIPIDEMIA: ICD-10-CM

## 2022-08-30 DIAGNOSIS — F41.9 ANXIETY: ICD-10-CM

## 2022-08-30 DIAGNOSIS — R10.11 RUQ ABDOMINAL PAIN: ICD-10-CM

## 2022-08-30 DIAGNOSIS — R42 DIZZINESS: ICD-10-CM

## 2022-08-30 DIAGNOSIS — R00.2 PALPITATIONS: ICD-10-CM

## 2022-08-30 DIAGNOSIS — Z11.8 ENCOUNTER FOR SCREENING EXAMINATION FOR CHLAMYDIAL INFECTION: Primary | ICD-10-CM

## 2022-08-30 DIAGNOSIS — J45.20 MILD INTERMITTENT ASTHMA, UNSPECIFIED WHETHER COMPLICATED: ICD-10-CM

## 2022-08-30 DIAGNOSIS — K21.9 GASTROESOPHAGEAL REFLUX DISEASE, UNSPECIFIED WHETHER ESOPHAGITIS PRESENT: ICD-10-CM

## 2022-08-30 DIAGNOSIS — R55 VASOVAGAL SYNCOPE: ICD-10-CM

## 2022-08-30 DIAGNOSIS — E66.09 CLASS 1 OBESITY DUE TO EXCESS CALORIES WITH SERIOUS COMORBIDITY AND BODY MASS INDEX (BMI) OF 31.0 TO 31.9 IN ADULT: ICD-10-CM

## 2022-08-30 DIAGNOSIS — Z32.00 POSSIBLE PREGNANCY: ICD-10-CM

## 2022-08-30 DIAGNOSIS — F32.A DEPRESSION, UNSPECIFIED DEPRESSION TYPE: ICD-10-CM

## 2022-08-30 PROBLEM — E66.9 OBESITY: Status: ACTIVE | Noted: 2022-08-30

## 2022-08-30 PROBLEM — E66.811 CLASS 1 OBESITY DUE TO EXCESS CALORIES WITH SERIOUS COMORBIDITY AND BODY MASS INDEX (BMI) OF 31.0 TO 31.9 IN ADULT: Status: ACTIVE | Noted: 2022-08-30

## 2022-08-30 LAB
B-HCG UR QL: NEGATIVE
EXPIRATION DATE: NORMAL
INTERNAL NEGATIVE CONTROL: NEGATIVE
INTERNAL POSITIVE CONTROL: POSITIVE
Lab: NORMAL

## 2022-08-30 PROCEDURE — 2014F MENTAL STATUS ASSESS: CPT | Performed by: PREVENTIVE MEDICINE

## 2022-08-30 PROCEDURE — 3008F BODY MASS INDEX DOCD: CPT | Performed by: PREVENTIVE MEDICINE

## 2022-08-30 PROCEDURE — 99395 PREV VISIT EST AGE 18-39: CPT | Performed by: PREVENTIVE MEDICINE

## 2022-08-30 PROCEDURE — 87491 CHLMYD TRACH DNA AMP PROBE: CPT | Performed by: PREVENTIVE MEDICINE

## 2022-08-30 PROCEDURE — 81025 URINE PREGNANCY TEST: CPT | Performed by: PREVENTIVE MEDICINE

## 2022-08-30 PROCEDURE — 73630 X-RAY EXAM OF FOOT: CPT

## 2022-08-30 PROCEDURE — 99213 OFFICE O/P EST LOW 20 MIN: CPT | Performed by: PREVENTIVE MEDICINE

## 2022-08-30 RX ORDER — ESCITALOPRAM OXALATE 10 MG/1
10 TABLET ORAL DAILY
Qty: 30 TABLET | Refills: 3 | Status: SHIPPED | OUTPATIENT
Start: 2022-08-30 | End: 2022-12-05 | Stop reason: DRUGHIGH

## 2022-08-30 NOTE — PROGRESS NOTES
"Subjective   Gregory Baez is a 21 y.o. female presents for   Chief Complaint   Patient presents with   • Follow-up   • Toe Injury     Left foot    Patient presents today for her annual wellness exam and has been advised to wear sunscreen and a seatbelt.  She is also here to check up on her depression and anxiety she feels as though this is improved immensely but she would like to increase her Lexapro to 10.  She feels as though anxiety is still controlling her life and is being seen by the counselor but not yet seen behavioral health.  We will consider putting her on an antianxiety medicine after increasing the Lexapro.  Finally she tripped over something and sustained what looks like a closed fracture of her left fourth toe.  X-rays will be pending finally she feels as though she could be pregnant and has been having unprotected sex with her medications and studies being ordered we will obtain a pregnancy test and that was negative today    Health Maintenance Due   Topic Date Due   • COVID-19 Vaccine (1) Never done   • Pneumococcal Vaccine 0-64 (1 - PCV) 09/12/2004   • TDAP/TD VACCINES (2 - Td or Tdap) 09/14/2020   • CHLAMYDIA SCREENING  07/13/2021       History of Present Illness     Vitals:    08/30/22 1325 08/30/22 1329   BP: 133/89 128/85   BP Location: Right arm Left arm   Patient Position: Sitting Sitting   Cuff Size: Adult Adult   Pulse: 73    Temp: 98.6 °F (37 °C)    TempSrc: Temporal    SpO2: 98%    Weight: 79.5 kg (175 lb 3.2 oz)    Height: 160 cm (62.99\")      Body mass index is 31.04 kg/m².    Current Outpatient Medications on File Prior to Visit   Medication Sig Dispense Refill   • [DISCONTINUED] escitalopram (Lexapro) 5 MG tablet Take 1 tablet by mouth Daily. 30 tablet 1   • [DISCONTINUED] Prenatal Vit-DSS-Fe Cbn-FA (PRENATAL OPTIMA ADVANCE PO) Take  by mouth.       No current facility-administered medications on file prior to visit.       The following portions of the patient's history were " reviewed and updated as appropriate: allergies, current medications, past family history, past medical history, past social history, past surgical history and problem list.    Review of Systems   Musculoskeletal: Positive for gait problem and joint swelling.   Psychiatric/Behavioral: Positive for depressed mood. The patient is nervous/anxious.        Objective   Physical Exam  Vitals reviewed.   Constitutional:       General: She is not in acute distress.     Appearance: She is well-developed. She is obese. She is not ill-appearing or toxic-appearing.   HENT:      Head: Normocephalic and atraumatic.      Right Ear: Tympanic membrane, ear canal and external ear normal.      Left Ear: Tympanic membrane, ear canal and external ear normal.      Nose: Nose normal.      Mouth/Throat:      Mouth: Mucous membranes are moist.      Pharynx: No posterior oropharyngeal erythema.   Eyes:      Extraocular Movements: Extraocular movements intact.      Conjunctiva/sclera: Conjunctivae normal.      Pupils: Pupils are equal, round, and reactive to light.   Cardiovascular:      Rate and Rhythm: Normal rate and regular rhythm.      Heart sounds: Normal heart sounds.   Pulmonary:      Effort: Pulmonary effort is normal.      Breath sounds: Normal breath sounds.   Abdominal:      General: Bowel sounds are normal. There is no distension.      Palpations: Abdomen is soft. There is no mass.      Tenderness: There is no abdominal tenderness.   Musculoskeletal:         General: Tenderness and signs of injury present.      Cervical back: Neck supple.   Skin:     General: Skin is warm.   Neurological:      General: No focal deficit present.      Mental Status: She is alert and oriented to person, place, and time.      Gait: Gait abnormal.   Psychiatric:         Mood and Affect: Mood normal.         Behavior: Behavior normal.       PHQ-9 Total Score:      Assessment & Plan   Diagnoses and all orders for this visit:    1. Encounter for screening  examination for chlamydial infection (Primary)  Comments:  Patient has been advised to wear sunscreen and seatbelt.  Orders:  -     Chlamydia trachomatis, PCR - Swab, Urine, Clean Catch; Future  -     Chlamydia trachomatis, PCR - Swab, Urine, Clean Catch    2. Vasovagal syncope  Comments:  No more passout's.    3. RUQ abdominal pain  Comments:  Right upper quadrant pain is gone.    4. Palpitations  Comments:  No recent palpitations.    5. Other hyperlipidemia  Comments:  Trying to eat less saturated fats    6. Gastroesophageal reflux disease, unspecified whether esophagitis present  Comments:  Heartburn is under good control.    7. Dizziness  Comments:  Dizziness has improved.    8. Depression, unspecified depression type  Comments:  No HI or SI mood is much improved we will follow-up in October with behavioral health and has already seen her counselor.  Would like Lexapro increased.    9. Mild intermittent asthma, unspecified whether complicated  Comments:  Not since childhood    10. Anxiety  Comments:  Feels as though anxiety is still controlling her life we will increase the Lexapro and consider other medications after she sees the counselor.    11. Closed fracture of middle phalanx of lesser toe of left foot, physeal involvement unspecified, initial encounter  Comments:  New injury when she tripped and sustained a probable fracture to her left fourth toe.  X-rays are pending she was instructed to william tape  Orders:  -     XR Foot 3+ View Left; Future    12. Possible pregnancy  Comments:  Patient is having unprotected sex does not feel she is probably pregnant but with medications and testing test was ordered.  Orders:  -     POC Pregnancy, Urine    13. Class 1 obesity due to excess calories with serious comorbidity and body mass index (BMI) of 31.0 to 31.9 in adult    Other orders  -     escitalopram (Lexapro) 10 MG tablet; Take 1 tablet by mouth Daily.  Dispense: 30 tablet; Refill: 3        Patient  Instructions     Health Maintenance Due   Topic Date Due   • COVID-19 Vaccine (1) Never done   • Pneumococcal Vaccine 0-64 (1 - PCV) 09/12/2004   • TDAP/TD VACCINES (2 - Td or Tdap) 09/14/2020   • CHLAMYDIA SCREENING  07/13/2021    Call 3 weeks to report progress.

## 2022-08-31 ENCOUNTER — TELEPHONE (OUTPATIENT)
Dept: FAMILY MEDICINE CLINIC | Facility: CLINIC | Age: 22
End: 2022-08-31

## 2022-08-31 LAB — C TRACH RRNA SPEC QL NAA+PROBE: NEGATIVE

## 2022-08-31 NOTE — TELEPHONE ENCOUNTER
HUB TO READ:  ----- Message from Anna Kenney MD sent at 8/30/2022  5:01 PM EDT -----  Patient does have a twisted fracture of the proximal bone in the fourth toe.  She can see the foot doctor if she is having significant discomfort in the meantime I would william tape the toe to the third toe and the fifth toe with padding in between let me know if referral to podiatry is to be written

## 2022-09-01 ENCOUNTER — TELEPHONE (OUTPATIENT)
Dept: FAMILY MEDICINE CLINIC | Facility: CLINIC | Age: 22
End: 2022-09-01

## 2022-09-01 NOTE — TELEPHONE ENCOUNTER
HUB TO READ   ----- Message from Anna Kenney MD sent at 9/1/2022  7:47 AM EDT -----  Chlamydia was negative.

## 2022-09-12 RX ORDER — ESCITALOPRAM OXALATE 5 MG/1
TABLET ORAL
Qty: 30 TABLET | Refills: 1 | OUTPATIENT
Start: 2022-09-12

## 2022-11-01 ENCOUNTER — TELEPHONE (OUTPATIENT)
Dept: FAMILY MEDICINE CLINIC | Facility: CLINIC | Age: 22
End: 2022-11-01

## 2022-11-01 NOTE — TELEPHONE ENCOUNTER
Caller: Gregory Baez    Relationship: Self    Best call back number: [phone]  What is the best time to reach you: ANYTIME IF NO ANSWER LVM.  Who are you requesting to speak with (clinical staff, provider,  specific staff member):CLINICAL STAFF  Do you know the name of the person who called: SELF      What was the call regarding: PATIENT CALLED AND STATED SHE WOULD LIKE TO BE REFERRED TO BEHAVIORAL HEALTH  [phone]. PLEASE GET ME IN AS SOON AS POSSIBLE. PATIENT WOULD LIKE TO TALK TO YOU REGARDING THE MEDICINE YOU PUT HER ON MAYBE YOU CAN SEND ME IN A HIGHER DOES OF THE MEDICATION LEXAPRO FOR MY ANXIETY. THANKS     Do you require a callback: YES

## 2022-11-06 NOTE — PATIENT INSTRUCTIONS
Health Maintenance Due   Topic Date Due    COVID-19 Vaccine (1) Never done    Pneumococcal Vaccine 0-64 (1 - PCV) 09/12/2004    TDAP/TD VACCINES (2 - Td or Tdap) 09/14/2020    INFLUENZA VACCINE  Never done    Patient to call if question or problems before 12/ recheck

## 2022-11-07 ENCOUNTER — OFFICE VISIT (OUTPATIENT)
Dept: FAMILY MEDICINE CLINIC | Facility: CLINIC | Age: 22
End: 2022-11-07

## 2022-11-07 VITALS
WEIGHT: 170.6 LBS | SYSTOLIC BLOOD PRESSURE: 115 MMHG | HEIGHT: 63 IN | BODY MASS INDEX: 30.23 KG/M2 | DIASTOLIC BLOOD PRESSURE: 76 MMHG | TEMPERATURE: 97.9 F

## 2022-11-07 DIAGNOSIS — F41.9 ANXIETY: ICD-10-CM

## 2022-11-07 DIAGNOSIS — R03.0 ELEVATED BP WITHOUT DIAGNOSIS OF HYPERTENSION: ICD-10-CM

## 2022-11-07 DIAGNOSIS — E66.09 CLASS 1 OBESITY DUE TO EXCESS CALORIES WITH SERIOUS COMORBIDITY AND BODY MASS INDEX (BMI) OF 30.0 TO 30.9 IN ADULT: ICD-10-CM

## 2022-11-07 DIAGNOSIS — F32.A DEPRESSION, UNSPECIFIED DEPRESSION TYPE: Primary | ICD-10-CM

## 2022-11-07 PROCEDURE — 99213 OFFICE O/P EST LOW 20 MIN: CPT | Performed by: PREVENTIVE MEDICINE

## 2022-11-07 RX ORDER — BUSPIRONE HYDROCHLORIDE 5 MG/1
5 TABLET ORAL 3 TIMES DAILY
Qty: 90 TABLET | Refills: 0 | Status: SHIPPED | OUTPATIENT
Start: 2022-11-07 | End: 2022-12-05

## 2022-11-07 NOTE — PROGRESS NOTES
"Subjective   Shawndomenic ROSA Baez is a 22 y.o. female presents for   Chief Complaint   Patient presents with   • Depression     Discuss medication adjustments   • Anxiety     Patient presents today for follow-up on depression and anxiety.  She is estranged from her family so has no help with her baby from her own family but she is still on good terms with her soon-to-be  's family.  He is going to help support the child she is moving to [place] and will follow-up with [facility].  Presently she does not feel homicidal or suicidal she is having some difficulty sleeping.  She will try to add some BuSpar to the Lexapro to see if that will help relax her enough to get through her day and to sleep at night.  She will call if her symptoms worsen  Health Maintenance Due   Topic Date Due   • COVID-19 Vaccine (1) Never done   • Pneumococcal Vaccine 0-64 (1 - PCV) 09/12/2004   • TDAP/TD VACCINES (2 - Td or Tdap) 09/14/2020   • INFLUENZA VACCINE  Never done       History of Present Illness     Vitals:    11/07/22 1556 11/07/22 1559   BP: 112/74 115/76   BP Location: Right arm Right arm   Patient Position: Sitting Sitting   Cuff Size: Adult Adult   Temp:  97.9 °F (36.6 °C)   Weight: 77.4 kg (170 lb 9.6 oz)    Height: 160 cm (62.99\")      Body mass index is 30.23 kg/m².    Current Outpatient Medications on File Prior to Visit   Medication Sig Dispense Refill   • escitalopram (Lexapro) 10 MG tablet Take 1 tablet by mouth Daily. 30 tablet 3     No current facility-administered medications on file prior to visit.       The following portions of the patient's history were reviewed and updated as appropriate: allergies, current medications, past family history, past medical history, past social history, past surgical history and problem list.    Review of Systems   Psychiatric/Behavioral: Positive for sleep disturbance and depressed mood. The patient is nervous/anxious.        Objective   Physical " Exam  Vitals reviewed.   Constitutional:       General: She is not in acute distress.     Appearance: She is well-developed. She is obese. She is not ill-appearing or toxic-appearing.   HENT:      Head: Normocephalic and atraumatic.      Nose: Nose normal.   Eyes:      Extraocular Movements: Extraocular movements intact.      Conjunctiva/sclera: Conjunctivae normal.      Pupils: Pupils are equal, round, and reactive to light.   Cardiovascular:      Rate and Rhythm: Normal rate.   Pulmonary:      Effort: Pulmonary effort is normal.   Abdominal:      General: There is no distension.      Palpations: There is no mass.      Tenderness: There is no abdominal tenderness.   Neurological:      Mental Status: She is alert and oriented to person, place, and time.   Psychiatric:         Mood and Affect: Mood normal.         Behavior: Behavior normal.       PHQ-9 Total Score:      Assessment & Plan   Diagnoses and all orders for this visit:    1. Depression, unspecified depression type (Primary)  Comments:  Patient and her  have decided to divorce.  She is moving to [place] at the child that she has today and will follow-up with her depression and anx    2. Anxiety  Comments:  Anxiety does seem to cause her problems with sleep.  She will try some BuSpar up to 3 times a day 5 mg and keep follow-up with [facility].  Orders:  -     Ambulatory Referral to Behavioral Health    3. Class 1 obesity due to excess calories with serious comorbidity and body mass index (BMI) of 30.0 to 30.9 in adult    4. Elevated BP without diagnosis of hypertension  Comments:  Blood pressures under good control today.    Other orders  -     Cancel: FluLaval/Fluzone >6 mos (3096-3588)  -     Cancel: Pneumococcal Conjugate Vaccine 20-Valent (PCV20)  -     busPIRone (BUSPAR) 5 MG tablet; Take 1 tablet by mouth 3 (Three) Times a Day.  Dispense: 90 tablet; Refill: 0        Patient Instructions     Health Maintenance Due   Topic Date Due   •  COVID-19 Vaccine (1) Never done   • Pneumococcal Vaccine 0-64 (1 - PCV) 09/12/2004   • TDAP/TD VACCINES (2 - Td or Tdap) 09/14/2020   • INFLUENZA VACCINE  Never done    Patient to call if question or problems before 12/ recheck

## 2022-12-04 NOTE — PATIENT INSTRUCTIONS
Health Maintenance Due   Topic Date Due    COVID-19 Vaccine (1) Never done    Pneumococcal Vaccine 0-64 (1 - PCV) 09/12/2004    TDAP/TD VACCINES (2 - Td or Tdap) 09/14/2020    INFLUENZA VACCINE  Never done    12 hour  fast for labs

## 2022-12-04 NOTE — TELEPHONE ENCOUNTER
Rx Refill Note  Requested Prescriptions     Pending Prescriptions Disp Refills   • busPIRone (BUSPAR) 5 MG tablet [Pharmacy Med Name: BUSPIRONE HCL 5 MG TABLET] 90 tablet 0     Sig: TAKE 1 TABLET BY MOUTH THREE TIMES A DAY      Last office visit with prescribing clinician: 11/7/2022      Next office visit with prescribing clinician: 12/5/2022   3}  Barby Medina  12/04/22, 18:58 EST

## 2022-12-05 ENCOUNTER — OFFICE VISIT (OUTPATIENT)
Dept: FAMILY MEDICINE CLINIC | Facility: CLINIC | Age: 22
End: 2022-12-05

## 2022-12-05 VITALS
WEIGHT: 171.4 LBS | DIASTOLIC BLOOD PRESSURE: 78 MMHG | HEIGHT: 63 IN | BODY MASS INDEX: 30.37 KG/M2 | TEMPERATURE: 98.6 F | SYSTOLIC BLOOD PRESSURE: 116 MMHG | HEART RATE: 93 BPM | OXYGEN SATURATION: 98 %

## 2022-12-05 DIAGNOSIS — R10.11 RUQ ABDOMINAL PAIN: ICD-10-CM

## 2022-12-05 DIAGNOSIS — R55 VASOVAGAL SYNCOPE: Primary | ICD-10-CM

## 2022-12-05 DIAGNOSIS — R00.2 PALPITATIONS: ICD-10-CM

## 2022-12-05 DIAGNOSIS — R87.612 LOW GRADE SQUAMOUS INTRAEPITHELIAL LESION ON CYTOLOGIC SMEAR OF CERVIX (LGSIL): ICD-10-CM

## 2022-12-05 DIAGNOSIS — E66.09 CLASS 1 OBESITY DUE TO EXCESS CALORIES WITH SERIOUS COMORBIDITY AND BODY MASS INDEX (BMI) OF 30.0 TO 30.9 IN ADULT: ICD-10-CM

## 2022-12-05 DIAGNOSIS — R19.8 ABNORMAL BOWEL HABITS: ICD-10-CM

## 2022-12-05 DIAGNOSIS — K21.9 GASTROESOPHAGEAL REFLUX DISEASE, UNSPECIFIED WHETHER ESOPHAGITIS PRESENT: ICD-10-CM

## 2022-12-05 DIAGNOSIS — E55.9 VITAMIN D DEFICIENCY: ICD-10-CM

## 2022-12-05 DIAGNOSIS — F32.A DEPRESSION, UNSPECIFIED DEPRESSION TYPE: ICD-10-CM

## 2022-12-05 DIAGNOSIS — E78.49 OTHER HYPERLIPIDEMIA: ICD-10-CM

## 2022-12-05 DIAGNOSIS — R03.0 ELEVATED BP WITHOUT DIAGNOSIS OF HYPERTENSION: ICD-10-CM

## 2022-12-05 PROCEDURE — 90471 IMMUNIZATION ADMIN: CPT | Performed by: PREVENTIVE MEDICINE

## 2022-12-05 PROCEDURE — 90686 IIV4 VACC NO PRSV 0.5 ML IM: CPT | Performed by: PREVENTIVE MEDICINE

## 2022-12-05 PROCEDURE — 99214 OFFICE O/P EST MOD 30 MIN: CPT | Performed by: PREVENTIVE MEDICINE

## 2022-12-05 RX ORDER — POLYETHYLENE GLYCOL 3350 17 G/17G
17 POWDER, FOR SOLUTION ORAL DAILY
Qty: 3350 G | Refills: 1 | Status: SHIPPED | OUTPATIENT
Start: 2022-12-05

## 2022-12-05 RX ORDER — BUSPIRONE HYDROCHLORIDE 5 MG/1
5 TABLET ORAL 3 TIMES DAILY
Qty: 90 TABLET | Refills: 3 | Status: SHIPPED | OUTPATIENT
Start: 2022-12-05

## 2022-12-05 RX ORDER — ADHESIVE BANDAGE 3/4"
1 BANDAGE TOPICAL 2 TIMES DAILY
Qty: 1 EACH | Refills: 0 | Status: SHIPPED | OUTPATIENT
Start: 2022-12-05

## 2022-12-05 RX ORDER — ESCITALOPRAM OXALATE 20 MG/1
20 TABLET ORAL DAILY
Qty: 90 TABLET | Refills: 1 | Status: SHIPPED | OUTPATIENT
Start: 2022-12-05

## 2022-12-05 RX ORDER — BUSPIRONE HYDROCHLORIDE 5 MG/1
TABLET ORAL
Qty: 90 TABLET | Refills: 0 | Status: SHIPPED | OUTPATIENT
Start: 2022-12-05 | End: 2022-12-05

## 2022-12-05 NOTE — PROGRESS NOTES
"Subjective   Gregory Baez is a 22 y.o. female presents for   Chief Complaint   Patient presents with   • Follow-up     3 month/ meds    Patient presents today for follow-up on multiple chronic health conditions most of which are stable she did have an episode of lightheadedness while driving thought she might have to pull off the road but after a few minutes was able to drive without any more lightheadedness.  She will home monitor her blood pressure and let us know if this reoccurs.  Patient's depression is under good control but she feels sad at times no HI or SI but would like to increase her Lexapro to 20.  Finally she feels as though at times she needs the BuSpar 3 times a day I advised that it is prescribed 3 times a day for her so she will keep us posted as to whether or not this is controlling things we did call the behavioral health people and they have her on the list to be seen in early January.  She did get and get her Pap smear done and we have started her on some constipation medicine.    Health Maintenance Due   Topic Date Due   • COVID-19 Vaccine (1) Never done   • Pneumococcal Vaccine 0-64 (1 - PCV) 09/12/2004   • TDAP/TD VACCINES (2 - Td or Tdap) 09/14/2020       History of Present Illness     Vitals:    12/05/22 0943 12/05/22 0946   BP: 115/76 116/78   BP Location: Left arm Right arm   Pulse: 93    Temp: 98.6 °F (37 °C)    SpO2: 98%    Weight: 77.7 kg (171 lb 6.4 oz)    Height: 160 cm (62.99\")      Body mass index is 30.37 kg/m².    Current Outpatient Medications on File Prior to Visit   Medication Sig Dispense Refill   • [DISCONTINUED] busPIRone (BUSPAR) 5 MG tablet TAKE 1 TABLET BY MOUTH THREE TIMES A DAY 90 tablet 0   • [DISCONTINUED] escitalopram (Lexapro) 10 MG tablet Take 1 tablet by mouth Daily. 30 tablet 3   • [DISCONTINUED] busPIRone (BUSPAR) 5 MG tablet Take 1 tablet by mouth 3 (Three) Times a Day. 90 tablet 0     No current facility-administered medications on file prior to visit. "       The following portions of the patient's history were reviewed and updated as appropriate: allergies, current medications, past family history, past medical history, past social history, past surgical history and problem list.    Review of Systems   Neurological: Positive for light-headedness.   Psychiatric/Behavioral: Positive for depressed mood.       Objective   Physical Exam  Vitals reviewed.   Constitutional:       General: She is not in acute distress.     Appearance: She is well-developed. She is obese. She is not ill-appearing or toxic-appearing.   HENT:      Head: Normocephalic and atraumatic.      Right Ear: Tympanic membrane, ear canal and external ear normal.      Left Ear: Tympanic membrane, ear canal and external ear normal.      Nose: Nose normal.      Mouth/Throat:      Mouth: Mucous membranes are moist.      Pharynx: No posterior oropharyngeal erythema.   Eyes:      Extraocular Movements: Extraocular movements intact.      Conjunctiva/sclera: Conjunctivae normal.      Pupils: Pupils are equal, round, and reactive to light.   Cardiovascular:      Rate and Rhythm: Normal rate and regular rhythm.      Heart sounds: Normal heart sounds.   Pulmonary:      Effort: Pulmonary effort is normal.      Breath sounds: Normal breath sounds.   Abdominal:      General: Bowel sounds are normal. There is no distension.      Palpations: Abdomen is soft. There is no mass.      Tenderness: There is no abdominal tenderness.   Musculoskeletal:      Cervical back: Neck supple.   Skin:     General: Skin is warm.   Neurological:      General: No focal deficit present.      Mental Status: She is alert and oriented to person, place, and time.   Psychiatric:         Mood and Affect: Mood normal.         Behavior: Behavior normal.       PHQ-9 Total Score:      Assessment & Plan   Diagnoses and all orders for this visit:    1. Vasovagal syncope (Primary)  Comments:  No more passouts but one episode thought might have to pull  over-but passed 10 minutes-felt head heavy-hectic day at work.  BP cuff    2. RUQ abdominal pain  Comments:  Controlled with TUms    3. Palpitations  Comments:  controlled with anxiety meds  Orders:  -     CBC Auto Differential; Future  -     Magnesium; Future  -     TSH; Future    4. Depression, unspecified depression type  Comments:  No HI or SI-will increase Lexapro  Orders:  -     Vitamin B12; Future    5. Gastroesophageal reflux disease, unspecified whether esophagitis present  Comments:  Heartburn seems to be under fair control.  Orders:  -     Magnesium; Future    6. Other hyperlipidemia  Comments:  Trying to eat less sat fats  Orders:  -     Comprehensive Metabolic Panel; Future  -     Lipid Panel; Future    7. Low grade squamous intraepithelial lesion on cytologic smear of cervix (LGSIL)  Comments:  Just done    8. Abnormal bowel habits  Comments:  Constipation is still an issue we again discussed MiraLAX and had given her prescription for some of the medication    9. Elevated BP without diagnosis of hypertension  Comments:  Controlled    10. Vitamin D deficiency  Comments:  Trys to remember every morning  Orders:  -     Vitamin D,25-Hydroxy; Future    11. Class 1 obesity due to excess calories with serious comorbidity and body mass index (BMI) of 30.0 to 30.9 in adult    Other orders  -     FluLaval/Fluzone >6 mos (0305-4912)  -     Blood Pressure Monitoring (Blood Pressure Cuff) misc; 1 each 2 (Two) Times a Day.  Dispense: 1 each; Refill: 0  -     escitalopram (Lexapro) 20 MG tablet; Take 1 tablet by mouth Daily.  Dispense: 90 tablet; Refill: 1  -     busPIRone (BUSPAR) 5 MG tablet; Take 1 tablet by mouth 3 (Three) Times a Day.  Dispense: 90 tablet; Refill: 3  -     polyethylene glycol (GlycoLax) 17 GM/SCOOP powder; Take 17 g by mouth Daily.  Dispense: 3350 g; Refill: 1        Patient Instructions     Health Maintenance Due   Topic Date Due   • COVID-19 Vaccine (1) Never done   • Pneumococcal Vaccine 0-64  (1 - PCV) 09/12/2004   • TDAP/TD VACCINES (2 - Td or Tdap) 09/14/2020   • INFLUENZA VACCINE  Never done    12 hour  fast for labs

## 2022-12-31 RX ORDER — ESCITALOPRAM OXALATE 10 MG/1
TABLET ORAL
Qty: 30 TABLET | Refills: 3 | OUTPATIENT
Start: 2022-12-31

## 2023-01-03 NOTE — PROGRESS NOTES
Subjective   Gregory Baez is a 22 y.o. female who presents today for initial evaluation and anxiety and depression.     Chief Complaint:  \"Dr. Kenney sent me here to see if something would help my symptoms better.\"    History of Present Illness:     Current medications: Lexapro 20mg, buspirone 5mg 3 times a day. Patient sees Dr. Kenney for primary care.     Patient states that Lexapro helps her depression, but feels like its making anxiety is worse.   She is taking buspirone sometimes 1-2 times a day, and sometimes not at all. She has only been taking it when she feels anxiety coming on, but not taking it consistently.   She says she can't stay focused when she has anxiety.   She is getting agitated at little things. States her lip is starting to twitch. These symptoms started about a month ago.   She has a son, he is 1. She feels like she gets frustrated and impatient with him over minor things and she doesn't want to feel that way towards him.   She lives with son and son's father.   Her son's father doesn't feel like the lexapro is helping her anxiety.   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 About 4pm she feels like her anxiety kicks in.   She picks at skin when she is anxious.   Family history: Mom was bipolar and schizophrenic. She passed away in September.   Her father had anger problems.   Doesn't talk to any of her family now.   Used marijuana in the past.   No hospitalizations in the past.   No suicide attempts.  Suicidal ideation when she was having postpartum depression. Did not try any medication at that time.   No suicidal ideation now.   Lived with aunt from age 10.   Patient completed mood disorder questionnaire and checked yes to 11 out of 17 item. Likely points to a bipolar disorder. She does endorse hypomania in the past, with no true manic episode.     Past medical history: GERD, depression, anxiety, hyperlipidemia, chronic constipation, vitamin D deficiency  Past  psychiatric history: depression, anxiety   Family history: mom's sister had schizophrenia, mom had bipolar and schizophrenia, 4 brothers--3 of them should be seen for some type of disorder, but don't get seen.   Social history: Patient is not a smoker, does not currently vape, does not report any alcohol use and does not report any recreational drug use.     Patient presents with symptoms and behaviors that are consistent with the following DSM-5 diagnoses:  1. Bipolar II disorder  2. Generalized anxiety disorder    The following portions of the patient's history were reviewed and updated as appropriate: allergies, current medications, past family history, past medical history, past social history, past surgical history and problem list.    PAST OUTPATIENT TREATMENT  Diagnosis treated:  Affective Disorder, Anxiety/Panic Disorder  Treatment Type:  Medication Management  Prior Psychiatric Medications:  Lexapro  Buspirone  Prozac-didn't work  Zoloft-made her like a zombie, didn't care about anything  Support Groups:  None  Sequelae Of Mental Disorder:  job disruption, social isolation, family disruption, emotional distress    Interval History  N/A, new patient     Side Effects  None       Past Medical History:  Past Medical History:   Diagnosis Date   • Abnormal bowel habits    • Anxiety    • Asthma     childhood.   • Depression    • Dizziness    • GERD (gastroesophageal reflux disease)        Social History:  Social History     Socioeconomic History   • Marital status: Single   Tobacco Use   • Smoking status: Former     Packs/day: 0.25     Years: 1.00     Pack years: 0.25     Types: Cigarettes     Start date: 2019     Quit date: 2020     Years since quittin.0   • Smokeless tobacco: Never   • Tobacco comments:     weaning \"hasn't had cigarette in 3 days\"    Vaping Use   • Vaping Use: Never used   Substance and Sexual Activity   • Alcohol use: Not Currently     Alcohol/week: 8.0 standard drinks     Types:  4 Cans of beer, 4 Shots of liquor per week     Comment: socially   • Drug use: Not Currently     Frequency: 4.0 times per week     Types: Marijuana     Comment: occasionally   • Sexual activity: Yes     Partners: Male     Birth control/protection: Condom, None       Family History:  Family History   Problem Relation Age of Onset   • Hypertension Father    • Diabetes Father    • Hyperlipidemia Father    • Alcohol abuse Father    • Hypertension Maternal Grandmother    • Hyperlipidemia Maternal Grandmother    • Stroke Maternal Grandmother    • Hypertension Paternal Grandmother    • Diabetes Paternal Grandmother    • Hyperlipidemia Paternal Grandmother    • Heart disease Paternal Grandmother    • Cancer Paternal Grandmother    • Hypertension Paternal Grandfather    • Diabetes Paternal Grandfather    • Hyperlipidemia Paternal Grandfather    • Heart disease Paternal Grandfather    • Stroke Paternal Grandfather    • Alcohol abuse Maternal Grandfather    • Stroke Maternal Grandfather        Past Surgical History:  Past Surgical History:   Procedure Laterality Date   • ADENOIDECTOMY     • COLONOSCOPY N/A 12/3/2020    Procedure: COLONOSCOPY with random colon biopsy;  Surgeon: Bear Caraballo MD;  Location: [facility];  Service: Gastroenterology;  Laterality: N/A;  Post op: normal colon, random comon biopsy   • DENTAL PROCEDURE     • ENDOSCOPY N/A 12/3/2020    Procedure: ESOPHAGOGASTRODUODENOSCOPY with biopsy x2;  Surgeon: Bear Caraballo MD;  Location: [facility];  Service: Gastroenterology;  Laterality: N/A;  post op: duodenitis, gastritis, biopsy x2   • MYRINGOTOMY W/ TUBES     • TONSILLECTOMY         Problem List:  Patient Active Problem List   Diagnosis   • Vasovagal syncope   • RUQ abdominal pain   • Palpitations   • Depression   • Anxiety   • GERD (gastroesophageal reflux disease)   • Dizziness   • Other hyperlipidemia   • Low grade squamous intraepithelial lesion on cytologic smear of cervix (LGSIL)   •  Abnormal bowel habits   • Elevated BP without diagnosis of hypertension   •  (normal spontaneous vaginal delivery)   • Obesity   • Class 1 obesity due to excess calories with serious comorbidity and body mass index (BMI) of 30.0 to 30.9 in adult       Allergy:   Allergies   Allergen Reactions   • Penicillins Hives and Nausea And Vomiting        Discontinued Medications:  There are no discontinued medications.    Current Medications:   Current Outpatient Medications   Medication Sig Dispense Refill   • Blood Pressure Monitoring (Blood Pressure Cuff) misc 1 each 2 (Two) Times a Day. 1 each 0   • busPIRone (BUSPAR) 5 MG tablet Take 1 tablet by mouth 3 (Three) Times a Day. 90 tablet 3   • escitalopram (Lexapro) 20 MG tablet Take 1 tablet by mouth Daily. 90 tablet 1   • polyethylene glycol (GlycoLax) 17 GM/SCOOP powder Take 17 g by mouth Daily. 3350 g 1   • Cariprazine HCl (Vraylar) 1.5 MG capsule capsule Take 1 capsule by mouth Daily. 30 capsule 1     No current facility-administered medications for this visit.         Psychological ROS: positive for - anxiety, concentration difficulties, depression, irritability, mood swings and sleep disturbances  negative for - behavioral disorder, decreased libido, disorientation, hallucinations, hostility, memory difficulties, obsessive thoughts, physical abuse, sexual abuse or suicidal ideation      Physical Exam:   Blood pressure 102/80, pulse 76, height 162.6 cm (64\"), weight 80.7 kg (178 lb), SpO2 99 %, not currently breastfeeding.    Mental Status Exam:   Hygiene:   good  Cooperation:  Cooperative  Eye Contact:  Good  Psychomotor Behavior:  Appropriate  Affect:  Appropriate  Mood: anxious  Hopelessness: Denies  Speech:  Normal  Thought Process:  Goal directed and Linear  Thought Content:  Normal  Suicidal:  None  Homicidal:  None  Hallucinations:  None  Delusion:  None  Memory:  Intact  Orientation:  Person, Place, Time and Situation  Reliability:  good  Insight:   Good  Judgement:  Good  Impulse Control:  Good  Physical/Medical Issues:  No        PHQ-9 Depression Screening    Little interest or pleasure in doing things? 2-->more than half the days   Feeling down, depressed, or hopeless? 1-->several days   Trouble falling or staying asleep, or sleeping too much? 2-->more than half the days   Feeling tired or having little energy? 2-->more than half the days   Poor appetite or overeating? 0-->not at all   Feeling bad about yourself - or that you are a failure or have let yourself or your family down? 3-->nearly every day   Trouble concentrating on things, such as reading the newspaper or watching television? 2-->more than half the days   Moving or speaking so slowly that other people could have noticed? Or the opposite - being so fidgety or restless that you have been moving around a lot more than usual? 0-->not at all   Thoughts that you would be better off dead, or of hurting yourself in some way? 1-->several days   PHQ-9 Total Score 13   If you checked off any problems, how difficult have these problems made it for you to do your work, take care of things at home, or get along with other people? very difficult        Former smoker    I advised Gregory of the risks of tobacco use.     Result Review:    Labs:  No visits with results within 3 Month(s) from this visit.   Latest known visit with results is:   Office Visit on 08/30/2022   Component Date Value Ref Range Status   • HCG, Urine, QL 08/30/2022 Negative  Negative Final   • Lot Number 08/30/2022 [id number]   Final   • Internal Positive Control 08/30/2022 Positive  Positive, Passed Final   • Internal Negative Control 08/30/2022 Negative  Negative, Passed Final   • Expiration Date 08/30/2022 05/31/2023   Final   • Chlamydia trachomatis, KAVITA 08/30/2022 Negative  Negative Final       Assessment & Plan   Diagnoses and all orders for this visit:    1. Bipolar II disorder (HCC) (Primary)  -     Cariprazine HCl (Vraylar) 1.5 MG  capsule capsule; Take 1 capsule by mouth Daily.  Dispense: 30 capsule; Refill: 1    2. Generalized anxiety disorder    Continue lexapro 20mg daily. Refill not needed today.   Continue buspirone 5mg twice a day.   Start vraylar 1.5mg daily. Samples given for 2 weeks.     Visit Diagnoses:    ICD-10-CM ICD-9-CM   1. Bipolar II disorder (HCC)  F31.81 296.89   2. Generalized anxiety disorder  F41.1 300.02       TREATMENT PLAN/GOALS: Continue supportive psychotherapy efforts and medications as indicated. Treatment and medication options discussed during today's visit. Patient ackowledged and verbally consented to continue with current treatment plan and was educated on the importance of compliance with treatment and follow-up appointments.    MEDICATION ISSUES:  INSPECT reviewed as expected    Discussed medication options and treatment plan of prescribed medication as well as the risks, benefits, and side effects including potential falls, possible impaired driving and metabolic adversities among others. Patient is agreeable to call the office with any worsening of symptoms or onset of side effects. Patient is agreeable to call 911 or go to the nearest ER should he/she begin having SI/HI. No medication side effects or related complaints today.     MEDS ORDERED DURING VISIT:  New Medications Ordered This Visit   Medications   • Cariprazine HCl (Vraylar) 1.5 MG capsule capsule     Sig: Take 1 capsule by mouth Daily.     Dispense:  30 capsule     Refill:  1       Return in about 2 months (around 3/4/2023).         This document has been electronically signed by LIMA Kinsey  January 4, 2023 14:47 EST    Part of this note may be an electronic transcription/translation of spoken language to printed text using the Dragon Dictation System.

## 2023-01-04 ENCOUNTER — OFFICE VISIT (OUTPATIENT)
Dept: PSYCHIATRY | Facility: CLINIC | Age: 23
End: 2023-01-04
Payer: COMMERCIAL

## 2023-01-04 VITALS
OXYGEN SATURATION: 99 % | WEIGHT: 178 LBS | DIASTOLIC BLOOD PRESSURE: 80 MMHG | HEART RATE: 76 BPM | HEIGHT: 64 IN | SYSTOLIC BLOOD PRESSURE: 102 MMHG | BODY MASS INDEX: 30.39 KG/M2

## 2023-01-04 DIAGNOSIS — F41.1 GENERALIZED ANXIETY DISORDER: Chronic | ICD-10-CM

## 2023-01-04 DIAGNOSIS — F31.81 BIPOLAR II DISORDER: Primary | Chronic | ICD-10-CM

## 2023-01-04 PROCEDURE — 90792 PSYCH DIAG EVAL W/MED SRVCS: CPT

## 2023-01-05 ENCOUNTER — PRIOR AUTHORIZATION (OUTPATIENT)
Dept: PSYCHIATRY | Facility: CLINIC | Age: 23
End: 2023-01-05
Payer: COMMERCIAL

## 2023-01-05 NOTE — TELEPHONE ENCOUNTER
PA for Vraylar 1.5 mg sent        1-10-22 - Message from Plan  Approved. This drug is covered on the Preferred Drug List. It does not require prior approval. In addition, members will be limited to an initial supply of 15 days for atypical antipsychotics Please call the pharmacy to process the claim.

## 2023-01-06 ENCOUNTER — PATIENT ROUNDING (BHMG ONLY) (OUTPATIENT)
Dept: PSYCHIATRY | Facility: CLINIC | Age: 23
End: 2023-01-06
Payer: COMMERCIAL

## 2023-01-06 ENCOUNTER — PATIENT MESSAGE (OUTPATIENT)
Dept: PSYCHIATRY | Facility: CLINIC | Age: 23
End: 2023-01-06
Payer: COMMERCIAL

## 2023-01-06 NOTE — PROGRESS NOTES
A My-Chart message has been sent to the patient for PATIENT ROUNDING with [facility] [place], [place]

## 2023-01-10 ENCOUNTER — TELEPHONE (OUTPATIENT)
Dept: PSYCHIATRY | Facility: CLINIC | Age: 23
End: 2023-01-10
Payer: COMMERCIAL

## 2023-01-10 NOTE — TELEPHONE ENCOUNTER
Informed patient that her insurance approved the Vraylar and that the prior authorization provider notice had been faxed to [facility] in [place].  Patient v/u.
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
## 2023-02-08 ENCOUNTER — OFFICE VISIT (OUTPATIENT)
Dept: PSYCHIATRY | Facility: CLINIC | Age: 23
End: 2023-02-08
Payer: COMMERCIAL

## 2023-02-08 DIAGNOSIS — F41.9 ANXIETY: ICD-10-CM

## 2023-02-08 DIAGNOSIS — F33.1 MODERATE EPISODE OF RECURRENT MAJOR DEPRESSIVE DISORDER: ICD-10-CM

## 2023-02-08 DIAGNOSIS — F41.1 GENERALIZED ANXIETY DISORDER: Primary | ICD-10-CM

## 2023-02-08 PROCEDURE — 90791 PSYCH DIAGNOSTIC EVALUATION: CPT | Performed by: SOCIAL WORKER

## 2023-03-06 ENCOUNTER — OFFICE VISIT (OUTPATIENT)
Dept: PSYCHIATRY | Facility: CLINIC | Age: 23
End: 2023-03-06
Payer: COMMERCIAL

## 2023-03-06 DIAGNOSIS — F41.1 GENERALIZED ANXIETY DISORDER: ICD-10-CM

## 2023-03-06 DIAGNOSIS — F33.1 MODERATE EPISODE OF RECURRENT MAJOR DEPRESSIVE DISORDER: Primary | ICD-10-CM

## 2023-03-06 PROCEDURE — 90837 PSYTX W PT 60 MINUTES: CPT | Performed by: SOCIAL WORKER

## 2023-03-06 NOTE — PROGRESS NOTES
"Date: March 6, 2023  Time In: 0802  Time Out: 0900      PROGRESS NOTE  Data:  Gregory Baez is a 22 y.o. female who presents today for individual therapy session at [facility] with UMESH Steven, ERIC.     Patient Chief Complaint: follow up depression and anxiety    Clinical Maneuvering/Intervention: Gregory is pleasant, alert and oriented to person place and time.  She feels like she is doing much better since being on medication she has less episodes of lashing out or being what she refers to as \"dramatic\".  She has identified triggers for her anxiety as being not having enough sleep, driving, and her son being outside.  We discussed how to decrease anxiety with each of these triggers.  We also talked about parenting from an authoritative point of view, and suggestions included giving him simple commands versus asking him to do things and followed by positive reinforcement.     Assisted patient in processing above session content; acknowledged and normalized patient’s thoughts, feelings, and concerns. Rationalized patient thought process regarding anxiety triggers them.. Discussed triggers associated with patient's anxiety. Also discussed coping skills for patient to implement such as continuing with deep breathing and adding guided imagery.    Allowed patient to freely discuss issues without interruption or judgment. Provided safe, confidential environment to facilitate the development of positive therapeutic relationship and encourage open, honest communication. Assisted patient in identifying risk factors which would indicate the need for higher level of care including thoughts to harm self or others and/or self-harming behavior and encouraged patient to contact this office, call 911, or present to the nearest emergency room should any of these events occur. Discussed crisis intervention services and means to access. Patient adamantly and convincingly denies current suicidal or " homicidal ideation or perceptual disturbance.    Assessment   Patient appears to maintain relative stability as compared to their baseline. However, patient continues to struggle with depression and anxiety which continues to cause impairment in important areas of functioning. A result, they can be reasonably expected to continue to benefit from treatment and would likely be at increased risk for decompensation otherwise.    Mental Status Exam:   Hygiene:   good  Cooperation:  Cooperative  Eye Contact:  Good  Psychomotor Behavior:  Appropriate  Affect:  Appropriate  Mood: normal  Speech:  Normal  Thought Process:  Goal directed and Linear  Thought Content:  Normal  Suicidal:  None  Homicidal:  None  Hallucinations:  None  Delusion:  None  Memory:  Intact  Orientation:  Person, Place, Time and Situation  Reliability:  good  Insight:  Good  Judgement:  Good  Impulse Control:  Good  Physical/Medical Issues:  See diagnosis list     PHQ-Score Total:  PHQ-9 Total Score: PHQ-9 Depression Screening  Little interest or pleasure in doing things? 0-->not at all   Feeling down, depressed, or hopeless? 0-->not at all   Trouble falling or staying asleep, or sleeping too much? 1-->several days   Feeling tired or having little energy? 0-->not at all   Poor appetite or overeating? 0-->not at all   Feeling bad about yourself - or that you are a failure or have let yourself or your family down? 0-->not at all   Trouble concentrating on things, such as reading the newspaper or watching television? 1-->several days   Moving or speaking so slowly that other people could have noticed? Or the opposite - being so fidgety or restless that you have been moving around a lot more than usual? 0-->not at all   Thoughts that you would be better off dead, or of hurting yourself in some way? 0-->not at all   PHQ-9 Total Score 2   If you checked off any problems, how difficult have these problems made it for you to do your work, take care of things  at home, or get along with other people?        MAAME-7 Total Score:   Over the last two weeks, how often have you been bothered by the following problems?  Feeling nervous, anxious or on edge: Several days  Not being able to stop or control worrying: Several days  Worrying too much about different things: More than half the days  Trouble Relaxing: Not at all  Being so restless that it is hard to sit still: Not at all  Becoming easily annoyed or irritable: Not at all  Feeling afraid as if something awful might happen: Several days  MAAME 7 Total Score: 5     Patient's Support Network Includes:  significant other    Functional Status: Moderate impairment     Progress toward goal: Not at goal    Prognosis: Good with Ongoing Treatment          Plan     Resources: Patient was provided with the following community resources: None at this time    Patient will continue in individual outpatient therapy with focus on improved functioning and coping skills, maintaining stability, and avoiding decompensation and the need for higher level of care.    Patient will adhere to any medication regimens as prescribed and report any side effects. Patient will contact this office, call 911 or present to the nearest emergency room should suicidal or homicidal ideations occur. Provide cognitive behavioral therapy and solution focused therapy to improve functioning, maintain stability and avoid decompensation and the need for higher level of care.     Return in about 4 weeks, or earlier if symptoms worsen or fail to improve.           VISIT DIAGNOSIS:     ICD-10-CM ICD-9-CM   1. Moderate episode of recurrent major depressive disorder (HCC)  F33.1 296.32   2. Generalized anxiety disorder  F41.1 300.02            This document has been electronically signed by UMESH Steven, ERIC   March 6, 2023 11:42 EST      Part of this note may be an electronic transcription/translation of spoken language to printed text using the Dragon Dictation  System.

## 2023-03-25 DIAGNOSIS — F31.81 BIPOLAR II DISORDER: Chronic | ICD-10-CM

## 2023-03-28 RX ORDER — CARIPRAZINE 1.5 MG/1
CAPSULE, GELATIN COATED ORAL
Qty: 15 CAPSULE | Refills: 3 | Status: SHIPPED | OUTPATIENT
Start: 2023-03-28

## 2023-04-04 ENCOUNTER — OFFICE VISIT (OUTPATIENT)
Dept: FAMILY MEDICINE CLINIC | Facility: CLINIC | Age: 23
End: 2023-04-04
Payer: MEDICAID

## 2023-04-04 VITALS
HEIGHT: 64 IN | SYSTOLIC BLOOD PRESSURE: 128 MMHG | HEART RATE: 75 BPM | OXYGEN SATURATION: 97 % | BODY MASS INDEX: 31.65 KG/M2 | WEIGHT: 185.4 LBS | TEMPERATURE: 98 F | DIASTOLIC BLOOD PRESSURE: 88 MMHG

## 2023-04-04 DIAGNOSIS — E66.09 CLASS 1 OBESITY DUE TO EXCESS CALORIES WITH SERIOUS COMORBIDITY AND BODY MASS INDEX (BMI) OF 31.0 TO 31.9 IN ADULT: ICD-10-CM

## 2023-04-04 DIAGNOSIS — F32.A DEPRESSION, UNSPECIFIED DEPRESSION TYPE: ICD-10-CM

## 2023-04-04 DIAGNOSIS — R03.0 ELEVATED BP WITHOUT DIAGNOSIS OF HYPERTENSION: ICD-10-CM

## 2023-04-04 DIAGNOSIS — R87.612 LOW GRADE SQUAMOUS INTRAEPITHELIAL LESION ON CYTOLOGIC SMEAR OF CERVIX (LGSIL): ICD-10-CM

## 2023-04-04 DIAGNOSIS — E78.49 OTHER HYPERLIPIDEMIA: ICD-10-CM

## 2023-04-04 DIAGNOSIS — F33.1 MODERATE EPISODE OF RECURRENT MAJOR DEPRESSIVE DISORDER: ICD-10-CM

## 2023-04-04 DIAGNOSIS — R55 VASOVAGAL SYNCOPE: Primary | ICD-10-CM

## 2023-04-04 DIAGNOSIS — R10.11 RUQ ABDOMINAL PAIN: ICD-10-CM

## 2023-04-04 DIAGNOSIS — R19.8 ABNORMAL BOWEL HABITS: ICD-10-CM

## 2023-04-04 DIAGNOSIS — R00.2 PALPITATIONS: ICD-10-CM

## 2023-04-04 DIAGNOSIS — K21.9 GASTROESOPHAGEAL REFLUX DISEASE, UNSPECIFIED WHETHER ESOPHAGITIS PRESENT: ICD-10-CM

## 2023-04-04 PROCEDURE — 99214 OFFICE O/P EST MOD 30 MIN: CPT | Performed by: PREVENTIVE MEDICINE

## 2023-04-04 RX ORDER — SEMAGLUTIDE 1.34 MG/ML
0.25 INJECTION, SOLUTION SUBCUTANEOUS WEEKLY
Qty: 1.5 ML | Refills: 0 | Status: SHIPPED | OUTPATIENT
Start: 2023-04-04

## 2023-04-04 RX ORDER — SEMAGLUTIDE 0.68 MG/ML
0.5 INJECTION, SOLUTION SUBCUTANEOUS
Qty: 3 ML | Refills: 0 | Status: SHIPPED | OUTPATIENT
Start: 2023-05-02 | End: 2023-05-24

## 2023-04-04 RX ORDER — SEMAGLUTIDE 1.34 MG/ML
1 INJECTION, SOLUTION SUBCUTANEOUS
Qty: 3 ML | Refills: 0 | Status: SHIPPED | OUTPATIENT
Start: 2023-05-30 | End: 2023-06-21

## 2023-04-04 NOTE — PROGRESS NOTES
Subjective   Gregory Baez is a 22 y.o. female presents for vasovagal syncope, right upper quadrant abdominal pain, palpitations, depression, GERD, hyperlipidemia, low-grade squamous intraepithelial lesion of the cytological smear of the cervix, abnormal bowel habits, elevated blood pressure, moderate episode of recurrent depression, and class I obesity due to excess calories with serious comorbidities.    Chief Complaint   Patient presents with   • Follow-up     3 month  Wants med for weight loss       Health Maintenance Due   Topic Date Due   • COVID-19 Vaccine (1) Never done   • Pneumococcal Vaccine 0-64 (1 - PCV) 09/12/2004   • TDAP/TD VACCINES (2 - Td or Tdap) 09/14/2020       She reports that she is unable to lose weight. She diets and exercises, but has only lost 3 pounds. She denies a family history of medullary thyroid cancer.     Her right upper quadrant pain has improved. She has not had any issues with heartburn since her son was born.     She denies any recent vasovagal syncope episodes. She has not noticed any palpitations lately.     She reports that her mood is up and down. She has good days and bad days. She denies any SI or HI. She is currently taking Lexapro, BuSpar, and Vraylar 1.5 mg. She has an appointment with [facility], at behavioral health next week.    She monitors her saturated fat intake.     Her last pap smear was at the end of 2022 and was normal. She had it done at [facility].     She thinks her abnormal bowel habits are due to her anxiety. When she is anxious, she has diarrhea frequently.     Her blood pressure is well controlled today.     She is allergic to PENICILLIN which causes hives and vomiting. She is due for her eye and dental exam. She performs self-breast exams monthly.     Vitals:    04/04/23 1626 04/04/23 1630   BP: 128/87 128/88   BP Location: Right arm Left arm   Patient Position: Sitting Sitting   Cuff Size: Adult Adult   Pulse: 75   "  Temp: 98 °F (36.7 °C)    TempSrc: Tympanic    SpO2: 97%    Weight: 84.1 kg (185 lb 6.4 oz)    Height: 162.6 cm (64.02\")      Body mass index is 31.81 kg/m².    Current Outpatient Medications on File Prior to Visit   Medication Sig Dispense Refill   • Blood Pressure Monitoring (Blood Pressure Cuff) misc 1 each 2 (Two) Times a Day. 1 each 0   • busPIRone (BUSPAR) 5 MG tablet Take 1 tablet by mouth 3 (Three) Times a Day. 90 tablet 3   • escitalopram (Lexapro) 20 MG tablet Take 1 tablet by mouth Daily. 90 tablet 1   • polyethylene glycol (GlycoLax) 17 GM/SCOOP powder Take 17 g by mouth Daily. 3350 g 1   • Vraylar 1.5 MG capsule capsule TAKE 1 CAPSULE BY MOUTH EVERY DAY 15 capsule 3   • azithromycin (Zithromax Z-Reece) 250 MG tablet Take 2 tablets the first day, then 1 tablet daily for 4 days. (Patient not taking: Reported on 4/4/2023) 6 tablet 0     No current facility-administered medications on file prior to visit.       The following portions of the patient's history were reviewed and updated as appropriate: allergies, current medications, past family history, past medical history, past social history, past surgical history and problem list.    Review of Systems   Constitutional: Negative for chills, diaphoresis and fever.   HENT: Negative for hearing loss.    Eyes: Negative for blurred vision and double vision.   Respiratory: Negative for cough, chest tightness and wheezing.    Cardiovascular: Negative for palpitations.   Genitourinary: Negative for dysuria, hematuria and vaginal discharge.   Neurological: Negative for dizziness, seizures, syncope and headache.         Objective   Physical Exam  HENT:      Right Ear: Tympanic membrane normal.      Left Ear: Tympanic membrane normal.      Mouth/Throat:      Pharynx: Oropharynx is clear.   Eyes:      Pupils: Pupils are equal, round, and reactive to light.   Neck:      Vascular: No carotid bruit.      Comments: No cervical or suboccipital adenopathy. Thyroid feels " normal.  Cardiovascular:      Rate and Rhythm: Normal rate and regular rhythm.      Heart sounds: No murmur heard.  Pulmonary:      Breath sounds: Normal breath sounds.      Comments: Lungs are normal in all six lung fields.  Abdominal:      General: Bowel sounds are normal.      Palpations: There is no mass.      Tenderness: There is no abdominal tenderness.   Musculoskeletal:      Comments: No pretibial edema.         PHQ-9 Total Score:      Assessment & Plan   Diagnoses and all orders for this visit:    1. Vasovagal syncope (Primary)    2. RUQ abdominal pain    3. Palpitations    4. Depression, unspecified depression type    5. Gastroesophageal reflux disease, unspecified whether esophagitis present    6. Other hyperlipidemia  Comments:  Follow-up for fasting labs.       7. Low grade squamous intraepithelial lesion on cytologic smear of cervix (LGSIL)    8. Abnormal bowel habits    9. Elevated BP without diagnosis of hypertension    10. Moderate episode of recurrent major depressive disorder    11. Class 1 obesity due to excess calories with serious comorbidity and body mass index (BMI) of 31.0 to 31.9 in adult  Comments:  Ozempic prescribed. Dosing and side effects discussed with patient.       Other orders  -     Semaglutide,0.25 or 0.5MG/DOS, (Ozempic, 0.25 or 0.5 MG/DOSE,) 2 MG/1.5ML solution pen-injector; Inject 0.25 mg under the skin into the appropriate area as directed 1 (One) Time Per Week. For four weeks  Dispense: 1.5 mL; Refill: 0  -     Semaglutide,0.25 or 0.5MG/DOS, (Ozempic, 0.25 or 0.5 MG/DOSE,) 2 MG/3ML solution pen-injector; Inject 0.75 mL under the skin into the appropriate area as directed Every 7 (Seven) Days for 4 doses. Continue 4 weeks  Dispense: 3 mL; Refill: 0  -     Semaglutide, 1 MG/DOSE, (Ozempic, 1 MG/DOSE,) 4 MG/3ML solution pen-injector; Inject 1 mg under the skin into the appropriate area as directed Every 7 (Seven) Days for 4 doses. Continue for 4 weeks  Dispense: 3 mL; Refill:  0  -     Semaglutide, 2 MG/DOSE, 8 MG/3ML solution pen-injector; Inject 2 mg under the skin into the appropriate area as directed Every 7 (Seven) Days for 4 doses. Continue for 4 weeks  Dispense: 3 mL; Refill: 0          Patient Instructions     Health Maintenance Due   Topic Date Due   • COVID-19 Vaccine (1) Never done   • Pneumococcal Vaccine 0-64 (1 - PCV) 09/12/2004   • TDAP/TD VACCINES (2 - Td or Tdap) 09/14/2020    12 hour fast for labs and get done at [facility]         Transcribed from ambient dictation for Anna Kenney MD by Rabia Alston.  04/04/23   18:15 EDT    Patient or patient representative verbalized consent to the visit recording.  I have personally performed the services described in this document as transcribed by the above individual, and it is both accurate and complete.

## 2023-04-04 NOTE — PATIENT INSTRUCTIONS
Health Maintenance Due   Topic Date Due    COVID-19 Vaccine (1) Never done    Pneumococcal Vaccine 0-64 (1 - PCV) 09/12/2004    TDAP/TD VACCINES (2 - Td or Tdap) 09/14/2020    12 hour fast for labs and get done at [facility]

## 2023-04-05 ENCOUNTER — TELEPHONE (OUTPATIENT)
Dept: FAMILY MEDICINE CLINIC | Facility: CLINIC | Age: 23
End: 2023-04-05
Payer: COMMERCIAL

## 2023-04-06 ENCOUNTER — TELEPHONE (OUTPATIENT)
Dept: FAMILY MEDICINE CLINIC | Facility: CLINIC | Age: 23
End: 2023-04-06
Payer: COMMERCIAL

## 2023-04-06 NOTE — TELEPHONE ENCOUNTER
Please tell Patient that the compound that they offer at [facility] has not been FDA approved and I won't prescribe-sorry.  If she checks with insurance and sees what they would allow that is FDA approved I'll prescribe

## 2023-04-06 NOTE — TELEPHONE ENCOUNTER
Pharmacy Name: [facility] [place][id number] [place] - [address] [phone] [facility] [phone]      Pharmacy representative name: AUGUSTINE         What medication are you calling in regards to: OZEMPIC     What question does the pharmacy have: PHARMACY NEEDED AN OK FROM OFFICE TO USE THE COMPOUNDED VERSION OF MEDICATION     Who is the provider that prescribed the medication:     DR PARTIDA   Additional notes:

## 2023-04-14 ENCOUNTER — TELEPHONE (OUTPATIENT)
Dept: FAMILY MEDICINE CLINIC | Facility: CLINIC | Age: 23
End: 2023-04-14
Payer: COMMERCIAL

## 2023-04-14 NOTE — LETTER
April 18, 2023    Gregory Baez  [address]  [place] [facility]          Please call our office at [phone] to schedule an appointment for your overdue labs on a Wednesday or a Friday.  If you have already completed your labs please let us know where so we can obtain them for your records.     If you do not plan to get these done please let us know so we can cancel them.                      Anna Kenney MD

## 2023-04-17 ENCOUNTER — OFFICE VISIT (OUTPATIENT)
Dept: PSYCHIATRY | Facility: CLINIC | Age: 23
End: 2023-04-17
Payer: COMMERCIAL

## 2023-04-17 DIAGNOSIS — F33.1 MODERATE EPISODE OF RECURRENT MAJOR DEPRESSIVE DISORDER: Primary | ICD-10-CM

## 2023-04-17 PROCEDURE — 90837 PSYTX W PT 60 MINUTES: CPT | Performed by: SOCIAL WORKER

## 2023-04-17 NOTE — PROGRESS NOTES
Date: April 17, 2023  Time In: 0802  Time Out: 0856      PROGRESS NOTE  Data:  Gregory Baez is a 22 y.o. female who presents today for individual therapy session at [facility] with UMESH Steven, ERIC.     Patient Chief Complaint: Follow-up for depression    Clinical Maneuvering/Intervention: Gregory is pleasant, alert and oriented to person place and time.  She spoke about the relaxed breathing helping her depression.  Also, she redirects herself with negative self talk.  She spoke at length about the family dynamics that she has with her 's family.  She spoke about some maladaptive behaviors within his family regarding poor boundaries and devaluing.  And we discussed how she can have better boundaries with his family and how she and her  can be on the same page regarding the needs and wants that they have as a family.    Assisted patient in processing above session content; acknowledged and normalized patient’s thoughts, feelings, and concerns. Rationalized patient thought process regarding family dynamics and boundary. Discussed triggers associated with patient's depression. Also discussed coping skills for patient to implement such as continuing with skills already built; reading books and/or articles regarding boundaries.    Allowed patient to freely discuss issues without interruption or judgment. Provided safe, confidential environment to facilitate the development of positive therapeutic relationship and encourage open, honest communication. Assisted patient in identifying risk factors which would indicate the need for higher level of care including thoughts to harm self or others and/or self-harming behavior and encouraged patient to contact this office, call 911, or present to the nearest emergency room should any of these events occur. Discussed crisis intervention services and means to access. Patient adamantly and convincingly denies current suicidal or  homicidal ideation or perceptual disturbance.    Assessment   Patient appears to maintain relative stability as compared to their baseline. However, patient continues to struggle with depression  which continues to cause impairment in important areas of functioning. A result, they can be reasonably expected to continue to benefit from treatment and would likely be at increased risk for decompensation otherwise.    Mental Status Exam:   Hygiene:   good  Cooperation:  Cooperative  Eye Contact:  Good  Psychomotor Behavior:  Appropriate  Affect:  Appropriate  Mood: sad  Speech:  Normal  Thought Process:  Goal directed and Linear  Thought Content:  Normal  Suicidal:  None  Homicidal:  None  Hallucinations:  None  Delusion:  None  Memory:  Intact  Orientation:  Person, Place, Time and Situation  Reliability:  good  Insight:  Good  Judgement:  Good  Impulse Control:  Good  Physical/Medical Issues:  See diagnosis list     PHQ-Score Total:  PHQ-9 Total Score: PHQ-9 Depression Screening  Little interest or pleasure in doing things? 1-->several days   Feeling down, depressed, or hopeless? 1-->several days   Trouble falling or staying asleep, or sleeping too much? 0-->not at all   Feeling tired or having little energy? 1-->several days   Poor appetite or overeating? 0-->not at all   Feeling bad about yourself - or that you are a failure or have let yourself or your family down? 1-->several days   Trouble concentrating on things, such as reading the newspaper or watching television? 0-->not at all   Moving or speaking so slowly that other people could have noticed? Or the opposite - being so fidgety or restless that you have been moving around a lot more than usual? 0-->not at all   Thoughts that you would be better off dead, or of hurting yourself in some way? 0-->not at all   PHQ-9 Total Score 4   If you checked off any problems, how difficult have these problems made it for you to do your work, take care of things at home, or  get along with other people?        MAAME-7 Total Score:   Over the last two weeks, how often have you been bothered by the following problems?  Feeling nervous, anxious or on edge: Several days  Not being able to stop or control worrying: Not at all  Worrying too much about different things: Several days  Trouble Relaxing: Not at all  Being so restless that it is hard to sit still: Not at all  Becoming easily annoyed or irritable: Not at all  Feeling afraid as if something awful might happen: Several days  MAAME 7 Total Score: 3     Patient's Support Network Includes:  significant other    Functional Status: Moderate impairment     Progress toward goal: Not at goal    Prognosis: Good with Ongoing Treatment          Plan     Resources: Patient was provided with the following community resources: None at this time    Patient will continue in individual outpatient therapy with focus on improved functioning and coping skills, maintaining stability, and avoiding decompensation and the need for higher level of care.    Patient will adhere to any medication regimens as prescribed and report any side effects. Patient will contact this office, call 911 or present to the nearest emergency room should suicidal or homicidal ideations occur. Provide cognitive behavioral therapy and solution focused therapy to improve functioning, maintain stability and avoid decompensation and the need for higher level of care.     Return at first available, or earlier if symptoms worsen or fail to improve.           VISIT DIAGNOSIS:     ICD-10-CM ICD-9-CM   1. Moderate episode of recurrent major depressive disorder  F33.1 296.32            This document has been electronically signed by UMESH Steven, ERIC   April 17, 2023 08:58 EDT      Part of this note may be an electronic transcription/translation of spoken language to printed text using the Dragon Dictation System.

## 2023-04-25 ENCOUNTER — TELEPHONE (OUTPATIENT)
Dept: FAMILY MEDICINE CLINIC | Facility: CLINIC | Age: 23
End: 2023-04-25
Payer: COMMERCIAL

## 2023-04-25 NOTE — TELEPHONE ENCOUNTER
Please call our office to schedule an appointment for your over due labs on a Wednesday or a Friday.  If you have already completed your labs please let us know where so we can obtain them for your records.    If you do not plan to get these done please let us know so we can cancel them.

## 2023-04-25 NOTE — TELEPHONE ENCOUNTER
----- Message from Barby Medina sent at 4/13/2023  9:24 AM EDT -----      ----- Message -----  From: Barby Medina  Sent: 12/14/2022   7:57 AM EDT  To: Barby Medina        ----- Message -----  From: SYSTEM  Sent: 12/14/2022   1:15 AM EST  To: Lashell Trevizo [facility]

## 2023-05-04 ENCOUNTER — TELEPHONE (OUTPATIENT)
Dept: FAMILY MEDICINE CLINIC | Facility: CLINIC | Age: 23
End: 2023-05-04
Payer: COMMERCIAL

## 2023-05-09 NOTE — PROGRESS NOTES
Subjective   Gregory Baez is a 22 y.o. female who presents today for follow up for psychiatric medication management.     Chief Complaint:  Bipolar disorder, anxiety     History of Present Illness:     Medication adjustments last visit:   Continue lexapro 20mg daily. Refill not needed today.   Continue buspirone 5mg twice a day.   Started Vraylar.       At today's visit, patient states she feels like the past 5 weeks, she finds it more of a task to take care of herself. She doesn't want to shower, doesn't want to get fixed up. Doesn't care to feel pretty.   She does have a lot of life stressors right now. She got a new position at work. Her relationship is kind of jairo right now. They are trying to get a house.   Her son is going to be 2 in August. He is throwing a lot of tantrums.   She feels like she doesn't have mood swings anymore, and she doesn't snap as easily, so the Vraylar has helped in that way. However, she has felt more unmotivated since starting it. She also has had passive thoughts of SI since starting Vraylar.   She feels like when she is driving she wants to drive into oncoming traffic. She doesn't think she would ever do it, and the thoughts are fleeting, but prior to being on Vraylar, she was not having these thoughts. Not in a long time.   She is in counseling with Rachelle.   Since she has had an increase in passive SI since starting it, I advised her that we would switch the medication. Will switch from Vraylar to Caplyta.   Instructions: Take Vraylar every other day for one week then stop medication. At the same time, start Caplyta 21mg.   Patient states she can call the office if she starts to have more intrusive thoughts of suicide or thoughts that she thinks she would act on.     Patient completed mood disorder questionnaire and checked yes to 11 out of 17 item. Likely points to a bipolar disorder. She does endorse hypomania in the past, with no true manic episode.     Past medical  history: GERD, depression, anxiety, hyperlipidemia, chronic constipation, vitamin D deficiency  Past psychiatric history: depression, anxiety   Family history: mom's sister had schizophrenia, mom had bipolar and schizophrenia, 4 brothers--3 of them should be seen for some type of disorder, but don't get seen.   Social history: Patient is not a smoker, does not currently vape, does not report any alcohol use and does not report any recreational drug use.     Patient presents with symptoms and behaviors that are consistent with the following DSM-5 diagnoses:  1. Bipolar II disorder  2. Generalized anxiety disorder    The following portions of the patient's history were reviewed and updated as appropriate: allergies, current medications, past family history, past medical history, past social history, past surgical history and problem list.    PAST OUTPATIENT TREATMENT  Diagnosis treated:  Affective Disorder, Anxiety/Panic Disorder  Treatment Type:  Medication Management  Prior Psychiatric Medications:  Lexapro  Buspirone  Prozac-didn't work  Zoloft-made her like a zombie, didn't care about anything  Vraylar--felt unmotivated, started having passive SI  Support Groups:  None  Sequelae Of Mental Disorder:  job disruption, social isolation, family disruption, emotional distress    Interval History  Deteriorated     Side Effects  Vraylar--felt more unmotivated and had passive SI      Past Medical History:  Past Medical History:   Diagnosis Date   • Abnormal bowel habits    • Anxiety    • Asthma     childhood.   • Dizziness    • GERD (gastroesophageal reflux disease)        Social History:  Social History     Socioeconomic History   • Marital status: Single   Tobacco Use   • Smoking status: Former     Packs/day: 0.25     Years: 3.00     Pack years: 0.75     Types: Cigarettes     Start date: 2017     Quit date: 2020     Years since quittin.3     Passive exposure: Past   • Smokeless tobacco: Former   • Tobacco  "comments:     weaning \"hasn't had cigarette in 3 days\"      Vaping - 1 vap cartridge last 2-3 weeks, nicotine   Vaping Use   • Vaping Use: Former   • Quit date: 3/10/2023   • Substances: Nicotine   Substance and Sexual Activity   • Alcohol use: Not Currently     Alcohol/week: 2.0 - 3.0 standard drinks     Types: 2 - 3 Glasses of wine per week     Comment: socially - 1 weekend out of the month   • Drug use: Not Currently     Frequency: 4.0 times per week     Types: Marijuana     Comment: occasionally - quit when found she was pregnant   • Sexual activity: Yes     Partners: Male     Birth control/protection: Condom, None       Family History:  Family History   Problem Relation Age of Onset   • Hypertension Father    • Diabetes Father    • Hyperlipidemia Father    • Alcohol abuse Father    • Hypertension Maternal Grandmother    • Hyperlipidemia Maternal Grandmother    • Stroke Maternal Grandmother    • Hypertension Paternal Grandmother    • Diabetes Paternal Grandmother    • Hyperlipidemia Paternal Grandmother    • Heart disease Paternal Grandmother    • Cancer Paternal Grandmother    • Hypertension Paternal Grandfather    • Diabetes Paternal Grandfather    • Hyperlipidemia Paternal Grandfather    • Heart disease Paternal Grandfather    • Stroke Paternal Grandfather    • Alcohol abuse Maternal Grandfather    • Stroke Maternal Grandfather        Past Surgical History:  Past Surgical History:   Procedure Laterality Date   • ADENOIDECTOMY     • COLONOSCOPY N/A 12/3/2020    Procedure: COLONOSCOPY with random colon biopsy;  Surgeon: Bear Caraballo MD;  Location: [facility] END[facility]COPY;  Service: Gastroenterology;  Laterality: N/A;  Post op: normal colon, random comon biopsy   • DENTAL PROCEDURE     • ENDOSCOPY N/A 12/3/2020    Procedure: ESOPHAGOGASTRODUODENOSCOPY with biopsy x2;  Surgeon: Bear Caraballo MD;  Location: [facility] ENDOSCOPY;  Service: Gastroenterology;  Laterality: N/A;  post op: duodenitis, gastritis, biopsy x2 "   • MYRINGOTOMY W/ TUBES     • TONSILLECTOMY         Problem List:  Patient Active Problem List   Diagnosis   • Vasovagal syncope   • Palpitations   • Anxiety   • GERD (gastroesophageal reflux disease)   • Dizziness   • Other hyperlipidemia   • Low grade squamous intraepithelial lesion on cytologic smear of cervix (LGSIL)   • Abnormal bowel habits   • Elevated BP without diagnosis of hypertension   • Obesity   • Class 1 obesity due to excess calories with serious comorbidity and body mass index (BMI) of 31.0 to 31.9 in adult   • Moderate episode of recurrent major depressive disorder       Allergy:   Allergies   Allergen Reactions   • Penicillins Hives and Nausea And Vomiting        Discontinued Medications:  Medications Discontinued During This Encounter   Medication Reason   • polyethylene glycol (GlycoLax) 17 GM/SCOOP powder *Therapy completed   • Semaglutide, 1 MG/DOSE, (Ozempic, 1 MG/DOSE,) 4 MG/3ML solution pen-injector *Therapy completed   • Semaglutide, 2 MG/DOSE, 8 MG/3ML solution pen-injector *Therapy completed   • Semaglutide,0.25 or 0.5MG/DOS, (Ozempic, 0.25 or 0.5 MG/DOSE,) 2 MG/3ML solution pen-injector *Therapy completed   • Semaglutide-Weight Management 1 MG/0.5ML solution auto-injector *Therapy completed   • Vraylar 1.5 MG capsule capsule        Current Medications:   Current Outpatient Medications   Medication Sig Dispense Refill   • busPIRone (BUSPAR) 5 MG tablet Take 1 tablet by mouth 3 (Three) Times a Day. 90 tablet 3   • escitalopram (Lexapro) 20 MG tablet Take 1 tablet by mouth Daily. 90 tablet 1   • Semaglutide,0.25 or 0.5MG/DOS, (Ozempic, 0.25 or 0.5 MG/DOSE,) 2 MG/1.5ML solution pen-injector Inject 0.25 mg under the skin into the appropriate area as directed 1 (One) Time Per Week. For four weeks 1.5 mL 0   • Blood Pressure Monitoring (Blood Pressure Cuff) misc 1 each 2 (Two) Times a Day. 1 each 0   • Lumateperone Tosylate (Caplyta) 21 MG capsule Take 21 mg by mouth Daily With Dinner. 30  capsule 3     No current facility-administered medications for this visit.         Psychological ROS: positive for - anxiety, concentration difficulties, depression, irritability, mood swings and sleep disturbances  negative for - behavioral disorder, decreased libido, disorientation, hallucinations, hostility, memory difficulties, obsessive thoughts, physical abuse, sexual abuse or suicidal ideation      Physical Exam:   Blood pressure 124/81, pulse 80, not currently breastfeeding.    Mental Status Exam:   Hygiene:   good  Cooperation:  Cooperative  Eye Contact:  Good  Psychomotor Behavior:  Appropriate  Affect:  Appropriate  Mood: anxious  Hopelessness: Denies  Speech:  Normal  Thought Process:  Goal directed and Linear  Thought Content:  Normal  Suicidal:  None  Homicidal:  None  Hallucinations:  None  Delusion:  None  Memory:  Intact  Orientation:  Person, Place, Time and Situation  Reliability:  good  Insight:  Good  Judgement:  Good  Impulse Control:  Good  Physical/Medical Issues:  No      Mental status exam was reviewed and compared to visit on 3/6/23 and appropriate updates were made.     PHQ-9 Depression Screening    Little interest or pleasure in doing things? 2-->more than half the days   Feeling down, depressed, or hopeless? 1-->several days   Trouble falling or staying asleep, or sleeping too much? 0-->not at all   Feeling tired or having little energy? 1-->several days   Poor appetite or overeating? 0-->not at all   Feeling bad about yourself - or that you are a failure or have let yourself or your family down? 0-->not at all   Trouble concentrating on things, such as reading the newspaper or watching television? 1-->several days   Moving or speaking so slowly that other people could have noticed? Or the opposite - being so fidgety or restless that you have been moving around a lot more than usual? 0-->not at all   Thoughts that you would be better off dead, or of hurting yourself in some way?  1-->several days   PHQ-9 Total Score 6   If you checked off any problems, how difficult have these problems made it for you to do your work, take care of things at home, or get along with other people? somewhat difficult    Gage Suicide Severity Rating (C-SSRS)  In the past month, have you wished you were dead or wished you could go to sleep and not wake up? yes     In the past month, have you actually had any thoughts of killing yourself? yes     Have you been thinking about how you might do this? yes     Have you had these thoughts and had some intention of acting on them? no     Have you started to work out or have you worked out the details of how to kill yourself? no     Have you ever in your lifetime done anything, started to do anything, or prepared to do anything to end your life? no       Was this within the past three months?       Level of Risk per Screen moderate risk       Former smoker    I advised Gregory of the risks of tobacco use.     Result Review:    Labs:  Admission on 03/17/2023, Discharged on 03/17/2023   Component Date Value Ref Range Status   • SARS Antigen 03/17/2023 Not Detected  Not Detected, Presumptive Negative Final   • Influenza A Antigen RIZWAN 03/17/2023 Not Detected  Not Detected Final   • Influenza B Antigen RIZWAN 03/17/2023 Not Detected  Not Detected Final   • Internal Control 03/17/2023 Passed  Passed Final   • Lot Number 03/17/2023 [id number]   Final   • Expiration Date 03/17/2023 03/15/2024   Final   • Rapid Strep A Screen 03/17/2023 Positive (A)   Final   • Internal Control 03/17/2023 Passed   Final   • Lot Number 03/17/2023 [id number]   Final   • Expiration Date 03/17/2023 09/12/2024   Final       Assessment & Plan   Diagnoses and all orders for this visit:    1. Bipolar II disorder (Primary)  -     Lumateperone Tosylate (Caplyta) 21 MG capsule; Take 21 mg by mouth Daily With Dinner.  Dispense: 30 capsule; Refill: 3    2. Generalized anxiety disorder         Continue lexapro  20mg daily. Refill not needed today.   Continue buspirone 5mg twice a day.   Take Vraylar every other day for one week then stop medication.   Start Caplyta 21mg every evening with dinner.     Visit Diagnoses:    ICD-10-CM ICD-9-CM   1. Bipolar II disorder  F31.81 296.89   2. Generalized anxiety disorder  F41.1 300.02       TREATMENT PLAN/GOALS: Continue supportive psychotherapy efforts and medications as indicated. Treatment and medication options discussed during today's visit. Patient ackowledged and verbally consented to continue with current treatment plan and was educated on the importance of compliance with treatment and follow-up appointments.    MEDICATION ISSUES:  INSPECT reviewed as expected    Discussed medication options and treatment plan of prescribed medication as well as the risks, benefits, and side effects including potential falls, possible impaired driving and metabolic adversities among others. Patient is agreeable to call the office with any worsening of symptoms or onset of side effects. Patient is agreeable to call 911 or go to the nearest ER should he/she begin having SI/HI. No medication side effects or related complaints today.     MEDS ORDERED DURING VISIT:  New Medications Ordered This Visit   Medications   • Lumateperone Tosylate (Caplyta) 21 MG capsule     Sig: Take 21 mg by mouth Daily With Dinner.     Dispense:  30 capsule     Refill:  3       Return in about 3 months (around 7/31/2023).         This document has been electronically signed by LIMA Kinsey  May 10, 2023 10:15 EDT    Part of this note may be an electronic transcription/translation of spoken language to printed text using the Dragon Dictation System.

## 2023-05-10 ENCOUNTER — OFFICE VISIT (OUTPATIENT)
Dept: PSYCHIATRY | Facility: CLINIC | Age: 23
End: 2023-05-10
Payer: COMMERCIAL

## 2023-05-10 VITALS — DIASTOLIC BLOOD PRESSURE: 81 MMHG | SYSTOLIC BLOOD PRESSURE: 124 MMHG | HEART RATE: 80 BPM

## 2023-05-10 DIAGNOSIS — F31.81 BIPOLAR II DISORDER: Primary | Chronic | ICD-10-CM

## 2023-05-10 DIAGNOSIS — F41.1 GENERALIZED ANXIETY DISORDER: Chronic | ICD-10-CM

## 2023-05-10 RX ORDER — LUMATEPERONE 21 MG/1
21 CAPSULE ORAL
Qty: 30 CAPSULE | Refills: 3 | Status: SHIPPED | OUTPATIENT
Start: 2023-05-10 | End: 2023-05-12 | Stop reason: SDUPTHER

## 2023-05-12 ENCOUNTER — TELEPHONE (OUTPATIENT)
Dept: PSYCHIATRY | Facility: CLINIC | Age: 23
End: 2023-05-12
Payer: COMMERCIAL

## 2023-05-12 DIAGNOSIS — F31.81 BIPOLAR II DISORDER: Chronic | ICD-10-CM

## 2023-05-12 RX ORDER — LUMATEPERONE 21 MG/1
21 CAPSULE ORAL
Qty: 30 CAPSULE | Refills: 3 | Status: SHIPPED | OUTPATIENT
Start: 2023-05-12 | End: 2023-05-15 | Stop reason: SDUPTHER

## 2023-05-12 NOTE — TELEPHONE ENCOUNTER
Patient called stating there was an issue with her Caplyta being filled.    I called [facility], spoke with a tech, he said that the Caplyta was on backorder and did not when they would get any in.    Called patient and asked her to call and find what pharmacy takes her insurance.  She stated [facility] [address].    Can we resend the Caplyta 21 mg to [facility] [address]

## 2023-05-15 RX ORDER — LUMATEPERONE 21 MG/1
21 CAPSULE ORAL
Qty: 30 CAPSULE | Refills: 3 | Status: SHIPPED | OUTPATIENT
Start: 2023-05-15

## 2023-05-15 NOTE — TELEPHONE ENCOUNTER
Patient called back.    [facility] takes her insurance. Can we send her Caplyta 21 mg to [facility] on [address].    Thanks

## 2023-05-15 NOTE — TELEPHONE ENCOUNTER
[facility] does not accept her insurance.  Spoke with patient and she is going to call her insurance and see where we can send her medicine. She will call me back with that pharmacy so we can resend

## 2023-05-30 RX ORDER — ESCITALOPRAM OXALATE 20 MG/1
TABLET ORAL
Qty: 90 TABLET | Refills: 1 | Status: SHIPPED | OUTPATIENT
Start: 2023-05-30

## 2023-06-16 ENCOUNTER — TELEPHONE (OUTPATIENT)
Dept: PSYCHIATRY | Facility: CLINIC | Age: 23
End: 2023-06-16
Payer: COMMERCIAL

## 2023-06-16 DIAGNOSIS — F31.81 BIPOLAR II DISORDER: Primary | Chronic | ICD-10-CM

## 2023-06-16 RX ORDER — LUMATEPERONE 21 MG/1
21 CAPSULE ORAL
Qty: 30 CAPSULE | Refills: 3 | Status: SHIPPED | OUTPATIENT
Start: 2023-06-16

## 2023-06-16 NOTE — TELEPHONE ENCOUNTER
Patient called, [facility] told her they are out of her Caplyta and will be a week or so before they get any in. She took her last one today.  She [facility] the other [facility] to try a transfer, but they are out also.  She found some at [facility]. Can we send her 1 month there please.

## 2023-10-05 ENCOUNTER — OFFICE VISIT (OUTPATIENT)
Dept: FAMILY MEDICINE CLINIC | Facility: CLINIC | Age: 23
End: 2023-10-05
Payer: COMMERCIAL

## 2023-10-05 VITALS
DIASTOLIC BLOOD PRESSURE: 88 MMHG | WEIGHT: 195 LBS | HEIGHT: 64 IN | TEMPERATURE: 98 F | HEART RATE: 69 BPM | BODY MASS INDEX: 33.29 KG/M2 | OXYGEN SATURATION: 95 % | SYSTOLIC BLOOD PRESSURE: 137 MMHG

## 2023-10-05 DIAGNOSIS — R03.0 ELEVATED BP WITHOUT DIAGNOSIS OF HYPERTENSION: ICD-10-CM

## 2023-10-05 DIAGNOSIS — R55 VASOVAGAL SYNCOPE: ICD-10-CM

## 2023-10-05 DIAGNOSIS — E78.49 OTHER HYPERLIPIDEMIA: ICD-10-CM

## 2023-10-05 DIAGNOSIS — F33.1 MODERATE EPISODE OF RECURRENT MAJOR DEPRESSIVE DISORDER: ICD-10-CM

## 2023-10-05 DIAGNOSIS — E55.9 VITAMIN D DEFICIENCY: ICD-10-CM

## 2023-10-05 DIAGNOSIS — R19.8 ABNORMAL BOWEL HABITS: ICD-10-CM

## 2023-10-05 DIAGNOSIS — E66.09 CLASS 1 OBESITY DUE TO EXCESS CALORIES WITH SERIOUS COMORBIDITY AND BODY MASS INDEX (BMI) OF 33.0 TO 33.9 IN ADULT: ICD-10-CM

## 2023-10-05 DIAGNOSIS — Z00.01 ENCOUNTER FOR ANNUAL GENERAL MEDICAL EXAMINATION WITH ABNORMAL FINDINGS IN ADULT: Primary | ICD-10-CM

## 2023-10-05 DIAGNOSIS — Z12.4 SCREENING FOR CERVICAL CANCER: ICD-10-CM

## 2023-10-05 DIAGNOSIS — R87.612 LOW GRADE SQUAMOUS INTRAEPITHELIAL LESION ON CYTOLOGIC SMEAR OF CERVIX (LGSIL): ICD-10-CM

## 2023-10-05 DIAGNOSIS — R00.2 PALPITATIONS: ICD-10-CM

## 2023-10-05 DIAGNOSIS — K21.9 GASTROESOPHAGEAL REFLUX DISEASE, UNSPECIFIED WHETHER ESOPHAGITIS PRESENT: ICD-10-CM

## 2023-10-05 DIAGNOSIS — Z83.49 FAMILY HISTORY OF THYROID DISEASE: ICD-10-CM

## 2023-10-05 NOTE — PROGRESS NOTES
"Chief Complaint  Annual Exam (Feels she needs her thyroid checked out by endocrinologist due to weight gain and hair loss)    Subjective        Gregory Baez presents to [facility]  History of Present Illness    Gregory Beaz presents today for her annual wellness exam screening for cervical cancer, palpitations, hyperlipidemia, moderate episode of recurrent major depression, vasovagal symptoms, low grade squamous lesion of the cervix, GERD, elevated blood pressure without hypertension, abnormal bowel habits, vitamin D deficiency and class I obesity with serious comorbidities. Allergy to penicillin.    The patient reports having heart flutters back in the summer, but has not had any since then. She denies syncopal episodes.    The patient states she has discontinued taking her depression medication and would like a referral to another psychiatrist. She denies feeling homicidal or suicidal.    The patient reports her heartburn is controlled with Tums. She states her abnormal bowel habits have resolved. She notes taking vitamin D.    The patient reports being tired all the time and is requesting her thyroid levels to be checked. She states she can nap anytime of the day, everyday. She notes she has been told that she snores. She states she is has been unable to lose weight with diet and exercise. She notes drinking 120 ounces of water daily. She reports having family members who have thyroid problems. She states her menstrual cycle is irregular.    Objective   Vital Signs:  /88 (BP Location: Right arm, Patient Position: Sitting, Cuff Size: Large Adult)   Pulse 69   Temp 98 °F (36.7 °C) (Tympanic)   Ht 162.6 cm (64.02\")   Wt 88.5 kg (195 lb)   SpO2 95%   BMI 33.45 kg/m²   Estimated body mass index is 33.45 kg/m² as calculated from the following:    Height as of this encounter: 162.6 cm (64.02\").    Weight as of this encounter: 88.5 kg (195 lb).         ROS:   Negative " changes in hearing   Negative syncope   Positive fatigue  Negative coughing up blood or sputum   Negative wheezing   Negative chest pain, pressure or flutters   Negative swallowing issues   Negative bowel or digestion issues   Negative constipation or diarrhea  Negative dark or red stools   Negative dysuria or hematuria   Negative vaginal discharge  Negative fever, chills or night sweats   Negative weight loss  Positive at home self breast exams  Positive eye and dental exams are current    Physical Exam  Constitutional:       Appearance: Normal appearance.   HENT:      Right Ear: Tympanic membrane normal.      Left Ear: Tympanic membrane normal.      Mouth/Throat:      Mouth: Mucous membranes are moist.      Pharynx: Oropharynx is clear.   Eyes:      Pupils: Pupils are equal, round, and reactive to light.   Neck:      Comments: No thyromegaly, thyroid masses, cervical or suboccipital adenopathy  Cardiovascular:      Rate and Rhythm: Normal rate and regular rhythm.      Pulses: Normal pulses.      Heart sounds: Normal heart sounds. No murmur heard.  Pulmonary:      Effort: Pulmonary effort is normal.      Breath sounds: Normal breath sounds.   Abdominal:      General: Bowel sounds are normal. There is no distension.      Palpations: There is no mass.      Tenderness: There is no abdominal tenderness.   Musculoskeletal:      Right lower leg: No edema.      Left lower leg: No edema.   Neurological:      Mental Status: She is alert.   Psychiatric:         Mood and Affect: Mood normal.         Behavior: Behavior normal.      Result Review :                   Assessment and Plan   Diagnoses and all orders for this visit:    1. Encounter for annual general medical examination with abnormal findings in adult (Primary)    2. Screening for cervical cancer  -     Ambulatory Referral to Gynecology    3. Palpitations  -     TSH; Future    4. Other hyperlipidemia  -     Comprehensive Metabolic Panel; Future  -     Lipid Panel;  Future    5. Moderate episode of recurrent major depressive disorder  -     Vitamin B12; Future  -     Ambulatory Referral to Psychiatry    6. Vasovagal syncope    7. Low grade squamous intraepithelial lesion on cytologic smear of cervix (LGSIL)  -     Ambulatory Referral to Gynecology    8. Gastroesophageal reflux disease, unspecified whether esophagitis present  -     CBC Auto Differential; Future  -     Magnesium; Future    9. Elevated BP without diagnosis of hypertension    10. Abnormal bowel habits    11. Vitamin D deficiency  -     Vitamin D,25-Hydroxy; Future    12. Class 1 obesity due to excess calories with serious comorbidity and body mass index (BMI) of 33.0 to 33.9 in adult    13. Family history of thyroid disease  -     Ambulatory Referral to Endocrinology    Other orders  -     Fluzone >6 Months (1323-1529)             Follow Up   No follow-ups on file.  Patient was given instructions and counseling regarding her condition or for health maintenance advice. Please see specific information pulled into the AVS if appropriate.     Transcribed from ambient dictation for Anna Kenney MD by Varsha Ayala.  10/05/23   12:31 EDT    Patient or patient representative verbalized consent to the visit recording.  I have personally performed the services described in this document as transcribed by the above individual, and it is both accurate and complete.

## 2023-10-05 NOTE — PATIENT INSTRUCTIONS
Health Maintenance Due   Topic Date Due    ANNUAL PHYSICAL  07/19/2023    LIPID PANEL  07/20/2023    INFLUENZA VACCINE  08/01/2023    PAP SMEAR  08/20/2023    COVID-19 Vaccine (3 - 2023-24 season) 09/01/2023    12 hour fast for labs    Patient to call insurance and see what psychiatrist is covered by her insurance and let us know where to send referral

## 2023-10-24 RX ORDER — ESCITALOPRAM OXALATE 20 MG/1
20 TABLET ORAL DAILY
Qty: 90 TABLET | Refills: 0 | Status: SHIPPED | OUTPATIENT
Start: 2023-10-24

## 2023-10-30 ENCOUNTER — TELEPHONE (OUTPATIENT)
Dept: FAMILY MEDICINE CLINIC | Facility: CLINIC | Age: 23
End: 2023-10-30

## 2023-10-30 NOTE — TELEPHONE ENCOUNTER
I would advise that she get rechecked here at the clinic either by myself or Yue and that she not drive until we come up with a cause for this.  I do not want her falling asleep at the wheel.  Has she ever been told that she had obstructive sleep apnea?  Has she ever had anything like a seizure?

## 2023-10-30 NOTE — TELEPHONE ENCOUNTER
She has not ever been told that she had obstructive sleep apnea and no seizures.  Scheduled an appointment for this week.

## 2023-10-30 NOTE — TELEPHONE ENCOUNTER
Caller: Gregory Baez    Relationship to patient: Self    Best call back number: [phone]    Where are you experiencing symptoms:   GREGORY WAS DRIVING THIS MORNING AND PATIENT GOT VERY TIRED AND STARTED NODDING OFF ALL THE SUDDEN.     How long have you been experiencing symptoms: THIS MORNING    When have you experienced or been treated for these symptoms before: GREGORY HAS PASSED OUT ABOUT 2 YEARS AGO AND FELT LIKE PASSING OUT SEVERAL TIMES    Have you had any recent surgeries, procedures or injections: [x] Yes  [] No  FLU SHOT 10/5/2023      If a prescription is needed, what is your preferred pharmacy:   [facility]/pharmacy #[id number] - [place], [place] - [address] - [phone]  - [phone]  [address]  [place] [address]  Phone: [phone] Fax: [phone]

## 2023-11-02 NOTE — PATIENT INSTRUCTIONS
Health Maintenance Due   Topic Date Due    LIPID PANEL  07/20/2023    PAP SMEAR  08/20/2023    BMI FOLLOWUP  08/30/2023    COVID-19 Vaccine (3 - 2023-24 season) 09/01/2023    Don't drive till dizziness is explained.    Drink caffiene beverage before operating hazardous machinery and if tired still don't operate.  If fever or other signs of infection, bleeding, pain with urination calllo or go to ER

## 2023-11-03 ENCOUNTER — OFFICE VISIT (OUTPATIENT)
Dept: FAMILY MEDICINE CLINIC | Facility: CLINIC | Age: 23
End: 2023-11-03
Payer: COMMERCIAL

## 2023-11-03 VITALS
HEART RATE: 80 BPM | OXYGEN SATURATION: 98 % | WEIGHT: 196 LBS | HEIGHT: 64 IN | SYSTOLIC BLOOD PRESSURE: 114 MMHG | BODY MASS INDEX: 33.46 KG/M2 | DIASTOLIC BLOOD PRESSURE: 78 MMHG

## 2023-11-03 DIAGNOSIS — E55.9 VITAMIN D DEFICIENCY: ICD-10-CM

## 2023-11-03 DIAGNOSIS — R55 VASOVAGAL SYNCOPE: ICD-10-CM

## 2023-11-03 DIAGNOSIS — K21.9 GASTROESOPHAGEAL REFLUX DISEASE, UNSPECIFIED WHETHER ESOPHAGITIS PRESENT: ICD-10-CM

## 2023-11-03 DIAGNOSIS — R00.2 PALPITATIONS: ICD-10-CM

## 2023-11-03 DIAGNOSIS — Z12.4 SCREENING FOR CERVICAL CANCER: Primary | ICD-10-CM

## 2023-11-03 DIAGNOSIS — R87.612 LOW GRADE SQUAMOUS INTRAEPITHELIAL LESION ON CYTOLOGIC SMEAR OF CERVIX (LGSIL): ICD-10-CM

## 2023-11-03 DIAGNOSIS — E78.49 OTHER HYPERLIPIDEMIA: ICD-10-CM

## 2023-11-03 DIAGNOSIS — R42 DIZZINESS: ICD-10-CM

## 2023-11-03 DIAGNOSIS — F33.1 MODERATE EPISODE OF RECURRENT MAJOR DEPRESSIVE DISORDER: ICD-10-CM

## 2023-11-03 DIAGNOSIS — E66.09 CLASS 1 OBESITY DUE TO EXCESS CALORIES WITH SERIOUS COMORBIDITY AND BODY MASS INDEX (BMI) OF 33.0 TO 33.9 IN ADULT: ICD-10-CM

## 2023-11-03 DIAGNOSIS — R03.0 ELEVATED BP WITHOUT DIAGNOSIS OF HYPERTENSION: ICD-10-CM

## 2023-11-03 LAB
25(OH)D3 SERPL-MCNC: 25.9 NG/ML (ref 30–100)
ALBUMIN SERPL-MCNC: 4.6 G/DL (ref 3.5–5.2)
ALBUMIN/GLOB SERPL: 1.7 G/DL
ALP SERPL-CCNC: 58 U/L (ref 39–117)
ALT SERPL W P-5'-P-CCNC: 31 U/L (ref 1–33)
ANION GAP SERPL CALCULATED.3IONS-SCNC: 10 MMOL/L (ref 5–15)
AST SERPL-CCNC: 23 U/L (ref 1–32)
BASOPHILS # BLD AUTO: 0.06 10*3/MM3 (ref 0–0.2)
BASOPHILS NFR BLD AUTO: 0.9 % (ref 0–1.5)
BILIRUB SERPL-MCNC: 0.3 MG/DL (ref 0–1.2)
BUN SERPL-MCNC: 9 MG/DL (ref 6–20)
BUN/CREAT SERPL: 13.2 (ref 7–25)
CALCIUM SPEC-SCNC: 9.4 MG/DL (ref 8.6–10.5)
CHLORIDE SERPL-SCNC: 101 MMOL/L (ref 98–107)
CHOLEST SERPL-MCNC: 201 MG/DL (ref 0–200)
CO2 SERPL-SCNC: 27 MMOL/L (ref 22–29)
CREAT SERPL-MCNC: 0.68 MG/DL (ref 0.57–1)
DEPRECATED RDW RBC AUTO: 39.9 FL (ref 37–54)
EGFRCR SERPLBLD CKD-EPI 2021: 124.1 ML/MIN/1.73
EOSINOPHIL # BLD AUTO: 0.07 10*3/MM3 (ref 0–0.4)
EOSINOPHIL NFR BLD AUTO: 1.1 % (ref 0.3–6.2)
ERYTHROCYTE [DISTWIDTH] IN BLOOD BY AUTOMATED COUNT: 13 % (ref 12.3–15.4)
GLOBULIN UR ELPH-MCNC: 2.7 GM/DL
GLUCOSE SERPL-MCNC: 79 MG/DL (ref 65–99)
HCT VFR BLD AUTO: 41.9 % (ref 34–46.6)
HDLC SERPL-MCNC: 42 MG/DL (ref 40–60)
HGB BLD-MCNC: 14.3 G/DL (ref 12–15.9)
IMM GRANULOCYTES # BLD AUTO: 0.01 10*3/MM3 (ref 0–0.05)
IMM GRANULOCYTES NFR BLD AUTO: 0.2 % (ref 0–0.5)
LDLC SERPL CALC-MCNC: 128 MG/DL (ref 0–100)
LDLC/HDLC SERPL: 2.97 {RATIO}
LYMPHOCYTES # BLD AUTO: 2.73 10*3/MM3 (ref 0.7–3.1)
LYMPHOCYTES NFR BLD AUTO: 41.7 % (ref 19.6–45.3)
MAGNESIUM SERPL-MCNC: 2.2 MG/DL (ref 1.6–2.6)
MCH RBC QN AUTO: 29.6 PG (ref 26.6–33)
MCHC RBC AUTO-ENTMCNC: 34.1 G/DL (ref 31.5–35.7)
MCV RBC AUTO: 86.7 FL (ref 79–97)
MONOCYTES # BLD AUTO: 0.46 10*3/MM3 (ref 0.1–0.9)
MONOCYTES NFR BLD AUTO: 7 % (ref 5–12)
NEUTROPHILS NFR BLD AUTO: 3.21 10*3/MM3 (ref 1.7–7)
NEUTROPHILS NFR BLD AUTO: 49.1 % (ref 42.7–76)
NRBC BLD AUTO-RTO: 0 /100 WBC (ref 0–0.2)
PLATELET # BLD AUTO: 287 10*3/MM3 (ref 140–450)
PMV BLD AUTO: 12.5 FL (ref 6–12)
POTASSIUM SERPL-SCNC: 4.1 MMOL/L (ref 3.5–5.2)
PROT SERPL-MCNC: 7.3 G/DL (ref 6–8.5)
RBC # BLD AUTO: 4.83 10*6/MM3 (ref 3.77–5.28)
SODIUM SERPL-SCNC: 138 MMOL/L (ref 136–145)
TRIGL SERPL-MCNC: 171 MG/DL (ref 0–150)
TSH SERPL DL<=0.05 MIU/L-ACNC: 1.5 UIU/ML (ref 0.27–4.2)
VIT B12 BLD-MCNC: 618 PG/ML (ref 211–946)
VLDLC SERPL-MCNC: 31 MG/DL (ref 5–40)
WBC NRBC COR # BLD: 6.54 10*3/MM3 (ref 3.4–10.8)

## 2023-11-03 PROCEDURE — 82607 VITAMIN B-12: CPT | Performed by: PREVENTIVE MEDICINE

## 2023-11-03 PROCEDURE — 82306 VITAMIN D 25 HYDROXY: CPT | Performed by: PREVENTIVE MEDICINE

## 2023-11-03 PROCEDURE — 80061 LIPID PANEL: CPT | Performed by: PREVENTIVE MEDICINE

## 2023-11-03 PROCEDURE — 80050 GENERAL HEALTH PANEL: CPT | Performed by: PREVENTIVE MEDICINE

## 2023-11-03 PROCEDURE — 83735 ASSAY OF MAGNESIUM: CPT | Performed by: PREVENTIVE MEDICINE

## 2023-11-03 NOTE — PROGRESS NOTES
"Subjective   Gregory Baez is a 23 y.o. female presents for   Chief Complaint   Patient presents with    Loss of Consciousness     Felt like she was so tired couldn't keep her eyes open.  Didn't actually faint.  Happened on Monday.  Has happened on more then one occasion.  Patient is fasting.       Health Maintenance Due   Topic Date Due    LIPID PANEL  07/20/2023    PAP SMEAR  08/20/2023    BMI FOLLOWUP  08/30/2023    COVID-19 Vaccine (3 - 2023-24 season) 09/01/2023   Gregory Baez presents for screening for cervical cancer, dizziness, vasovagal sympathy, syncope by history, palpitations, hyperlipidemia, moderate episodes of recurrent major depressive disorder, low grade squamous intraepithelial lesions on Pap smear, GERD, elevated bloos pressure without diagnosis of hypertension, class 1 obesity with serious comorbidities, and BMI of 33. She has consented to the use of TAHMINA.     The patient began to feel faint on 10/30/2023 at 530 AM. She notes she took a shower and drank a bottle of water before leaving her home, per usual. As she began to drive in traffic, her eyes began to become heavy and felt fatigued. She rolled her window down, turned her radio volume up, and \"slapped herself in the face\" in order to aide her symptoms. Her son was in the car with her during this time. She states she was \"almost ready to pull off somewhere to shut her eyes\". She states she does not drink coffee until she arrives to work at 8 AM. She denies experiencing this in the past. She states she slept well the night on 10/29/2023, and denies snoring or waking with dry mouth. She denies getting vaccinations during this time. When the patient arrived at work, she states she felt \"loopy\". She denies possibility of ingesting any sort of substance.     On 11/02/2023, the patient state she \"got to a point\" where she was \"really tired\" while at work. She states she could feel herself falling asleep. She then walked away from her desk to " "\"take a breath of fresh air\", and drink some water. After this, she was fine.     The patient is experincing her current menstrual cycle. She denies this being abnormal. She is not currently using any form of contraception. She reports unprotected sexual intercourse since her last menstrual period. She took an at home pregnancy test, yielding negative results. She denies a past history of tubal pregnancy. She denies chest pain, chest tightness, palpitations, dysuria. She reports diarrhea on 10/28/2023. She did drink alcohol on 10/28/2023, but denies being hungover on 10/29/2023. She denies current symptoms. She had drank coffee today, 11/03/2023. She is currently using tampons, but did not begin using them until 10/31/2023.     She denies change in hearing, headache, seizure, syncope, trouble swallowing, indigestion.     The patient is allergic to PENICILLIN.       Vitals:    11/03/23 1323 11/03/23 1324 11/03/23 1325   BP: 126/82 119/83 114/78   BP Location: Right arm Left arm Left arm   Patient Position: Sitting Sitting Standing   Cuff Size: Adult Adult Adult   Pulse: 67 67 80   SpO2: 98%     Weight: 88.9 kg (196 lb)     Height: 162.6 cm (64.02\")       Body mass index is 33.62 kg/m².    Current Outpatient Medications on File Prior to Visit   Medication Sig Dispense Refill    Blood Pressure Monitoring (Blood Pressure Cuff) misc 1 each 2 (Two) Times a Day. 1 each 0    busPIRone (BUSPAR) 5 MG tablet Take 1 tablet by mouth 3 (Three) Times a Day. 90 tablet 3    escitalopram (LEXAPRO) 20 MG tablet Take 1 tablet by mouth Daily. 90 tablet 0     No current facility-administered medications on file prior to visit.       The following portions of the patient's history were reviewed and updated as appropriate: allergies, current medications, past family history, past medical history, past social history, past surgical history, and problem list.    Review of Systems   Constitutional:  Positive for fatigue.   HENT: Negative.   "   Eyes: Negative.    Respiratory: Negative.     Cardiovascular: Negative.    Gastrointestinal:  Positive for diarrhea.   Endocrine: Negative.    Genitourinary: Negative.    Musculoskeletal: Negative.    Allergic/Immunologic: Negative.    Neurological:  Positive for dizziness.   Psychiatric/Behavioral: Negative.         Objective   Physical Exam  Vitals reviewed.   Constitutional:       General: She is not in acute distress.     Appearance: Normal appearance. She is well-developed. She is not ill-appearing or toxic-appearing.   HENT:      Head: Normocephalic and atraumatic.      Right Ear: Tympanic membrane, ear canal and external ear normal.      Left Ear: Tympanic membrane, ear canal and external ear normal.      Nose: Nose normal.   Eyes:      Extraocular Movements: Extraocular movements intact.      Conjunctiva/sclera: Conjunctivae normal.      Pupils: Pupils are equal, round, and reactive to light.   Cardiovascular:      Rate and Rhythm: Normal rate and regular rhythm.      Heart sounds: Normal heart sounds.   Pulmonary:      Effort: Pulmonary effort is normal.      Breath sounds: Normal breath sounds.   Abdominal:      General: Bowel sounds are normal. There is no distension.      Palpations: Abdomen is soft. There is no mass.      Tenderness: There is no abdominal tenderness.   Musculoskeletal:         General: Normal range of motion.      Cervical back: Neck supple.   Skin:     General: Skin is warm.   Neurological:      General: No focal deficit present.      Mental Status: She is alert and oriented to person, place, and time.   Psychiatric:         Mood and Affect: Mood normal.         Behavior: Behavior normal.         PHQ-9 Total Score:      Assessment & Plan   Diagnoses and all orders for this visit:    1. Screening for cervical cancer (Primary)    2. Dizziness    3. Vasovagal syncope    4. Palpitations  -     TSH    5. Other hyperlipidemia  -     Comprehensive Metabolic Panel  -     Lipid Panel    6.  Moderate episode of recurrent major depressive disorder  -     Vitamin B12    7. Low grade squamous intraepithelial lesion on cytologic smear of cervix (LGSIL)    8. Gastroesophageal reflux disease, unspecified whether esophagitis present  -     CBC Auto Differential  -     Magnesium    9. Elevated BP without diagnosis of hypertension    10. Class 1 obesity due to excess calories with serious comorbidity and body mass index (BMI) of 33.0 to 33.9 in adult    11. Vitamin D deficiency  -     Vitamin D,25-Hydroxy      I will place an order for labs. I counseled patient on red flag indicators to seek emergency care. I counseled patient to use caution while driving, and to drink caffeine before driving. I advised patient to monitor fever.     Patient Instructions     Health Maintenance Due   Topic Date Due    LIPID PANEL  07/20/2023    PAP SMEAR  08/20/2023    BMI FOLLOWUP  08/30/2023    COVID-19 Vaccine (3 - 2023-24 season) 09/01/2023    Don't drive till dizziness is explained.    Drink caffiene beverage before operating hazardous machinery and if tired still don't operate.  If fever or other signs of infection, bleeding, pain with urination calllo or go to ER       Transcribed from ambient dictation for Anna Kenney MD by Kelly Singleton.  11/03/23   14:07 EDT    Patient or patient representative verbalized consent to the visit recording.  I have personally performed the services described in this document as transcribed by the above individual, and it is both accurate and complete.

## 2023-11-03 NOTE — PROGRESS NOTES
Venipuncture performed on Right Arm by Janeth Weaver MA  with good hemostasis. Patient tolerated well. 11/03/23

## 2023-11-03 NOTE — LETTER
November 3, 2023     Patient: Gregory Baez   YOB: 2000   Date of Visit: 11/3/2023       To Whom It May Concern:    It is my medical opinion that Gregory Baez may return to work in one days.            Sincerely,        Anna Kenney MD    CC: No Recipients

## 2023-11-05 NOTE — PROGRESS NOTES
Total and bad cholesterol are high so avoid saturated fats and increase your walking vitamin D is also slightly low.  There is no cause here for extra fatigue so we will make sure that you consider a sleep study and we can have you follow-up with cardiologist if symptoms persist

## 2023-11-06 ENCOUNTER — TELEPHONE (OUTPATIENT)
Dept: FAMILY MEDICINE CLINIC | Facility: CLINIC | Age: 23
End: 2023-11-06
Payer: COMMERCIAL

## 2023-11-06 DIAGNOSIS — R06.83 SNORING: ICD-10-CM

## 2023-11-06 DIAGNOSIS — R53.83 FATIGUE, UNSPECIFIED TYPE: Primary | ICD-10-CM

## 2023-11-06 NOTE — TELEPHONE ENCOUNTER
"Relay     \"Total and bad cholesterol are high so avoid saturated fats and increase your walking vitamin D is also slightly low.  There is no cause here for extra fatigue so we will make sure that you consider a sleep study and we can have you follow-up with cardiologist if symptoms persist\"                "

## 2023-11-06 NOTE — TELEPHONE ENCOUNTER
----- Message from Anna Kenney MD sent at 11/5/2023  9:39 AM EST -----  Total and bad cholesterol are high so avoid saturated fats and increase your walking vitamin D is also slightly low.  There is no cause here for extra fatigue so we will make sure that you consider a sleep study and we can have you follow-up with cardiologist if symptoms persist

## 2023-11-22 NOTE — PROGRESS NOTES
"Chief Complaint  Sleep Apnea    Subjective          Gregory Baez presents to [facility]UR[facility]  History of Present Illness    The patient c/o daytime sleepiness issues:  excessive daytime sleepiness, .      The patient complains of snoring loud in all sleeping positions.     The patient complains of Leg symptoms:  no .     The patient complains of problems with insomnia:  difficulty staying asleep. Not aware of why, may be awake for an hour or so then goes back to sleep     The patient reports history of these childhood sleep problems:  none    Sleep schedule: Bedtime: 9 pm , gets out of bed at 5:30-6 am, sleep latency: couple minutes, Gets about 6 hours of sleep.    Works at [employer]     EPWORTH SLEEPINESS SCALE  Sitting and reading 3 WatchingTV 1  Sitting, inactive, in a public place 1  As a passenger in a car for 1 hour w/o a break  2  Lying down to rest in the afternoon  3  Sitting and talking to someone  0  Sitting quietly after a lunch  1  In a car, while stopped for traffic or a light  0  Total 11    Review of Systems   Constitutional:  Positive for activity change.   Cardiovascular:  Positive for palpitations.   Neurological:  Positive for dizziness, weakness and light-headedness.   Psychiatric/Behavioral:  Positive for agitation, decreased concentration and sleep disturbance. The patient is nervous/anxious.    All other systems reviewed and are negative.   There is no history of hypnagogic hallucinations, sleep paralysis or cataplexy.    Objective   Vital Signs:   /85   Pulse 70   Ht 162.6 cm (64.02\")   Wt 90.3 kg (199 lb)   BMI 34.14 kg/m²     Physical Exam  Vitals reviewed.   Constitutional:       Appearance: Normal appearance.   HENT:      Nose: Nose normal.   Eyes:      Conjunctiva/sclera: Conjunctivae normal.   Cardiovascular:      Rate and Rhythm: Normal rate.   Pulmonary:      Effort: Pulmonary effort is normal. No respiratory distress.   Neurological:    "   General: No focal deficit present.      Mental Status: She is alert and oriented to person, place, and time.   Psychiatric:         Mood and Affect: Mood normal.        Result Review :                 Assessment and Plan    Diagnoses and all orders for this visit:    1. ALIYA (obstructive sleep apnea) (Primary)  -     zolpidem (AMBIEN) 5 MG tablet; One tab po prior to sleep test  Dispense: 1 tablet; Refill: 0    Probable aliya with witnessed apnea during sleep, fatigue and snoring  Hst and treat with pap if indicated    Follow Up   Return for Follow Up visit 30 to 90 days after obtaining PAP.  Patient was given instructions and counseling regarding her condition or for health maintenance advice. Please see specific information pulled into the AVS if appropriate.       This document has been electronically signed by Joseph Seipel, MD on November 28, 2023 17:17 EST

## 2023-11-28 ENCOUNTER — OFFICE VISIT (OUTPATIENT)
Dept: NEUROLOGY | Facility: CLINIC | Age: 23
End: 2023-11-28
Payer: COMMERCIAL

## 2023-11-28 VITALS
HEART RATE: 70 BPM | WEIGHT: 199 LBS | SYSTOLIC BLOOD PRESSURE: 124 MMHG | DIASTOLIC BLOOD PRESSURE: 85 MMHG | BODY MASS INDEX: 33.97 KG/M2 | HEIGHT: 64 IN

## 2023-11-28 DIAGNOSIS — G47.33 OSA (OBSTRUCTIVE SLEEP APNEA): Primary | ICD-10-CM

## 2023-11-28 PROCEDURE — 99204 OFFICE O/P NEW MOD 45 MIN: CPT | Performed by: PSYCHIATRY & NEUROLOGY

## 2023-11-28 RX ORDER — ZOLPIDEM TARTRATE 5 MG/1
TABLET ORAL
Qty: 1 TABLET | Refills: 0 | Status: SHIPPED | OUTPATIENT
Start: 2023-11-28 | End: 2023-11-29

## 2023-11-29 ENCOUNTER — LAB (OUTPATIENT)
Dept: LAB | Facility: HOSPITAL | Age: 23
End: 2023-11-29
Payer: COMMERCIAL

## 2023-11-29 ENCOUNTER — OFFICE VISIT (OUTPATIENT)
Dept: ENDOCRINOLOGY | Facility: CLINIC | Age: 23
End: 2023-11-29
Payer: COMMERCIAL

## 2023-11-29 VITALS
DIASTOLIC BLOOD PRESSURE: 82 MMHG | BODY MASS INDEX: 33.8 KG/M2 | WEIGHT: 198 LBS | SYSTOLIC BLOOD PRESSURE: 120 MMHG | HEIGHT: 64 IN | OXYGEN SATURATION: 97 % | HEART RATE: 68 BPM

## 2023-11-29 DIAGNOSIS — R94.6 ABNORMAL THYROID FUNCTION TEST: Primary | ICD-10-CM

## 2023-11-29 DIAGNOSIS — Z83.49 FAMILY HISTORY OF THYROID DISEASE: ICD-10-CM

## 2023-11-29 DIAGNOSIS — R94.6 ABNORMAL THYROID FUNCTION TEST: ICD-10-CM

## 2023-11-29 DIAGNOSIS — E66.9 CLASS 1 OBESITY WITHOUT SERIOUS COMORBIDITY WITH BODY MASS INDEX (BMI) OF 33.0 TO 33.9 IN ADULT, UNSPECIFIED OBESITY TYPE: ICD-10-CM

## 2023-11-29 LAB
T4 FREE SERPL-MCNC: 0.94 NG/DL (ref 0.93–1.7)
TSH SERPL DL<=0.05 MIU/L-ACNC: 1.29 UIU/ML (ref 0.27–4.2)

## 2023-11-29 PROCEDURE — 84439 ASSAY OF FREE THYROXINE: CPT

## 2023-11-29 PROCEDURE — 99204 OFFICE O/P NEW MOD 45 MIN: CPT | Performed by: INTERNAL MEDICINE

## 2023-11-29 PROCEDURE — 36415 COLL VENOUS BLD VENIPUNCTURE: CPT

## 2023-11-29 PROCEDURE — 84443 ASSAY THYROID STIM HORMONE: CPT

## 2023-11-29 NOTE — PATIENT INSTRUCTIONS
Please,    - Get blood work done today.  - Depending on your blood work levels we will plan accordingly.    Thank you for your visit today.    If you have any questions or concerns please feel free to reach out of the office.

## 2023-11-29 NOTE — PROGRESS NOTES
-----------------------------------------------------------------  ENDOCRINE CLINIC NOTE  -----------------------------------------------------------------        PATIENT NAME: Gregory Baez  PATIENT : 2000 AGE: 23 y.o.  MRN NUMBER: [id number]  PRIMARY CARE: Anna Kenney MD    ==========================================================================    CHIEF COMPLAINT: Family history of abnormal thyroid function    DATE OF SERVICE: 23         ==========================================================================    HPI / SUBJECTIVE    23 y.o. female is seen in the clinic today for evaluation of thyroid function.  Patient have a family history significant for abnormal thyroid function.  She has been persistently suffering from chronic fatigue and tiredness along with inability to lose weight.  Patient is also having significant insomnia which she describes as trouble initiating the sleep and also maintaining the sleep.  Patient at multiple times wakes up and unable to go back to sleep.  Patient also have multiple syncopal episodes or near syncopal episodes which has been worked up by primary care.  Patient denied any subjective neck swelling or exposure to radiation.  Patient is a first shift .    ==========================================================================                                                PAST MEDICAL HISTORY    Past Medical History:   Diagnosis Date    Abnormal bowel habits     Anxiety     Asthma     childhood.    Depression     Dizziness     GERD (gastroesophageal reflux disease)     PTSD (post-traumatic stress disorder)        ==========================================================================    PAST SURGICAL HISTORY    Past Surgical History:   Procedure Laterality Date    ADENOIDECTOMY      COLONOSCOPY N/A 12/3/2020    Procedure: COLONOSCOPY with random colon biopsy;  Surgeon: Bear Caraballo MD;  Location: [facility] ENDOSCOPY;   "Service: Gastroenterology;  Laterality: N/A;  Post op: normal colon, random comon biopsy    DENTAL PROCEDURE      ENDOSCOPY N/A 12/3/2020    Procedure: ESOPHAGOGASTRODUODENOSCOPY with biopsy x2;  Surgeon: Bear Caraballo MD;  Location: [facility] ENDOSCOPY;  Service: Gastroenterology;  Laterality: N/A;  post op: duodenitis, gastritis, biopsy x2    MYRINGOTOMY W/ TUBES      TONSILLECTOMY         ==========================================================================    FAMILY HISTORY    Family History   Problem Relation Age of Onset    Hypertension Father     Diabetes Father     Hyperlipidemia Father     Alcohol abuse Father     Hypertension Maternal Grandmother     Hyperlipidemia Maternal Grandmother     Stroke Maternal Grandmother     Alcohol abuse Maternal Grandfather     Stroke Maternal Grandfather     Hypertension Paternal Grandmother     Diabetes Paternal Grandmother     Hyperlipidemia Paternal Grandmother     Heart disease Paternal Grandmother     Cancer Paternal Grandmother     Hypertension Paternal Grandfather     Diabetes Paternal Grandfather     Hyperlipidemia Paternal Grandfather     Heart disease Paternal Grandfather     Stroke Paternal Grandfather        ==========================================================================    SOCIAL HISTORY    Social History     Socioeconomic History    Marital status: Single   Tobacco Use    Smoking status: Former     Packs/day: 0.25     Years: 3.00     Additional pack years: 0.00     Total pack years: 0.75     Types: Cigarettes     Start date: 2017     Quit date: 2020     Years since quittin.9     Passive exposure: Past    Smokeless tobacco: Former    Tobacco comments:     weaning \"hasn't had cigarette in 3 days\"      Vaping - 1 vap cartridge last 2-3 weeks, nicotine   Vaping Use    Vaping Use: Former    Quit date: 3/10/2023    Substances: Nicotine   Substance and Sexual Activity    Alcohol use: Not Currently     Alcohol/week: 2.0 - 3.0 standard " "drinks of alcohol     Types: 2 - 3 Glasses of wine per week     Comment: socially - 1 weekend out of the month    Drug use: Yes     Frequency: 4.0 times per week     Types: Marijuana     Comment: occasionally - quit when found she was pregnant    Sexual activity: Yes     Partners: Male     Birth control/protection: Condom, None       ==========================================================================    MEDICATIONS      Current Outpatient Medications:     busPIRone (BUSPAR) 5 MG tablet, Take 1 tablet by mouth 3 (Three) Times a Day., Disp: 90 tablet, Rfl: 3    escitalopram (LEXAPRO) 20 MG tablet, Take 1 tablet by mouth Daily., Disp: 90 tablet, Rfl: 0    ==========================================================================    ALLERGIES    Allergies   Allergen Reactions    Penicillins Hives and Nausea And Vomiting       ==========================================================================    OBJECTIVE    Vitals:    11/29/23 0800   BP: 120/82   Pulse: 68   SpO2: 97%     Body mass index is 33.97 kg/m².     General: Alert, cooperative, no acute distress  Thyroid:  no enlargement/tenderness/palpable nodules  Lungs: Clear to auscultation bilaterally, respirations unlabored  Heart: Regular rate and rhythm, S1 and S2 normal, no murmur, rub or gallop  Abdomen: Soft, NT, ND and Bowel sounds Positive  Extremities:  Extremities normal, atraumatic, no cyanosis or edema    ==========================================================================    LAB EVALUATION    Lab Results   Component Value Date    GLUCOSE 79 11/03/2023    BUN 9 11/03/2023    CREATININE 0.68 11/03/2023    EGFRIFNONA 147 08/03/2021    BCR 13.2 11/03/2023    K 4.1 11/03/2023    CO2 27.0 11/03/2023    CALCIUM 9.4 11/03/2023    ALBUMIN 4.6 11/03/2023    AST 23 11/03/2023    ALT 31 11/03/2023    CHOL 201 (H) 11/03/2023    TRIG 171 (H) 11/03/2023    HDL 42 11/03/2023     (H) 11/03/2023     No results found for: \"HGBA1C\"  Lab Results " "  Component Value Date    CREATININE 0.68 11/03/2023     Lab Results   Component Value Date    TSH 1.500 11/03/2023       ==========================================================================    ASSESSMENT AND PLAN    #Family history of abnormal thyroid function  #Multiple constitutional symptoms  #Abnormal thyroid function  - Discussed with patient in detail about pathophysiology of thyroid gland to which she verbalized understanding  - Patient had multiple TSH levels done and all within acceptable limit  - Endocrine is consulted for evaluation for thyroid function, will order TSH and free T4 levels  - If thyroid function is within acceptable limit patient can continue to follow with the primary care  - If there is an abnormal finding on the thyroid function we will plan further therapy accordingly    #Obesity with BMI of 33.97      Thank you for courtesy of consultation.    Return to clinic: as needed    Entire assessment and plan was discussed and counseled the patient in detail to which patient verbalized understanding and agreed with care.  Answered all queries and concerns.    This note was created using voice recognition software and is inherently subject to errors including those of syntax and \"sound-alike\" substitutions which may escape proofreading.  In such instances, original meaning may be extrapolated by contextual derivation.    Note: Portions of this note may have been copied from previous notes but documentation have been reviewed and edited as necessary to support clinical decision making for today's visit.    ==========================================================================    INFORMATION PROVIDED TO PATIENT    Patient Instructions   Please,    - Get blood work done today.  - Depending on your blood work levels we will plan accordingly.    Thank you for your visit today.    If you have any questions or concerns please feel free to reach out of the office. "       ==========================================================================  Darian Kerns MD  Department of Endocrine, Diabetes and Metabolism  [facility]  [place], [place]  ==========================================================================

## 2023-12-01 ENCOUNTER — PATIENT ROUNDING (BHMG ONLY) (OUTPATIENT)
Dept: ENDOCRINOLOGY | Facility: CLINIC | Age: 23
End: 2023-12-01
Payer: COMMERCIAL

## 2023-12-01 ENCOUNTER — PATIENT ROUNDING (BHMG ONLY) (OUTPATIENT)
Dept: NEUROLOGY | Facility: CLINIC | Age: 23
End: 2023-12-01
Payer: COMMERCIAL

## 2023-12-01 NOTE — PROGRESS NOTES
December 1, 2023    Hello, may I speak with Gregory Baez?    My name is An      I am  with [facility]  [address] [place] [address].    Before we get started may I verify your date of birth? 2000    I am calling to officially welcome you to our practice and ask about your recent visit. Is this a good time to talk? yes    Tell me about your visit with us. What things went well?  it was okay       We're always looking for ways to make our patients' experiences even better. Do you have recommendations on ways we may improve?  no    Overall were you satisfied with your first visit to our practice? yes       I appreciate you taking the time to speak with me today. Is there anything else I can do for you? no      Thank you, and have a great day.

## 2023-12-11 ENCOUNTER — TELEPHONE (OUTPATIENT)
Dept: NEUROLOGY | Facility: CLINIC | Age: 23
End: 2023-12-11
Payer: COMMERCIAL

## 2023-12-11 NOTE — TELEPHONE ENCOUNTER
Provider: SEIPEL    Caller: FRANCINE    Relationship to Patient: SELF    Phone Number: [phone]    Reason for Call:PATIENT WAS TOLD THAT SHE WOULD RECEIVE A CALL REGARDING HER SLEEP STUDY AND SLEEP EQUIPMENT. PLEASE CONTACT PATIENT.    PLEASE REVIEW      THANK YOU

## 2023-12-15 DIAGNOSIS — G47.33 OSA (OBSTRUCTIVE SLEEP APNEA): ICD-10-CM

## 2023-12-15 RX ORDER — ZOLPIDEM TARTRATE 5 MG/1
TABLET ORAL
Qty: 1 TABLET | Refills: 0 | Status: SHIPPED | OUTPATIENT
Start: 2023-12-15

## 2023-12-21 ENCOUNTER — HOSPITAL ENCOUNTER (OUTPATIENT)
Dept: SLEEP MEDICINE | Facility: HOSPITAL | Age: 23
End: 2023-12-21
Payer: COMMERCIAL

## 2023-12-21 DIAGNOSIS — G47.33 OSA (OBSTRUCTIVE SLEEP APNEA): ICD-10-CM

## 2023-12-21 PROCEDURE — 95806 SLEEP STUDY UNATT&RESP EFFT: CPT

## 2023-12-31 NOTE — PATIENT INSTRUCTIONS
Health Maintenance Due   Topic Date Due    PAP SMEAR  08/20/2023    BMI FOLLOWUP  08/30/2023    COVID-19 Vaccine (3 - 2023-24 season) 09/01/2023    Call here if not heard from us about when OB Recheck by 1/12/24 Patient to call insurance and see if they cover [facility] as she has been on 4 meds and not working

## 2024-01-02 ENCOUNTER — OFFICE VISIT (OUTPATIENT)
Dept: FAMILY MEDICINE CLINIC | Facility: CLINIC | Age: 24
End: 2024-01-02
Payer: COMMERCIAL

## 2024-01-02 VITALS
HEART RATE: 77 BPM | WEIGHT: 194.8 LBS | SYSTOLIC BLOOD PRESSURE: 115 MMHG | BODY MASS INDEX: 33.26 KG/M2 | TEMPERATURE: 98.4 F | DIASTOLIC BLOOD PRESSURE: 81 MMHG | HEIGHT: 64 IN | OXYGEN SATURATION: 97 %

## 2024-01-02 DIAGNOSIS — R03.0 ELEVATED BP WITHOUT DIAGNOSIS OF HYPERTENSION: ICD-10-CM

## 2024-01-02 DIAGNOSIS — R00.2 PALPITATIONS: ICD-10-CM

## 2024-01-02 DIAGNOSIS — F41.9 ANXIETY: ICD-10-CM

## 2024-01-02 DIAGNOSIS — Z12.4 SCREENING FOR CERVICAL CANCER: Primary | ICD-10-CM

## 2024-01-02 DIAGNOSIS — E78.49 OTHER HYPERLIPIDEMIA: ICD-10-CM

## 2024-01-02 DIAGNOSIS — R55 VASOVAGAL SYNCOPE: ICD-10-CM

## 2024-01-02 DIAGNOSIS — K21.9 GASTROESOPHAGEAL REFLUX DISEASE, UNSPECIFIED WHETHER ESOPHAGITIS PRESENT: ICD-10-CM

## 2024-01-02 DIAGNOSIS — R87.612 LOW GRADE SQUAMOUS INTRAEPITHELIAL LESION ON CYTOLOGIC SMEAR OF CERVIX (LGSIL): ICD-10-CM

## 2024-01-02 DIAGNOSIS — F33.1 MODERATE EPISODE OF RECURRENT MAJOR DEPRESSIVE DISORDER: ICD-10-CM

## 2024-01-02 DIAGNOSIS — E66.09 CLASS 1 OBESITY DUE TO EXCESS CALORIES WITH SERIOUS COMORBIDITY AND BODY MASS INDEX (BMI) OF 33.0 TO 33.9 IN ADULT: ICD-10-CM

## 2024-01-02 DIAGNOSIS — R42 DIZZINESS: ICD-10-CM

## 2024-01-02 RX ORDER — BUSPIRONE HYDROCHLORIDE 7.5 MG/1
7.5 TABLET ORAL 3 TIMES DAILY
Qty: 90 TABLET | Refills: 1 | Status: SHIPPED | OUTPATIENT
Start: 2024-01-02

## 2024-01-02 NOTE — PROGRESS NOTES
Subjective   Gregory Baez is a 23 y.o. female presents for screening for cervical cancer, vasovagal syncope, palpitations, anxiety, GERD, dizziness, hyperlipidemia, low-grade squamous lesions of the cervix, elevated blood pressure without diagnosis of hypertension, moderate episode of recurrent major depression, and class I obesity with serious comorbidities with a body mass index of 33.    Chief Complaint   Patient presents with    bi polar     Needing new referral to different provider unable to go where we referred due to not being able to change providers within organization.  Has not checked with insurance to see who she can go to.  Ourbursts are getting worse.         Health Maintenance Due   Topic Date Due    Pneumococcal Vaccine 0-64 (1 of 2 - PCV) 09/12/2004    BMI FOLLOWUP  08/30/2023    COVID-19 Vaccine (3 - 2023-24 season) 09/01/2023       The patient is requesting a new behavioral health referral for a second opinion. She denies any SI or HI. She is taking buspirone 3 times daily, but she still has constant anxiety. She has been on Lexapro 20 mg for more than 6 months, and it is effective if she takes it in the morning instead of at night. Previously, she took Lexapro at night along with Caplyta and Vraylar, but they made her tired throughout the day. She has discontinued Caplyta and Vraylar even though it helped with her outbursts. Without the medication she feels like she is always running late. Patient has never had GeneSight testing. Vraylar made her feel more depressed. Caplyta caused drowsiness throughout the day and even when she took it at night.     Her last Pap smear was within the last year. Her last menses was at the beginning of 12/2023. She has had unprotected intercourse since then, and she is unsure of any chance of pregnancy.     She denies any current episodes of syncope or palpitations.     Her heartburn is well controlled.     She denies any dizzy episodes.     The patient is  "monitoring her saturated fat intake and has lost 5 pounds.    Her blood pressure is within normal range today.     The patient is allergic to PENICILLIN. She received her influenza vaccine on 10/05/2023. She is a non-smoker. Patient does not have asthma.       Vitals:    01/02/24 1058 01/02/24 1059   BP: 116/80 115/81   BP Location: Left arm Right arm   Patient Position: Sitting Sitting   Cuff Size: Adult Adult   Pulse: 63 77   Temp: 98.4 °F (36.9 °C)    TempSrc: Temporal    SpO2: 98% 97%   Weight: 88.4 kg (194 lb 12.8 oz)    Height: 162.6 cm (64.02\")      Body mass index is 33.42 kg/m².    Current Outpatient Medications on File Prior to Visit   Medication Sig Dispense Refill    escitalopram (LEXAPRO) 20 MG tablet Take 1 tablet by mouth Daily. 90 tablet 0    [DISCONTINUED] busPIRone (BUSPAR) 5 MG tablet Take 1 tablet by mouth 3 (Three) Times a Day. 90 tablet 3    [DISCONTINUED] zolpidem (AMBIEN) 5 MG tablet One tab po prior to sleep test (Patient not taking: Reported on 1/2/2024) 1 tablet 0     No current facility-administered medications on file prior to visit.       The following portions of the patient's history were reviewed and updated as appropriate: allergies, current medications, past family history, past medical history, past social history, past surgical history, and problem list.    Review of Systems   Cardiovascular:  Negative for palpitations.   Gastrointestinal:  Negative for diarrhea, nausea and vomiting.   Neurological:  Negative for dizziness and syncope.         Objective   Physical Exam  Cardiovascular:      Rate and Rhythm: Normal rate and regular rhythm.      Heart sounds: No murmur heard.  Pulmonary:      Breath sounds: Normal breath sounds. No wheezing, rhonchi or rales.   Abdominal:      General: Bowel sounds are normal. There is no distension.      Palpations: There is no mass.      Tenderness: There is no abdominal tenderness.      Comments: Examined in the seated position.  "   Musculoskeletal:      Right lower leg: No edema.      Left lower leg: No edema.         PHQ-9 Total Score: 17    Assessment & Plan   Diagnoses and all orders for this visit:    1. Screening for cervical cancer (Primary)  -     Ambulatory Referral to Gynecology    2. Vasovagal syncope    3. Palpitations  -     CBC Auto Differential; Future  -     Comprehensive Metabolic Panel; Future  -     Magnesium; Future  -     TSH; Future    4. Anxiety  -     Vitamin B12; Future  -     Ambulatory Referral to Psychiatry  -     Gene[facility]ight - Swab,; Future    5. Gastroesophageal reflux disease, unspecified whether esophagitis present    6. Dizziness    7. Other hyperlipidemia  -     Lipid Panel; Future    8. Low grade squamous intraepithelial lesion on cytologic smear of cervix (LGSIL)  -     Ambulatory Referral to Gynecology    9. Elevated BP without diagnosis of hypertension    10. Moderate episode of recurrent major depressive disorder  -     Ambulatory Referral to Psychiatry  -     GeneSight - Swab,; Future    11. Class 1 obesity due to excess calories with serious comorbidity and body mass index (BMI) of 33.0 to 33.9 in adult    Other orders  -     busPIRone (BUSPAR) 7.5 MG tablet; Take 1 tablet by mouth 3 (Three) Times a Day.  Dispense: 90 tablet; Refill: 1          Patient Instructions     Health Maintenance Due   Topic Date Due    PAP SMEAR  08/20/2023    BMI FOLLOWUP  08/30/2023    COVID-19 Vaccine (3 - 2023-24 season) 09/01/2023    Call here if not heard from us about when OB Recheck by 1/12/24 Patient to call insurance and see if they cover [employer] as she has been on 4 meds and not working               Transcribed from ambient dictation for Anna Kenney MD by Rabia Alston.  01/02/24   11:41 EST    Patient or patient representative verbalized consent to the visit recording.  I have personally performed the services described in this document as transcribed by the above individual, and it is both accurate and  complete.

## 2024-01-03 ENCOUNTER — TELEPHONE (OUTPATIENT)
Dept: FAMILY MEDICINE CLINIC | Facility: CLINIC | Age: 24
End: 2024-01-03
Payer: COMMERCIAL

## 2024-01-03 NOTE — TELEPHONE ENCOUNTER
"Relay     \"Please advise that her last Pap smear that we were able to find was done on 11/3/2022 so she did miss last year and needs to check with them and see if she needs a Pap smear now.  Call if any other questions or concerns\"                "

## 2024-01-04 ENCOUNTER — OFFICE VISIT (OUTPATIENT)
Dept: PSYCHIATRY | Facility: CLINIC | Age: 24
End: 2024-01-04
Payer: COMMERCIAL

## 2024-01-04 DIAGNOSIS — F31.81 BIPOLAR II DISORDER: Primary | Chronic | ICD-10-CM

## 2024-01-04 DIAGNOSIS — F41.1 GENERALIZED ANXIETY DISORDER: Chronic | ICD-10-CM

## 2024-01-04 RX ORDER — LUMATEPERONE 10.5 MG/1
10.5 CAPSULE ORAL NIGHTLY
Qty: 90 CAPSULE | Refills: 1 | Status: SHIPPED | OUTPATIENT
Start: 2024-01-04

## 2024-01-04 RX ORDER — LUMATEPERONE 10.5 MG/1
10.5 CAPSULE ORAL
Qty: 42 CAPSULE | Refills: 0 | COMMUNITY
Start: 2024-01-04

## 2024-01-04 RX ORDER — ESCITALOPRAM OXALATE 20 MG/1
20 TABLET ORAL DAILY
Qty: 90 TABLET | Refills: 1 | Status: SHIPPED | OUTPATIENT
Start: 2024-01-04

## 2024-01-04 NOTE — PROGRESS NOTES
Subjective   Gregory Baez is a 23 y.o. female who presents today for follow up for psychiatric medication management.     Chief Complaint:  Bipolar disorder, anxiety     History of Present Illness:     Medication adjustments last visit:   Continue lexapro 20mg daily. Refill not needed today.   Continue buspirone 5mg twice a day.   Stopped Vraylar.   Start Caplyta 21mg every evening with dinner.     She feels like she has been doing better. She states she is self-medicating with marijuana.   She is on buspirone 7.5mg three times a day. She has not started this dose yet. Her primary care just sent it in a couple days ago.   When she was on the Caplyta, she didn't feel like she was having mood swings. She didn't feel like she needed to be in a hurry all the time. She stopped taking it because at the time she wanted to get a second opinion about the bipolar disorder, and she didn't want to feel drowsy. However since she's been off it, she has had a lot of mood swings, so she feels like it was helping. We discussed trying a smaller dose to see if that would help. Will order 10.5mg. I also will give her samples because she was having issues with the pharmacy being out of it all the time.   Denies any suicidal thoughts.   She has tried counseling with [facility], but didn't feel like she was having a connection.     Previous visit:   Patient completed mood disorder questionnaire and checked yes to 11 out of 17 item. Likely points to a bipolar disorder. She does endorse hypomania in the past, with no true manic episode.     Past medical history: GERD, depression, anxiety, hyperlipidemia, chronic constipation, vitamin D deficiency  Past psychiatric history: depression, anxiety   Family history: mom's sister had schizophrenia, mom had bipolar and schizophrenia, 4 brothers--3 of them should be seen for some type of disorder, but don't get seen.   Social history: Patient is not a smoker, does not currently vape, does not  "report any alcohol use and does not report any recreational drug use.     Patient presents with symptoms and behaviors that are consistent with the following DSM-5 diagnoses:  Bipolar II disorder  2. Generalized anxiety disorder    The following portions of the patient's history were reviewed and updated as appropriate: allergies, current medications, past family history, past medical history, past social history, past surgical history and problem list.    PAST OUTPATIENT TREATMENT  Diagnosis treated:  Affective Disorder, Anxiety/Panic Disorder  Treatment Type:  Medication Management  Prior Psychiatric Medications:  Lexapro  Buspirone  Prozac-didn't work  Zoloft-made her like a zombie, didn't care about anything  Vraylar--felt unmotivated, started having passive SI  Support Groups:  None  Sequelae Of Mental Disorder:  job disruption, social isolation, family disruption, emotional distress    Interval History  Deteriorated     Side Effects  None      Past Medical History:  Past Medical History:   Diagnosis Date    Abnormal bowel habits     Anxiety     Asthma     childhood.    Depression     Dizziness     GERD (gastroesophageal reflux disease)     PTSD (post-traumatic stress disorder)     Substance abuse        Social History:  Social History     Socioeconomic History    Marital status: Single   Tobacco Use    Smoking status: Light Smoker     Types: Electronic Cigarette     Passive exposure: Past    Smokeless tobacco: Former    Tobacco comments:     weaning \"hasn't had cigarette in 3 days\"      Vaping - 1 vap cartridge last 2-3 weeks, nicotine   Vaping Use    Vaping Use: Former    Quit date: 3/10/2023    Substances: Nicotine   Substance and Sexual Activity    Alcohol use: Yes     Alcohol/week: 2.0 standard drinks of alcohol     Types: 2 Glasses of wine per week     Comment: socially - 1 weekend out of the month    Drug use: Yes     Frequency: 4.0 times per week     Types: Marijuana     Comment: occasionally - quit " when found she was pregnant    Sexual activity: Yes     Partners: Male     Birth control/protection: Condom, None       Family History:  Family History   Problem Relation Age of Onset    Drug abuse Mother     Hypertension Father     Diabetes Father     Hyperlipidemia Father     Alcohol abuse Father     Drug abuse Father     Hypertension Maternal Grandmother     Hyperlipidemia Maternal Grandmother     Stroke Maternal Grandmother     Alcohol abuse Maternal Grandfather     Stroke Maternal Grandfather     Hypertension Paternal Grandmother     Diabetes Paternal Grandmother     Hyperlipidemia Paternal Grandmother     Heart disease Paternal Grandmother     Cancer Paternal Grandmother     Hypertension Paternal Grandfather     Diabetes Paternal Grandfather     Hyperlipidemia Paternal Grandfather     Heart disease Paternal Grandfather     Stroke Paternal Grandfather        Past Surgical History:  Past Surgical History:   Procedure Laterality Date    ADENOIDECTOMY      COLONOSCOPY N/A 12/3/2020    Procedure: COLONOSCOPY with random colon biopsy;  Surgeon: Bear Caraballo MD;  Location: [facility] ENDOSCOPY;  Service: Gastroenterology;  Laterality: N/A;  Post op: normal colon, random comon biopsy    DENTAL PROCEDURE      ENDOSCOPY N/A 12/3/2020    Procedure: ESOPHAGOGASTRODUODENOSCOPY with biopsy x2;  Surgeon: Bear Caraballo MD;  Location: [facility] ENDOSCOPY;  Service: Gastroenterology;  Laterality: N/A;  post op: duodenitis, gastritis, biopsy x2    MYRINGOTOMY W/ TUBES      TONSILLECTOMY         Problem List:  Patient Active Problem List   Diagnosis    Palpitations    GERD (gastroesophageal reflux disease)    Other hyperlipidemia    Low grade squamous intraepithelial lesion on cytologic smear of cervix (LGSIL)    Elevated BP without diagnosis of hypertension    Obesity    Class 1 obesity due to excess calories with serious comorbidity and body mass index (BMI) of 33.0 to 33.9 in adult    Moderate episode of recurrent major  depressive disorder       Allergy:   Allergies   Allergen Reactions    Penicillins Hives and Nausea And Vomiting        Discontinued Medications:  Medications Discontinued During This Encounter   Medication Reason    escitalopram (LEXAPRO) 20 MG tablet Reorder         Current Medications:   Current Outpatient Medications   Medication Sig Dispense Refill    escitalopram (LEXAPRO) 20 MG tablet Take 1 tablet by mouth Daily. 90 tablet 1    busPIRone (BUSPAR) 7.5 MG tablet Take 1 tablet by mouth 3 (Three) Times a Day. 90 tablet 1    Lumateperone Tosylate (Caplyta) 10.5 MG capsule Take 1 capsule by mouth Every Night. 90 capsule 1     No current facility-administered medications for this visit.         Psychological ROS: positive for - anxiety, concentration difficulties, depression, irritability, mood swings and sleep disturbances  negative for - behavioral disorder, decreased libido, disorientation, hallucinations, hostility, memory difficulties, obsessive thoughts, physical abuse, sexual abuse or suicidal ideation      Physical Exam:   not currently breastfeeding.    Mental Status Exam:   Hygiene:   good  Cooperation:  Cooperative  Eye Contact:  Good  Psychomotor Behavior:  Appropriate  Affect:  Appropriate  Mood: anxious  Hopelessness: Denies  Speech:  Normal  Thought Process:  Goal directed and Linear  Thought Content:  Normal  Suicidal:  None  Homicidal:  None  Hallucinations:  None  Delusion:  None  Memory:  Intact  Orientation:  Person, Place, Time and Situation  Reliability:  good  Insight:  Good  Judgement:  Good  Impulse Control:  Good  Physical/Medical Issues:  No      Mental status exam was reviewed and compared to visit on 5/10/23 and appropriate updates were made.     PHQ-9 Depression Screening    Little interest or pleasure in doing things? 2-->more than half the days   Feeling down, depressed, or hopeless? 3-->nearly every day   Trouble falling or staying asleep, or sleeping too much? 3-->nearly every day    Feeling tired or having little energy? 3-->nearly every day   Poor appetite or overeating? 0-->not at all   Feeling bad about yourself - or that you are a failure or have let yourself or your family down? 1-->several days   Trouble concentrating on things, such as reading the newspaper or watching television? 2-->more than half the days   Moving or speaking so slowly that other people could have noticed? Or the opposite - being so fidgety or restless that you have been moving around a lot more than usual? 0-->not at all   Thoughts that you would be better off dead, or of hurting yourself in some way? 0-->not at all   PHQ-9 Total Score 14   If you checked off any problems, how difficult have these problems made it for you to do your work, take care of things at home, or get along with other people? not difficult at all         Former smoker    I advised Gregory of the risks of tobacco use.     Result Review:    Labs:  Lab on 11/29/2023   Component Date Value Ref Range Status    TSH 11/29/2023 1.290  0.270 - 4.200 uIU/mL Final    Free T4 11/29/2023 0.94  0.93 - 1.70 ng/dL Final    T4 results may be falsely increased if patient taking Biotin.   Office Visit on 11/03/2023   Component Date Value Ref Range Status    WBC 11/03/2023 6.54  3.40 - 10.80 10*3/mm3 Final    RBC 11/03/2023 4.83  3.77 - 5.28 10*6/mm3 Final    Hemoglobin 11/03/2023 14.3  12.0 - 15.9 g/dL Final    Hematocrit 11/03/2023 41.9  34.0 - 46.6 % Final    MCV 11/03/2023 86.7  79.0 - 97.0 fL Final    MCH 11/03/2023 29.6  26.6 - 33.0 pg Final    MCHC 11/03/2023 34.1  31.5 - 35.7 g/dL Final    RDW 11/03/2023 13.0  12.3 - 15.4 % Final    RDW-SD 11/03/2023 39.9  37.0 - 54.0 fl Final    MPV 11/03/2023 12.5 (H)  6.0 - 12.0 fL Final    Platelets 11/03/2023 287  140 - 450 10*3/mm3 Final    Neutrophil % 11/03/2023 49.1  42.7 - 76.0 % Final    Lymphocyte % 11/03/2023 41.7  19.6 - 45.3 % Final    Monocyte % 11/03/2023 7.0  5.0 - 12.0 % Final    Eosinophil % 11/03/2023  1.1  0.3 - 6.2 % Final    Basophil % 11/03/2023 0.9  0.0 - 1.5 % Final    Immature Grans % 11/03/2023 0.2  0.0 - 0.5 % Final    Neutrophils, Absolute 11/03/2023 3.21  1.70 - 7.00 10*3/mm3 Final    Lymphocytes, Absolute 11/03/2023 2.73  0.70 - 3.10 10*3/mm3 Final    Monocytes, Absolute 11/03/2023 0.46  0.10 - 0.90 10*3/mm3 Final    Eosinophils, Absolute 11/03/2023 0.07  0.00 - 0.40 10*3/mm3 Final    Basophils, Absolute 11/03/2023 0.06  0.00 - 0.20 10*3/mm3 Final    Immature Grans, Absolute 11/03/2023 0.01  0.00 - 0.05 10*3/mm3 Final    nRBC 11/03/2023 0.0  0.0 - 0.2 /100 WBC Final    Glucose 11/03/2023 79  65 - 99 mg/dL Final    BUN 11/03/2023 9  6 - 20 mg/dL Final    Creatinine 11/03/2023 0.68  0.57 - 1.00 mg/dL Final    Sodium 11/03/2023 138  136 - 145 mmol/L Final    Potassium 11/03/2023 4.1  3.5 - 5.2 mmol/L Final    Chloride 11/03/2023 101  98 - 107 mmol/L Final    CO2 11/03/2023 27.0  22.0 - 29.0 mmol/L Final    Calcium 11/03/2023 9.4  8.6 - 10.5 mg/dL Final    Total Protein 11/03/2023 7.3  6.0 - 8.5 g/dL Final    Albumin 11/03/2023 4.6  3.5 - 5.2 g/dL Final    ALT (SGPT) 11/03/2023 31  1 - 33 U/L Final    AST (SGOT) 11/03/2023 23  1 - 32 U/L Final    Alkaline Phosphatase 11/03/2023 58  39 - 117 U/L Final    Total Bilirubin 11/03/2023 0.3  0.0 - 1.2 mg/dL Final    Globulin 11/03/2023 2.7  gm/dL Final    A/G Ratio 11/03/2023 1.7  g/dL Final    BUN/Creatinine Ratio 11/03/2023 13.2  7.0 - 25.0 Final    Anion Gap 11/03/2023 10.0  5.0 - 15.0 mmol/L Final    eGFR 11/03/2023 124.1  >60.0 mL/min/1.73 Final    Total Cholesterol 11/03/2023 201 (H)  0 - 200 mg/dL Final    Triglycerides 11/03/2023 171 (H)  0 - 150 mg/dL Final    HDL Cholesterol 11/03/2023 42  40 - 60 mg/dL Final    LDL Cholesterol  11/03/2023 128 (H)  0 - 100 mg/dL Final    VLDL Cholesterol 11/03/2023 31  5 - 40 mg/dL Final    LDL/HDL Ratio 11/03/2023 2.97   Final    Magnesium 11/03/2023 2.2  1.6 - 2.6 mg/dL Final    25 Hydroxy, Vitamin D 11/03/2023 25.9  (L)  30.0 - 100.0 ng/ml Final    Vitamin B-12 11/03/2023 618  211 - 946 pg/mL Final    TSH 11/03/2023 1.500  0.270 - 4.200 uIU/mL Final       Assessment & Plan   Diagnoses and all orders for this visit:    1. Bipolar II disorder (Primary)  -     Lumateperone Tosylate (Caplyta) 10.5 MG capsule; Take 1 capsule by mouth Every Night.  Dispense: 90 capsule; Refill: 1    2. Generalized anxiety disorder  -     escitalopram (LEXAPRO) 20 MG tablet; Take 1 tablet by mouth Daily.  Dispense: 90 tablet; Refill: 1           Continue lexapro 20mg daily.   Continue buspirone 7.5mg three times a day.   Restart Caplyta, 10.5mg daily with dinner. Samples for 6 weeks given also.     Visit Diagnoses:    ICD-10-CM ICD-9-CM   1. Bipolar II disorder  F31.81 296.89   2. Generalized anxiety disorder  F41.1 300.02         TREATMENT PLAN/GOALS: Continue supportive psychotherapy efforts and medications as indicated. Treatment and medication options discussed during today's visit. Patient ackowledged and verbally consented to continue with current treatment plan and was educated on the importance of compliance with treatment and follow-up appointments.    MEDICATION ISSUES:  INSPECT reviewed as expected    Discussed medication options and treatment plan of prescribed medication as well as the risks, benefits, and side effects including potential falls, possible impaired driving and metabolic adversities among others. Patient is agreeable to call the office with any worsening of symptoms or onset of side effects. Patient is agreeable to call 911 or go to the nearest ER should he/she begin having SI/HI. No medication side effects or related complaints today.     MEDS ORDERED DURING VISIT:  New Medications Ordered This Visit   Medications    escitalopram (LEXAPRO) 20 MG tablet     Sig: Take 1 tablet by mouth Daily.     Dispense:  90 tablet     Refill:  1    Lumateperone Tosylate (Caplyta) 10.5 MG capsule     Sig: Take 1 capsule by mouth Every  Night.     Dispense:  90 capsule     Refill:  1       Return in about 4 months (around 5/4/2024).         This document has been electronically signed by LIMA Kinsey  January 4, 2024 09:25 EST    Part of this note may be an electronic transcription/translation of spoken language to printed text using the Dragon Dictation System.

## 2024-01-08 ENCOUNTER — PRIOR AUTHORIZATION (OUTPATIENT)
Dept: PSYCHIATRY | Facility: CLINIC | Age: 24
End: 2024-01-08
Payer: COMMERCIAL

## 2024-01-20 DIAGNOSIS — F41.1 GENERALIZED ANXIETY DISORDER: Chronic | ICD-10-CM

## 2024-01-21 RX ORDER — ESCITALOPRAM OXALATE 20 MG/1
20 TABLET ORAL DAILY
Qty: 90 TABLET | Refills: 1 | Status: SHIPPED | OUTPATIENT
Start: 2024-01-21

## 2024-03-27 NOTE — TELEPHONE ENCOUNTER
Rx Refill Note  Requested Prescriptions     Pending Prescriptions Disp Refills    busPIRone (BUSPAR) 7.5 MG tablet 90 tablet 0     Sig: Take 1 tablet by mouth 3 (Three) Times a Day.      Last office visit with prescribing clinician: 1/2/2024   Last telemedicine visit with prescribing clinician: Visit date not found   Next office visit with prescribing clinician: 4/8/2024                         Would you like a call back once the refill request has been completed: [] Yes [] No    If the office needs to give you a call back, can they leave a voicemail: [] Yes [] No    Eva Arreola MA  03/27/24, 11:34 EDT

## 2024-04-01 RX ORDER — BUSPIRONE HYDROCHLORIDE 7.5 MG/1
7.5 TABLET ORAL 3 TIMES DAILY
Qty: 90 TABLET | Refills: 0 | Status: SHIPPED | OUTPATIENT
Start: 2024-04-01

## 2024-04-03 ENCOUNTER — TELEPHONE (OUTPATIENT)
Dept: FAMILY MEDICINE CLINIC | Facility: CLINIC | Age: 24
End: 2024-04-03

## 2024-04-03 NOTE — LETTER
April 4, 2024    Gregory Baez  [address]  [place]        We have been unable to reach you:    Nothing showed on the breast imaging of your area of palpable concern.  We certainly can have you follow-up with one of the general surgeon should you desire.  Please let us know.                           Anna Kenney MD

## 2024-04-03 NOTE — TELEPHONE ENCOUNTER
RELAY----- Message from Anna Kenney MD sent at 4/3/2024  7:08 AM EDT -----  Nothing showed on the breast imaging of your area of palpable concern.  We certainly can have you follow-up with one of the general surgeon should you desire.  Please let us know.

## 2024-04-07 PROBLEM — E55.9 VITAMIN D DEFICIENCY: Status: ACTIVE | Noted: 2024-04-07

## 2024-04-10 DIAGNOSIS — F31.81 BIPOLAR II DISORDER: Primary | ICD-10-CM

## 2024-04-10 RX ORDER — LUMATEPERONE 10.5 MG/1
10.5 CAPSULE ORAL
Qty: 14 CAPSULE | Refills: 0 | COMMUNITY
Start: 2024-04-10

## 2024-04-10 NOTE — TELEPHONE ENCOUNTER
Patient called stating that the pharmacy has ordered her caplyta but it will take 3-4 days to get it in and she is now out of the Caplyta 10.5 mg    She is asking for samples.  Please sign sample order

## 2024-04-25 DIAGNOSIS — F31.81 BIPOLAR II DISORDER: ICD-10-CM

## 2024-04-25 RX ORDER — LUMATEPERONE 10.5 MG/1
10.5 CAPSULE ORAL
Qty: 14 CAPSULE | Refills: 0 | COMMUNITY
Start: 2024-04-25

## 2024-04-25 NOTE — TELEPHONE ENCOUNTER
Patient is in between jobs and insurance. We are using a voucher for her caplyta. But they have to order it.     I gave her 2 weeks of samples of the 10.5 mg  Please sign order.

## 2024-05-17 RX ORDER — BUSPIRONE HYDROCHLORIDE 7.5 MG/1
7.5 TABLET ORAL 3 TIMES DAILY
Qty: 90 TABLET | Refills: 0 | Status: SHIPPED | OUTPATIENT
Start: 2024-05-17

## 2024-05-17 NOTE — TELEPHONE ENCOUNTER
Rx Refill Note  Requested Prescriptions     Pending Prescriptions Disp Refills    busPIRone (BUSPAR) 7.5 MG tablet 90 tablet 0     Sig: Take 1 tablet by mouth 3 (Three) Times a Day.      Last office visit with prescribing clinician: 1/2/2024   Last telemedicine visit with prescribing clinician: Visit date not found   Next office visit with prescribing clinician: Visit date not found                         Would you like a call back once the refill request has been completed: [] Yes [] No    If the office needs to give you a call back, can they leave a voicemail: [] Yes [] No    Janeth Weaver MA  05/17/24, 15:08 EDT

## 2024-05-17 NOTE — TELEPHONE ENCOUNTER
Rx Refill Note  Requested Prescriptions     Pending Prescriptions Disp Refills   • busPIRone (BUSPAR) 7.5 MG tablet 90 tablet 0     Sig: Take 1 tablet by mouth 3 (Three) Times a Day.      Last office visit with prescribing clinician: 1/2/2024   Last telemedicine visit with prescribing clinician: Visit date not found   Next office visit with prescribing clinician: Visit date not found       Would you like a call back once the refill request has been completed: [] Yes [] No    If the office needs to give you a call back, can they leave a voicemail: [] Yes [] No    Janeth Weaver MA  05/17/24, 15:09 EDT

## 2024-05-22 RX ORDER — BUSPIRONE HYDROCHLORIDE 7.5 MG/1
7.5 TABLET ORAL 3 TIMES DAILY
Qty: 90 TABLET | Refills: 0 | OUTPATIENT
Start: 2024-05-22

## 2024-06-11 ENCOUNTER — TELEPHONE (OUTPATIENT)
Dept: PSYCHIATRY | Facility: CLINIC | Age: 24
End: 2024-06-11

## 2024-06-11 NOTE — TELEPHONE ENCOUNTER
Patient called stating there was a mix up with her trying to get on Medicaid. She had to re apply and put it on the fast track.  She also has had something happen and she now is a single mom.  The Caplyta is working for her.  She wants to come see you but can not afford it right now.  She was wondering if we can give her some samples until she can get this taken care of with Medicaid and get in here to see you. She states she is still on the Caplyta 10.5 mg and could possibly use the next dosage if possible.

## 2024-06-12 RX ORDER — LUMATEPERONE 21 MG/1
21 CAPSULE ORAL
Qty: 14 CAPSULE | Refills: 0 | COMMUNITY
Start: 2024-06-12

## 2024-06-12 RX ORDER — LUMATEPERONE 42 MG/1
42 CAPSULE ORAL
Qty: 28 CAPSULE | Refills: 0 | COMMUNITY
Start: 2024-06-12

## 2024-06-24 ENCOUNTER — TELEPHONE (OUTPATIENT)
Dept: PSYCHIATRY | Facility: CLINIC | Age: 24
End: 2024-06-24
Payer: MEDICAID

## 2024-06-24 NOTE — TELEPHONE ENCOUNTER
Pt LVM on Marisa's line last week June 20th @8:46am that she needs the 21 mg Caplyta to be sent to her [facility] pharmacy on file.

## 2024-06-25 RX ORDER — LUMATEPERONE 21 MG/1
21 CAPSULE ORAL
Qty: 30 CAPSULE | Refills: 1 | Status: SHIPPED | OUTPATIENT
Start: 2024-06-25 | End: 2024-06-26 | Stop reason: SDUPTHER

## 2024-06-26 ENCOUNTER — PRIOR AUTHORIZATION (OUTPATIENT)
Dept: PSYCHIATRY | Facility: CLINIC | Age: 24
End: 2024-06-26
Payer: MEDICAID

## 2024-06-26 DIAGNOSIS — F33.1 MODERATE EPISODE OF RECURRENT MAJOR DEPRESSIVE DISORDER: Primary | ICD-10-CM

## 2024-06-26 RX ORDER — LUMATEPERONE 21 MG/1
21 CAPSULE ORAL
Qty: 28 CAPSULE | Refills: 0 | COMMUNITY
Start: 2024-06-26

## 2024-06-26 NOTE — TELEPHONE ENCOUNTER
PA denied due to it being a benefit exclusion and not covered by the plan.  Pt informed; she would like a less expensive alternative that she has never been on before.  Please advise.

## 2024-06-26 NOTE — TELEPHONE ENCOUNTER
Pt will  Caplyta 21 mg samples and fill out the pt assist application in her bag.  Please sign attached sample order at bottom of screen.  Thanks.

## 2024-07-24 ENCOUNTER — TELEPHONE (OUTPATIENT)
Dept: PSYCHIATRY | Facility: CLINIC | Age: 24
End: 2024-07-24
Payer: MEDICAID

## 2024-07-24 RX ORDER — LURASIDONE HYDROCHLORIDE 20 MG/1
20 TABLET, FILM COATED ORAL
Qty: 30 TABLET | Refills: 0 | Status: SHIPPED | OUTPATIENT
Start: 2024-07-24 | End: 2024-07-26

## 2024-07-24 NOTE — TELEPHONE ENCOUNTER
"They faxed response that they need to know if pt has insurance.  Pt says she has Medicaid.  The box under \"I certify that I have no health insurance coverage.\" Also the form says the pt assistance program is only available to uninsured pts.      Pt's insurance says it is a plan exclusion and PA was denied. Pt says out of pocket cost is $2000.  What other meds can she try?  "

## 2024-07-26 ENCOUNTER — TELEPHONE (OUTPATIENT)
Dept: PSYCHIATRY | Facility: CLINIC | Age: 24
End: 2024-07-26
Payer: MEDICAID

## 2024-07-26 RX ORDER — LURASIDONE HYDROCHLORIDE 20 MG/1
20 TABLET, FILM COATED ORAL
Qty: 30 TABLET | Refills: 1 | Status: SHIPPED | OUTPATIENT
Start: 2024-07-26

## 2024-07-26 NOTE — TELEPHONE ENCOUNTER
Attempted to contact patient regarding latuda.  No answer.  LMV asking patient to call back.  Patient can get the generic latuda using [facility] for $22.22 at [facility].

## 2024-07-26 NOTE — TELEPHONE ENCOUNTER
Patient came in the office stating insurance will not cover generic or name brand latuda.  She is requesting Caplyta 21 mg.  She states she was on this previously.  I attempted to contact [facility] and was transferred to [place].  Left detailed message asking them to call back.

## 2024-07-26 NOTE — TELEPHONE ENCOUNTER
Patient called back.  Informed her she can use the [facility] Rx card for the latuda.  She is requesting the latuda be sent to the [facility] [place].

## 2024-07-28 NOTE — TELEPHONE ENCOUNTER
Rx Refill Note  Requested Prescriptions     Pending Prescriptions Disp Refills   • busPIRone (BUSPAR) 7.5 MG tablet [Pharmacy Med Name: BUSPIRONE HCL 7.5 MG TABLET] 90 tablet 0     Sig: TAKE 1 TABLET BY MOUTH THREE TIMES A DAY      Last office visit with prescribing clinician: 1/2/2024      Next office visit with prescribing clinician: Visit date not found   3}  Barby Medina  07/28/24, 17:55 EDT

## 2024-07-29 RX ORDER — BUSPIRONE HYDROCHLORIDE 7.5 MG/1
7.5 TABLET ORAL 3 TIMES DAILY
Qty: 90 TABLET | Refills: 0 | Status: SHIPPED | OUTPATIENT
Start: 2024-07-29

## 2024-08-01 ENCOUNTER — TELEPHONE (OUTPATIENT)
Dept: FAMILY MEDICINE CLINIC | Facility: CLINIC | Age: 24
End: 2024-08-01
Payer: MEDICAID

## 2024-08-01 NOTE — TELEPHONE ENCOUNTER
Caller: Gregory Baez    Relationship: Self    Best call back number: [phone]     What form or medical record are you requesting: VACCINATION / SHOT RECORDS     Who is requesting this form or medical record from you: [employer]    How would you like to receive the form or medical records (pick-up, mail, fax):      Additional notes: PATIENT IS REQUESTING A COPY OF HER SHOT / VACCINATION RECORDS THAT SHE NEEDS FOR [employer] SCHO[employer]    PLEASE ADVISE

## 2024-10-04 RX ORDER — LURASIDONE HYDROCHLORIDE 20 MG/1
20 TABLET, FILM COATED ORAL
Qty: 30 TABLET | Refills: 1 | Status: SHIPPED | OUTPATIENT
Start: 2024-10-04

## 2024-12-10 RX ORDER — LURASIDONE HYDROCHLORIDE 20 MG/1
20 TABLET, FILM COATED ORAL
Qty: 30 TABLET | Refills: 1 | Status: SHIPPED | OUTPATIENT
Start: 2024-12-10

## 2024-12-10 NOTE — TELEPHONE ENCOUNTER
Rx Refill Note  Requested Prescriptions     Pending Prescriptions Disp Refills    Lurasidone HCl (LATUDA) 20 MG tablet tablet [Pharmacy Med Name: LURASIDONE HCL 20 MG TABLET] 30 tablet 1     Sig: TAKE 1 TABLET BY MOUTH DAILY WITH DINNER        Last office visit with prescribing clinician: 1/4/2024     Next office visit with prescribing clinician: 12/19/2024     Office Visit with Fatmata Mari APRN (01/04/2024)     Nikki Caruso  12/10/24, 11:07 EST

## 2024-12-19 ENCOUNTER — OFFICE VISIT (OUTPATIENT)
Dept: PSYCHIATRY | Facility: CLINIC | Age: 24
End: 2024-12-19
Payer: MEDICAID

## 2024-12-19 DIAGNOSIS — F43.10 POST TRAUMATIC STRESS DISORDER (PTSD): Primary | Chronic | ICD-10-CM

## 2024-12-19 DIAGNOSIS — F41.1 GENERALIZED ANXIETY DISORDER: Chronic | ICD-10-CM

## 2024-12-19 DIAGNOSIS — F90.0 ADHD (ATTENTION DEFICIT HYPERACTIVITY DISORDER), INATTENTIVE TYPE: Chronic | ICD-10-CM

## 2024-12-19 DIAGNOSIS — Z79.899 ENCOUNTER FOR LONG-TERM (CURRENT) USE OF MEDICATIONS: ICD-10-CM

## 2024-12-19 RX ORDER — LISDEXAMFETAMINE DIMESYLATE 30 MG/1
30 CAPSULE ORAL EVERY MORNING
Qty: 30 CAPSULE | Refills: 0 | Status: SHIPPED | OUTPATIENT
Start: 2024-12-19

## 2024-12-19 NOTE — PROGRESS NOTES
Gregory verified medication list  Verified she is smoking Marijuana often  Gave adequate amount of specimen for UDS
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
## 2024-12-26 LAB
1OH-MIDAZOLAM UR QL SCN: NOT DETECTED NG/MG CREAT
6MAM UR QL SCN: NEGATIVE NG/ML
7AMINOCLONAZEPAM/CREAT UR: NOT DETECTED NG/MG CREAT
8OH-AMOXAPINE UR QL: NOT DETECTED
8OH-LOXAPINE UR QL SCN: NOT DETECTED
A-OH ALPRAZ/CREAT UR: NOT DETECTED NG/MG CREAT
A-OH-TRIAZOLAM/CREAT UR CFM: NOT DETECTED NG/MG CREAT
ALPRAZ/CREAT UR CFM: NOT DETECTED NG/MG CREAT
AMITRIP UR-MCNC: NOT DETECTED NG/ML
AMOXAPINE UR QL: NOT DETECTED
AMPHETAMINES UR QL SCN>500 NG/ML: NEGATIVE NG/ML
ANTICONVULSANTS UR: NEGATIVE
ANTIPSYCHOTICS UR: NEGATIVE
ARIPIPRAZOLE UR QL SCN: NOT DETECTED
ASENAPINE UR QL CFM: NOT DETECTED
BARBITURATES UR QL SCN: NEGATIVE NG/ML
BENZODIAZ SCN METH UR: NEGATIVE
BUPRENORPHINE UR QL SCN: NEGATIVE NG/ML
BUPROPION UR QL: NOT DETECTED
CANNABINOIDS UR QL CFM: NORMAL
CANNABINOIDS UR QL SCN: NORMAL NG/ML
CARBOXYTHC UR CFM-MCNC: 37 NG/MG CREAT
CARISOPRODOL UR QL: NEGATIVE NG/ML
CHLORPROMAZINE UR QL: NOT DETECTED
CITALOPRAM, UR: PRESENT
CLOMIPRAMINE UR-MCNC: NOT DETECTED NG/ML
CLONAZEPAM/CREAT UR CFM: NOT DETECTED NG/MG CREAT
CLOZAPINE UR QL: NOT DETECTED
COCAINE+BZE UR QL SCN: NEGATIVE NG/ML
CREAT UR-MCNC: 78 MG/DL
DESALKYLFLURAZ/CREAT UR: NOT DETECTED NG/MG CREAT
DESIPRAMINE UR-MCNC: NOT DETECTED NG/ML
DIAZEPAM/CREAT UR: NOT DETECTED NG/MG CREAT
DOXEPIN UR-MCNC: NOT DETECTED NG/ML
DULOXETINE UR QL: NOT DETECTED
ETHANOL UR QL SCN: NEGATIVE NG/ML
FENTANYL UR QL SCN: NEGATIVE NG/ML
FLUNITRAZEPAM UR QL SCN: NOT DETECTED NG/MG CREAT
FLUOXETINE UR QL SCN: NOT DETECTED
FLUPHENAZINE UR-MCNC: NOT DETECTED NG/ML
FLUVOXAMINE UR QL: NOT DETECTED
GABAPENTIN UR-MCNC: NEGATIVE UG/ML
HALOPERIDOL UR QL: NOT DETECTED
ILOPERIDONE UR QL CFM: NOT DETECTED
IMIPRAMINE UR-MCNC: NOT DETECTED NG/ML
KRATOM IA, UR: NEGATIVE NG/ML
LORAZEPAM/CREAT UR: NOT DETECTED NG/MG CREAT
LOXAPINE UR QL: NOT DETECTED
LURASIDONE UR QL CFM: NOT DETECTED
MAPROTILINE UR QL: NOT DETECTED
ME-PHENIDATE UR QL SCN: NEGATIVE NG/ML
MESORIDAZINE UR QL: NOT DETECTED
METHADONE UR QL SCN: NEGATIVE NG/ML
METHADONE+METAB UR QL SCN: NEGATIVE NG/ML
MIDAZOLAM/CREAT UR CFM: NOT DETECTED NG/MG CREAT
MIRTAZAPINE UR-MCNC: NOT DETECTED UG/ML
MOLINDONE UR QL SCN: NOT DETECTED
NEFAZODONE UR QL: NOT DETECTED
NORCITALOPRAM UR QL: PRESENT
NORCLOMIPRAMINE UR QL: NOT DETECTED
NORCLOZAPINE UR QL: NOT DETECTED
NORDIAZEPAM/CREAT UR: NOT DETECTED NG/MG CREAT
NORDOXEPIN UR QL: NOT DETECTED
NORFLUNITRAZEPAM UR-MCNC: NOT DETECTED NG/MG CREAT
NORFLUOXETINE UR-MCNC: NOT DETECTED NG/ML
NORSERTRALINE UR QL: NOT DETECTED
NORTRIP UR-MCNC: NOT DETECTED NG/ML
ODV UR-MCNC: NOT DETECTED NG/ML
OH-BUPROPION UR-MCNC: NOT DETECTED NG/ML
OLANZAPINE UR CFM-MCNC: NOT DETECTED NG/ML
OPIATES UR SCN-MCNC: NEGATIVE NG/ML
OXAZEPAM/CREAT UR: NOT DETECTED NG/MG CREAT
OXYCODONE CTO UR SCN-MCNC: NEGATIVE NG/ML
PAROXETINE UR-MCNC: NOT DETECTED NG/L
PCP UR QL SCN: NEGATIVE NG/ML
PERPHENAZINE UR QL: NOT DETECTED
PIMOZIDE, UR: NOT DETECTED
PREGABALIN UR QL SCN: NOT DETECTED
PRESCRIBED MEDICATIONS: NORMAL
PROCHLORPERAZINE UR QL: NOT DETECTED
PROPOXYPH UR QL SCN: NEGATIVE NG/ML
PROTRIP UR QL: NOT DETECTED
QUETIAPINE CTO UR CFM-MCNC: NOT DETECTED NG/ML
RISPERIDONE UR QL: NOT DETECTED
SERTRALINE UR-MCNC: NOT DETECTED NG/ML
TAPENTADOL CTO UR SCN-MCNC: NEGATIVE NG/ML
TEMAZEPAM/CREAT UR: NOT DETECTED NG/MG CREAT
THIORIDAZINE UR-MCNC: NOT DETECTED UG/ML
THIOTHIXENE UR QL: NOT DETECTED
TRAMADOL UR QL SCN: NEGATIVE NG/ML
TRAZODONE UR QL: NOT DETECTED
TRAZODONE UR-MCNC: NOT DETECTED UG/ML
TRICYCLICS TESTED UR SCN: NORMAL
TRIFPERAZINE UR QL: NOT DETECTED
TRIMIPRAMINE UR QL: NOT DETECTED
VENLAFAXINE UR QL: NOT DETECTED
VILAZ UR QL SCN: NOT DETECTED
ZIPRASIDONE UR QL SCN: NOT DETECTED

## 2025-01-15 ENCOUNTER — TELEPHONE (OUTPATIENT)
Dept: PSYCHIATRY | Facility: CLINIC | Age: 25
End: 2025-01-15
Payer: MEDICAID

## 2025-01-15 NOTE — TELEPHONE ENCOUNTER
Called Gregory with her Gene Sight results per provider.    How is she doing on her medications? If ok will continue them as they are.      Gregory stated that she can still have some depression spells that all she wants to do is sleep. She doesn't know if the Lexapro needs to be changed or increased.  Since being on the Vyvanse she can become irritated easily.

## 2025-01-22 RX ORDER — DESVENLAFAXINE 50 MG/1
50 TABLET, FILM COATED, EXTENDED RELEASE ORAL DAILY
Qty: 30 TABLET | Refills: 1 | Status: SHIPPED | OUTPATIENT
Start: 2025-01-22

## 2025-01-22 RX ORDER — DESVENLAFAXINE 25 MG/1
25 TABLET, EXTENDED RELEASE ORAL DAILY
Qty: 14 TABLET | Refills: 0 | Status: SHIPPED | OUTPATIENT
Start: 2025-01-22 | End: 2025-02-05

## 2025-01-22 RX ORDER — ESCITALOPRAM OXALATE 10 MG/1
TABLET ORAL
Qty: 18 TABLET | Refills: 0 | Status: SHIPPED | OUTPATIENT
Start: 2025-01-22 | End: 2025-02-12

## 2025-01-22 NOTE — TELEPHONE ENCOUNTER
Spoke with Gregory.    She would like to try something else for her ADHD. We looked at her Genesight and with her insurance, she probably needs to try the adderall.    She is also good with changing out the Lexapro to Pristiq. She asked about where the Pristiq was on the Gene sight and I told her the green, compared to the yellow of the lexapro.

## 2025-02-10 RX ORDER — ESCITALOPRAM OXALATE 10 MG/1
TABLET ORAL
Qty: 18 TABLET | Refills: 0 | OUTPATIENT
Start: 2025-02-10 | End: 2025-03-02

## 2025-02-10 NOTE — TELEPHONE ENCOUNTER
"Pt says she has tapered down to 5 mg this week, but is not sure why the pharmacy requested this.  It is probably an autofill request. You can reject it as \"refill inappropriate.\"  "

## 2025-02-12 RX ORDER — LURASIDONE HYDROCHLORIDE 20 MG/1
20 TABLET, FILM COATED ORAL
Qty: 30 TABLET | Refills: 0 | Status: SHIPPED | OUTPATIENT
Start: 2025-02-12

## 2025-02-12 NOTE — TELEPHONE ENCOUNTER
Rx Refill Note  Requested Prescriptions     Pending Prescriptions Disp Refills    Lurasidone HCl (LATUDA) 20 MG tablet tablet [Pharmacy Med Name: LURASIDONE HCL 20 MG TABLET] 30 tablet 1     Sig: TAKE 1 TABLET BY MOUTH DAILY WITH DINNER        Last office visit with prescribing clinician: 12/19/2024     Next office visit with prescribing clinician: 2/28/2025     Office Visit with Fatmata Mari APRN (12/19/2024)     Nikki Caruso  02/12/25, 08:59 EST

## 2025-02-14 ENCOUNTER — TELEPHONE (OUTPATIENT)
Dept: PSYCHIATRY | Facility: CLINIC | Age: 25
End: 2025-02-14
Payer: MEDICAID

## 2025-02-14 DIAGNOSIS — F90.0 ADHD (ATTENTION DEFICIT HYPERACTIVITY DISORDER), INATTENTIVE TYPE: Primary | ICD-10-CM

## 2025-02-14 RX ORDER — DEXTROAMPHETAMINE SACCHARATE, AMPHETAMINE ASPARTATE MONOHYDRATE, DEXTROAMPHETAMINE SULFATE AND AMPHETAMINE SULFATE 2.5; 2.5; 2.5; 2.5 MG/1; MG/1; MG/1; MG/1
10 CAPSULE, EXTENDED RELEASE ORAL DAILY
Qty: 30 CAPSULE | Refills: 0 | Status: SHIPPED | OUTPATIENT
Start: 2025-02-14 | End: 2025-02-17 | Stop reason: SDUPTHER

## 2025-02-14 NOTE — TELEPHONE ENCOUNTER
Gregory called asking if she could start back on her ADHD medication since she is off the lexapro totally now and on the Pristiq.  She feels like she is good and stable.

## 2025-02-17 DIAGNOSIS — F90.0 ADHD (ATTENTION DEFICIT HYPERACTIVITY DISORDER), INATTENTIVE TYPE: ICD-10-CM

## 2025-02-17 RX ORDER — DEXTROAMPHETAMINE SACCHARATE, AMPHETAMINE ASPARTATE MONOHYDRATE, DEXTROAMPHETAMINE SULFATE AND AMPHETAMINE SULFATE 2.5; 2.5; 2.5; 2.5 MG/1; MG/1; MG/1; MG/1
10 CAPSULE, EXTENDED RELEASE ORAL DAILY
Qty: 30 CAPSULE | Refills: 0 | Status: SHIPPED | OUTPATIENT
Start: 2025-02-17 | End: 2026-02-17

## 2025-02-17 NOTE — TELEPHONE ENCOUNTER
We sent to [facility] before she could let us know she needs it sent to [facility]. Sent a fax to [facility] to cancel adderall, no answer, trouble with their phone at this time the recording stated.

## 2025-02-17 NOTE — TELEPHONE ENCOUNTER
She requested if she could have an increase, you sent it in before she knew the answer was yes and she nneded it to go to [facility], but she would get it at [facility] but they do not have it

## 2025-02-18 DIAGNOSIS — F41.1 GENERALIZED ANXIETY DISORDER: Chronic | ICD-10-CM

## 2025-02-18 NOTE — TELEPHONE ENCOUNTER
Rx Refill Note  Requested Prescriptions     Pending Prescriptions Disp Refills    escitalopram (LEXAPRO) 20 MG tablet [Pharmacy Med Name: ESCITALOPRAM 20 MG TABLET] 90 tablet 1     Sig: TAKE 1 TABLET BY MOUTH EVERY DAY        Last office visit with prescribing clinician: 12/19/2024     Next office visit with prescribing clinician: 2/28/2025     Telephone with Fatmata Mari APRN (01/15/2025)       Nikki Caruso  02/18/25, 10:45 EST

## 2025-02-20 NOTE — TELEPHONE ENCOUNTER
Gregory did not request it. She is off of it. This came from the pharmacy.  I have to send to my responses in the refill request, I am not to start a new note.

## 2025-02-21 RX ORDER — ESCITALOPRAM OXALATE 20 MG/1
20 TABLET ORAL DAILY
Qty: 90 TABLET | Refills: 1 | OUTPATIENT
Start: 2025-02-21

## 2025-03-06 RX ORDER — DESVENLAFAXINE 50 MG/1
50 TABLET, FILM COATED, EXTENDED RELEASE ORAL DAILY
Qty: 30 TABLET | Refills: 1 | Status: SHIPPED | OUTPATIENT
Start: 2025-03-06

## 2025-03-17 DIAGNOSIS — F90.0 ADHD (ATTENTION DEFICIT HYPERACTIVITY DISORDER), INATTENTIVE TYPE: ICD-10-CM

## 2025-03-17 RX ORDER — DEXTROAMPHETAMINE SACCHARATE, AMPHETAMINE ASPARTATE MONOHYDRATE, DEXTROAMPHETAMINE SULFATE AND AMPHETAMINE SULFATE 2.5; 2.5; 2.5; 2.5 MG/1; MG/1; MG/1; MG/1
10 CAPSULE, EXTENDED RELEASE ORAL DAILY
Qty: 30 CAPSULE | Refills: 0 | Status: SHIPPED | OUTPATIENT
Start: 2025-03-17 | End: 2026-03-17

## 2025-03-17 RX ORDER — LURASIDONE HYDROCHLORIDE 20 MG/1
20 TABLET, FILM COATED ORAL
Qty: 30 TABLET | Refills: 0 | Status: SHIPPED | OUTPATIENT
Start: 2025-03-17

## 2025-03-17 NOTE — TELEPHONE ENCOUNTER
Rx Refill Note  Requested Prescriptions     Pending Prescriptions Disp Refills    Lurasidone HCl (LATUDA) 20 MG tablet tablet 30 tablet 0     Sig: Take 1 tablet by mouth Daily With Dinner.    amphetamine-dextroamphetamine XR (Adderall XR) 10 MG 24 hr capsule 30 capsule 0     Sig: Take 1 capsule by mouth Daily      Last office visit with prescribing clinician: 12/19/2024   Last telemedicine visit with prescribing clinician: Visit date not found   Next office visit with prescribing clinician: 4/16/2025   Office Visit with Fatmata Mari APRN (12/19/2024)   ToxAssure Flex 22, Ur w/DL - Urine, Random Void (12/19/2024 16:30)                     Would you like a call back once the refill request has been completed: [] Yes [] No    If the office needs to give you a call back, can they leave a voicemail: [] Yes [] No    Leila Casper MA  03/17/25, 14:59 EDT      INSPECT - SCAN - INSPECT, [id number], 02/27/2025 (02/27/2025)

## 2025-03-18 ENCOUNTER — TELEPHONE (OUTPATIENT)
Dept: PSYCHIATRY | Facility: CLINIC | Age: 25
End: 2025-03-18
Payer: MEDICAID

## 2025-03-18 NOTE — TELEPHONE ENCOUNTER
"Pt dropped off some \" Candidate\" instructions with Aakash yesterday from [facility] [place].    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    It states that a letter is needed from her psychiatrist that states diagnosis (es), and whether pt is stable, and that the pt is safe to drive a Commercial Motor Vehicle without restrictions.   "

## 2025-03-19 NOTE — TELEPHONE ENCOUNTER
Pt informed we w/c her when letter is signed and to let her know we faxed it to [facility] at fax [phone].    Their phone is [phone]. Location [address] [place] [address]

## 2025-04-08 RX ORDER — LURASIDONE HYDROCHLORIDE 20 MG/1
20 TABLET, FILM COATED ORAL
Qty: 30 TABLET | Refills: 0 | Status: SHIPPED | OUTPATIENT
Start: 2025-04-08

## 2025-04-08 NOTE — TELEPHONE ENCOUNTER
Rx Refill Note  Requested Prescriptions     Pending Prescriptions Disp Refills    Lurasidone HCl (LATUDA) 20 MG tablet tablet [Pharmacy Med Name: LURASIDONE HCL 20 MG TABLET] 30 tablet 0     Sig: TAKE 1 TABLET BY MOUTH DAILY WITH DINNER      Last office visit with prescribing clinician: 12/19/2024   Last telemedicine visit with prescribing clinician: Visit date not found   Next office visit with prescribing clinician: 4/16/2025   Office Visit with Fatmata Mari APRN (12/19/2024)                       Would you like a call back once the refill request has been completed: [] Yes [] No    If the office needs to give you a call back, can they leave a voicemail: [] Yes [] No    Leila Casper MA  04/08/25, 15:32 EDT

## 2025-04-12 DIAGNOSIS — F90.0 ADHD (ATTENTION DEFICIT HYPERACTIVITY DISORDER), INATTENTIVE TYPE: ICD-10-CM

## 2025-04-12 RX ORDER — DEXTROAMPHETAMINE SACCHARATE, AMPHETAMINE ASPARTATE MONOHYDRATE, DEXTROAMPHETAMINE SULFATE AND AMPHETAMINE SULFATE 2.5; 2.5; 2.5; 2.5 MG/1; MG/1; MG/1; MG/1
10 CAPSULE, EXTENDED RELEASE ORAL DAILY
Qty: 30 CAPSULE | Refills: 0 | Status: SHIPPED | OUTPATIENT
Start: 2025-04-12 | End: 2026-04-12

## 2025-05-07 DIAGNOSIS — F90.0 ADHD (ATTENTION DEFICIT HYPERACTIVITY DISORDER), INATTENTIVE TYPE: ICD-10-CM

## 2025-05-07 RX ORDER — DESVENLAFAXINE 50 MG/1
50 TABLET, FILM COATED, EXTENDED RELEASE ORAL DAILY
Qty: 30 TABLET | Refills: 1 | Status: SHIPPED | OUTPATIENT
Start: 2025-05-07

## 2025-05-07 RX ORDER — DEXTROAMPHETAMINE SACCHARATE, AMPHETAMINE ASPARTATE MONOHYDRATE, DEXTROAMPHETAMINE SULFATE AND AMPHETAMINE SULFATE 2.5; 2.5; 2.5; 2.5 MG/1; MG/1; MG/1; MG/1
10 CAPSULE, EXTENDED RELEASE ORAL DAILY
Qty: 30 CAPSULE | Refills: 0 | Status: SHIPPED | OUTPATIENT
Start: 2025-05-07 | End: 2026-05-07

## 2025-05-17 RX ORDER — LURASIDONE HYDROCHLORIDE 20 MG/1
20 TABLET, FILM COATED ORAL
Qty: 30 TABLET | Refills: 0 | Status: SHIPPED | OUTPATIENT
Start: 2025-05-17

## 2025-06-09 RX ORDER — DESVENLAFAXINE 50 MG/1
50 TABLET, FILM COATED, EXTENDED RELEASE ORAL DAILY
Qty: 30 TABLET | Refills: 1 | Status: SHIPPED | OUTPATIENT
Start: 2025-06-09 | End: 2025-06-16 | Stop reason: SDUPTHER

## 2025-06-09 NOTE — TELEPHONE ENCOUNTER
Rx Refill Note  Requested Prescriptions     Pending Prescriptions Disp Refills    desvenlafaxine (Pristiq) 50 MG 24 hr tablet 30 tablet 1     Sig: Take 1 tablet by mouth Daily.        Last office visit with prescribing clinician: 12/19/2024     Next office visit with prescribing clinician: 6/16/2025     Office Visit with Fatmata Mari APRN (12/19/2024)     Nikki Caruso  06/09/25, 08:48 EDT

## 2025-06-10 DIAGNOSIS — F90.0 ADHD (ATTENTION DEFICIT HYPERACTIVITY DISORDER), INATTENTIVE TYPE: ICD-10-CM

## 2025-06-10 RX ORDER — DEXTROAMPHETAMINE SACCHARATE, AMPHETAMINE ASPARTATE MONOHYDRATE, DEXTROAMPHETAMINE SULFATE AND AMPHETAMINE SULFATE 2.5; 2.5; 2.5; 2.5 MG/1; MG/1; MG/1; MG/1
10 CAPSULE, EXTENDED RELEASE ORAL DAILY
Qty: 30 CAPSULE | Refills: 0 | Status: CANCELLED | OUTPATIENT
Start: 2025-06-10 | End: 2026-06-10

## 2025-06-10 NOTE — TELEPHONE ENCOUNTER
Rx Refill Note  Requested Prescriptions     Pending Prescriptions Disp Refills    amphetamine-dextroamphetamine XR (Adderall XR) 10 MG 24 hr capsule 30 capsule 0     Sig: Take 1 capsule by mouth Daily        Last office visit with prescribing clinician: 12/19/2024     Next office visit with prescribing clinician: 6/16/2025     Office Visit with Fatmata Mari APRN (12/19/2024)     ToxAssure Flex 22, Ur w/DL - Urine, Random Void (12/19/2024 16:30)     BEHAVIORAL HEALTH - SCAN - Inspect report, [place]ar, 6/10/25 (06/10/2025)     Inspect Fill 5/13/25    Nikki Caruso  06/10/25, 09:51 EDT

## 2025-06-11 RX ORDER — DESVENLAFAXINE 50 MG/1
50 TABLET, FILM COATED, EXTENDED RELEASE ORAL DAILY
Qty: 30 TABLET | Refills: 1 | Status: CANCELLED | OUTPATIENT
Start: 2025-06-11

## 2025-06-11 RX ORDER — LURASIDONE HYDROCHLORIDE 20 MG/1
20 TABLET, FILM COATED ORAL
Qty: 30 TABLET | Refills: 0 | Status: CANCELLED | OUTPATIENT
Start: 2025-06-11

## 2025-06-11 RX ORDER — DEXTROAMPHETAMINE SACCHARATE, AMPHETAMINE ASPARTATE MONOHYDRATE, DEXTROAMPHETAMINE SULFATE AND AMPHETAMINE SULFATE 2.5; 2.5; 2.5; 2.5 MG/1; MG/1; MG/1; MG/1
10 CAPSULE, EXTENDED RELEASE ORAL DAILY
Qty: 30 CAPSULE | Refills: 0 | Status: SHIPPED | OUTPATIENT
Start: 2025-06-11 | End: 2025-06-16

## 2025-06-11 RX ORDER — DEXTROAMPHETAMINE SACCHARATE, AMPHETAMINE ASPARTATE MONOHYDRATE, DEXTROAMPHETAMINE SULFATE AND AMPHETAMINE SULFATE 2.5; 2.5; 2.5; 2.5 MG/1; MG/1; MG/1; MG/1
10 CAPSULE, EXTENDED RELEASE ORAL DAILY
Qty: 30 CAPSULE | Refills: 0 | Status: CANCELLED | OUTPATIENT
Start: 2025-06-11 | End: 2026-06-11

## 2025-06-11 NOTE — PROGRESS NOTES
[facility]   [address], [address]  [place] [address] [phone]  Fatmata Mari, MSN, APRN, PMHNP-BC      Subjective   Gregory ROSA Baez is a 24 y.o. female who presents today for follow up for psychiatric medication management.     Chief Complaint:  Bipolar disorder, anxiety     History of Present Illness:     Medication adjustments last visit:   Stopped Lexapro, started Pristiq  Continue Latuda 20mg.   Start Vyvanse 30mg daily in the morning.   Complete UDS. Complete Genesight.   Place on waiting list for therapy.      Patient here today for follow up. Reports she is doing well. She states the addition of the stimulant has been very helpful for focus. She states she is able to focus and her mind is very clear to her now.     She does notice that it wears off about lunch time. 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 Discussed adding a midday dose to see if this helped to resolve this.     Denies any SI/HI/AVH.       Past medical history: GERD, depression, anxiety, hyperlipidemia, chronic constipation, vitamin D deficiency  Past psychiatric history: depression, anxiety   Family history: mom's sister had schizophrenia, mom had bipolar and schizophrenia, 4 brothers--3 of them should be seen for some type of disorder, but don't get seen.   Social history: Patient is not a smoker, does not currently vape, does not report any alcohol use and does not report any recreational drug use.     Patient presents with symptoms and behaviors that are consistent with the following DSM-5 diagnoses:  Bipolar II disorder  2. Generalized anxiety disorder    The following portions of the patient's history were reviewed and updated as appropriate: allergies, current medications, past family history, past medical history, past social history, past surgical history and problem list.    PAST OUTPATIENT TREATMENT  Diagnosis treated:  Affective  "Disorder, Anxiety/Panic Disorder  Treatment Type:  Medication Management  Prior Psychiatric Medications:  Lexapro  Buspirone  Prozac-didn't work  Zoloft-made her like a zombie, didn't care about anything  Vraylar--felt unmotivated, started having passive SI  Support Groups:  None  Sequelae Of Mental Disorder:  job disruption, social isolation, family disruption, emotional distress    Interval History  Deteriorated     Side Effects  None      Past Medical History:  Past Medical History:   Diagnosis Date    Abnormal bowel habits     Anxiety     Asthma     childhood.    Depression     Dizziness     GERD (gastroesophageal reflux disease)     PTSD (post-traumatic stress disorder)     Substance abuse        Social History:  Social History     Socioeconomic History    Marital status: Single   Tobacco Use    Smoking status: Light Smoker     Types: Electronic Cigarette     Passive exposure: Past    Smokeless tobacco: Former    Tobacco comments:     weaning \"hasn't had cigarette in 3 days\"      Vaping - 1 vap cartridge last 2-3 weeks, nicotine   Vaping Use    Vaping status: Some Days    Last attempt to quit: 3/10/2023    Substances: Nicotine    Passive vaping exposure: Yes   Substance and Sexual Activity    Alcohol use: Yes     Alcohol/week: 2.0 standard drinks of alcohol     Types: 2 Glasses of wine per week     Comment: socially - 1 weekend out of the month    Drug use: Yes     Frequency: 4.0 times per week     Types: Marijuana     Comment: occasionally - quit when found she was pregnant    Sexual activity: Yes     Partners: Male     Birth control/protection: Condom, None       Family History:  Family History   Problem Relation Age of Onset    Drug abuse Mother     Hypertension Father     Diabetes Father     Hyperlipidemia Father     Alcohol abuse Father     Drug abuse Father     Hypertension Maternal Grandmother     Hyperlipidemia Maternal Grandmother     Stroke Maternal Grandmother     Alcohol abuse Maternal Grandfather  "    Stroke Maternal Grandfather     Hypertension Paternal Grandmother     Diabetes Paternal Grandmother     Hyperlipidemia Paternal Grandmother     Heart disease Paternal Grandmother     Cancer Paternal Grandmother     Hypertension Paternal Grandfather     Diabetes Paternal Grandfather     Hyperlipidemia Paternal Grandfather     Heart disease Paternal Grandfather     Stroke Paternal Grandfather        Past Surgical History:  Past Surgical History:   Procedure Laterality Date    ADENOIDECTOMY      COLONOSCOPY N/A 12/3/2020    Procedure: COLONOSCOPY with random colon biopsy;  Surgeon: Bear Caraballo MD;  Location: [facility] ENDOSCOPY;  Service: Gastroenterology;  Laterality: N/A;  Post op: normal colon, random comon biopsy    DENTAL PROCEDURE      ENDOSCOPY N/A 12/3/2020    Procedure: ESOPHAGOGASTRODUODENOSCOPY with biopsy x2;  Surgeon: Bear Caraballo MD;  Location: [facility] ENDOSCOPY;  Service: Gastroenterology;  Laterality: N/A;  post op: duodenitis, gastritis, biopsy x2    MYRINGOTOMY W/ TUBES      TONSILLECTOMY         Problem List:  Patient Active Problem List   Diagnosis    Palpitations    GERD (gastroesophageal reflux disease)    Other hyperlipidemia    Low grade squamous intraepithelial lesion on cytologic smear of cervix (LGSIL)    Elevated BP without diagnosis of hypertension    Obesity    Class 1 obesity due to excess calories with serious comorbidity and body mass index (BMI) of 33.0 to 33.9 in adult    Moderate episode of recurrent major depressive disorder    Vitamin D deficiency       Allergy:   Allergies   Allergen Reactions    Penicillins Hives and Nausea And Vomiting        Discontinued Medications:  Medications Discontinued During This Encounter   Medication Reason    escitalopram (LEXAPRO) 20 MG tablet     Lurasidone HCl (LATUDA) 20 MG tablet tablet Reorder    desvenlafaxine (Pristiq) 50 MG 24 hr tablet Reorder    Lurasidone HCl (LATUDA) 20 MG tablet tablet Reorder    desvenlafaxine (Pristiq) 50 MG  24 hr tablet Reorder    amphetamine-dextroamphetamine XR (Adderall XR) 10 MG 24 hr capsule     amphetamine-dextroamphetamine XR (Adderall XR) 15 MG 24 hr capsule          Current Medications:   Current Outpatient Medications   Medication Sig Dispense Refill    [START ON 8/11/2025] amphetamine-dextroamphetamine XR (Adderall XR) 15 MG 24 hr capsule Take 1 capsule in the morning, and one capsule in the early afternoon. 60 capsule 0    desvenlafaxine (Pristiq) 50 MG 24 hr tablet Take 1 tablet by mouth Daily. 30 tablet 5    Lurasidone HCl (LATUDA) 20 MG tablet tablet Take 1 tablet by mouth Daily With Dinner. 30 tablet 5    amphetamine-dextroamphetamine XR (Adderall XR) 15 MG 24 hr capsule Take 1 capsule in the morning, and one capsule in the early afternoon. 60 capsule 0    [START ON 7/14/2025] amphetamine-dextroamphetamine XR (Adderall XR) 15 MG 24 hr capsule Take 1 capsule in the morning, and one capsule in the early afternoon. 60 capsule 0    ondansetron ODT (ZOFRAN-ODT) 4 MG disintegrating tablet Place 1 tablet on the tongue Every 8 (Eight) Hours As Needed for Nausea or Vomiting. 30 tablet 0     No current facility-administered medications for this visit.     Physical Exam:   not currently breastfeeding.  MENTAL STATUS EXAM   General Appearance:  Cleanly groomed and dressed  Eye Contact:  Good eye contact  Attitude:  Cooperative  Motor Activity:  Normal gait, posture  Muscle Strength:  Normal  Speech:  Normal rate, tone, volume  Language:  Spontaneous  Mood and affect:  Depressed  Hopelessness:  Denies  Loneliness: Denies  Thought Process:  Logical and goal-directed  Associations/ Thought Content:  No delusions  Hallucinations:  None  Suicidal Ideations:  Not present  Homicidal Ideation:  Not present  Sensorium:  Alert  Orientation:  Person, place and time  Immediate Recall, Recent, and Remote Memory:  Intact  Attention Span/ Concentration:  Good  Fund of Knowledge:  Appropriate for age and educational  level  Intellectual Functioning:  Average range  Insight:  Fair  Judgement:  Fair  Reliability:  Fair  Impulse Control:  Fair     PHQ-9 Depression Screening  Little interest or pleasure in doing things? Not at all   Feeling down, depressed, or hopeless? Several days   PHQ-2 Total Score 1   Trouble falling or staying asleep, or sleeping too much? Several days   Feeling tired or having little energy? Several days   Poor appetite or overeating? Not at all   Feeling bad about yourself - or that you are a failure or have let yourself or your family down? Not at all   Trouble concentrating on things, such as reading the newspaper or watching television? Several days   Moving or speaking so slowly that other people could have noticed? Or the opposite - being so fidgety or restless that you have been moving around a lot more than usual? Not at all   Thoughts that you would be better off dead, or of hurting yourself in some way? Not at all   PHQ-9 Total Score 4   If you checked off any problems, how difficult have these problems made it for you to do your work, take care of things at home, or get along with other people? Not difficult at all      GAD7 Documentation:  Feeling nervous, anxious or on edge 0   Not being able to stop or control worrying 1   Worrying too much about different things 1   Trouble relaxing 0   Being so restless that it is hard to sit still 0   Becoming easily annoyed or irritable 1   Feeling Afraid as if something awful might happen 1   MAAME Total Score 4   How difficult have these problems made it for you? Not difficult at all            Former smoker    I advised Gregory of the risks of tobacco use.     Result Review:    Labs:  No visits with results within 3 Month(s) from this visit.   Latest known visit with results is:   Office Visit on 12/19/2024   Component Date Value Ref Range Status    Report Summary 12/19/2024 FINAL   Final    Comment:  ====================================================================  Cannabinoids, MS, Ur RFX  ToxAssure Flex 22, Ur w/DL  ====================================================================  Test                             Result       Flag       Units  Drug Present and Declared for Prescription Verification    Carboxy-THC                    37           EXPECTED   ng/mg creat     Carboxy-THC is a metabolite of tetrahydrocannabinol (THC). Source     of THC is most commonly herbal marijuana or marijuana-based     products, but THC is also present in a scheduled prescription     medication. Trace amounts of THC can be present in hemp and     cannabidiol (CBD) products. This test is not intended to     distinguish between delta-9-tetrahydrocannabinol, the predominant     form of THC in most herbal or marijuana-based products, and     delta-8-tetrahydrocannabinol.    Citalopram                     PRESENT      EXPECTED    Desmethylcitalopram            PRESENT      EXPECTED     Desmeth                           ylcitalopram is an expected metabolite of citalopram or     the enantiomeric form, escitalopram.  Drug Absent but Declared for Prescription Verification    Lurasidone                     Not Detected UNEXPECTED  ====================================================================  Test                      Result    Flag   Units      Ref Range    Creatinine              78               mg/dL      >=20  ====================================================================  Declared Medications:   The flagging and interpretation on this report are based on the   following declared medications.  Unexpected results may arise from   inaccuracies in the declared medications.   **Note: The testing scope of this panel includes these medications:   Escitalopram (Lexapro)   Lurasidone (Latuda)   Marijuana   **Note: The testing scope of this panel does not include following   reported medications:   Brompheniramine    Ondansetron (Zofran)  ==========================================================                           ==========  For clinical consultation, please call [phone].  ====================================================================      CREATININE 12/19/2024 78  mg/dL Final    REFERENCE RANGE: Ref Range>=20    Amphetamines, IA 12/19/2024 Negative  CUTOFF:300 ng/mL Final    Benzodiazepines 12/19/2024 Negative   Final    Diazepam Urine, Qualitative 12/19/2024 Not Detected  ng/mg creat Final    Desmethyldiazepam 12/19/2024 Not Detected  ng/mg creat Final    Oxazepam, urine 12/19/2024 Not Detected  ng/mg creat Final    Temazepam 12/19/2024 Not Detected  ng/mg creat Final    Comment: Expected metabolism of benzodiazepine class drugs:   Parent Drug       Detected Metabolites   -----------       --------------------   Diazepam:         Desmethyldiazepam, Temazepam, Oxazepam   Chlordiazepoxide: Desmethyldiazepam, Oxazepam   Clorazepate:      Desmethyldiazepam, Oxazepam   Halazepam:        Desmethyldiazepam, Oxazepam   Temazepam:        Oxazepam   Oxazepam:         None      Alprazolam Urine, Conf 12/19/2024 Not Detected  ng/mg creat Final    Alpha-hydroxyalprazolam, Urine 12/19/2024 Not Detected  ng/mg creat Final    Desalkylflurazepam, Urine 12/19/2024 Not Detected  ng/mg creat Final    Lorazepam, Urine 12/19/2024 Not Detected  ng/mg creat Final    Alpha-hydroxytriazolam, Urine 12/19/2024 Not Detected  ng/mg creat Final    Clonazepam 12/19/2024 Not Detected  ng/mg creat Final    7- AMINOCLONAZEPAM 12/19/2024 Not Detected  ng/mg creat Final    Midazolam, Urine 12/19/2024 Not Detected  ng/mg creat Final    Alpha-hydroxymidazolam, Urine 12/19/2024 Not Detected  ng/mg creat Final    Flunitrazepam 12/19/2024 Not Detected  ng/mg creat Final    DESMETHYLFLUNITRAZEPAM 12/19/2024 Not Detected  ng/mg creat Final    COCAINE / METABOLITE, IA 12/19/2024 Negative  CUTOFF:150 ng/mL Final    Ethanol and Ethanol Biomarkers  12/19/2024 Negative  CUTOFF:500 ng/mL Final    Cannavinoids IA 12/19/2024 Comment  CUTOFF:20 ng/mL Final    Further testing indicated    6-Acetylmorphine IA 12/19/2024 Negative  CUTOFF:10 ng/mL Final    Opiate Class IA 12/19/2024 Negative  CUTOFF:100 ng/mL Final    Oxycodone Class IA 12/19/2024 Negative  CUTOFF:100 ng/mL Final    METHADONE, IA 12/19/2024 Negative  CUTOFF:100 ng/mL Final    Methadone MTB IA 12/19/2024 Negative  CUTOFF:100 ng/mL Final    BUPRENORPHINE IA 12/19/2024 Negative  CUTOFF:5.0 ng/mL Final    FENTANYL, IA 12/19/2024 Negative  CUTOFF:2.0 ng/mL Final    Tapentadol, IA 12/19/2024 Negative  CUTOFF:200 ng/mL Final    PROPOXYPHENE, IA 12/19/2024 Negative  CUTOFF:300 ng/mL Final    TRAMADOL, IA 12/19/2024 Negative  CUTOFF:200 ng/mL Final    METHYLPHENIDATE IA 12/19/2024 Negative  CUTOFF:100 ng/mL Final    Barbiturates, IA 12/19/2024 Negative  CUTOFF:200 ng/mL Final    PHENCYCLIDINE, IA 12/19/2024 Negative  CUTOFF:25 ng/mL Final    ANTICONVULSANTS 12/19/2024 Negative   Final    Pregabalin 12/19/2024 Not Detected   Final    Gabapentin, IA 12/19/2024 Negative  CUTOFF:1.0 ug/mL Final    Carisoprodol, IA 12/19/2024 Negative  CUTOFF:100 ng/mL Final    Antidepressants 12/19/2024 +POSITIVE+   Final    Amitriptyline 12/19/2024 Not Detected   Final    Amoxapine 12/19/2024 Not Detected   Final    8-Hydroxyamoxapine, Ur 12/19/2024 Not Detected   Final    Bupropion, Ur 12/19/2024 Not Detected   Final    Hydroxybupropion 12/19/2024 Not Detected   Final    Citalopram 12/19/2024 PRESENT   Final    Desmethylcitalopram 12/19/2024 PRESENT   Final    Clomipramine, Ur 12/19/2024 Not Detected   Final    Desmethylclomipramine 12/19/2024 Not Detected   Final    Desipramine 12/19/2024 Not Detected   Final    Doxepin 12/19/2024 Not Detected   Final    Desmethyldoxepin, Ur 12/19/2024 Not Detected   Final    Duloxetine, Ur 12/19/2024 Not Detected   Final    Fluoxetine, Ur 12/19/2024 Not Detected   Final    Norfluoxetine, Ur  12/19/2024 Not Detected   Final    Fluvoxamine 12/19/2024 Not Detected   Final    Imipramine 12/19/2024 Not Detected   Final    Mirtazapine 12/19/2024 Not Detected   Final    Nortriptyline 12/19/2024 Not Detected   Final    Paroxetine 12/19/2024 Not Detected   Final    Protriptyline 12/19/2024 Not Detected   Final    Sertraline, Ur 12/19/2024 Not Detected   Final    Desmethylsertraline 12/19/2024 Not Detected   Final    Maprotiline 12/19/2024 Not Detected   Final    Nefazodone, Ur 12/19/2024 Not Detected   Final    Trazodone 12/19/2024 Not Detected   Final    1,3 chlorophenyl piperazine 12/19/2024 Not Detected   Final    Trimipramine 12/19/2024 Not Detected   Final    Venlafaxine 12/19/2024 Not Detected   Final    Desmethylvenlafaxine, Ur 12/19/2024 Not Detected   Final    Vilazodone, Ur 12/19/2024 Not Detected   Final    Antipsychotics, Ur 12/19/2024 Negative   Final    Chlorpromazine 12/19/2024 Not Detected   Final    Clozapine, Ur 12/19/2024 Not Detected   Final    Desmethylclozapine, Ur 12/19/2024 Not Detected   Final    Loxapine, Ur 12/19/2024 Not Detected   Final    8-Hydroxyloxapine 12/19/2024 Not Detected   Final    Mesoridazine 12/19/2024 Not Detected   Final    Olanzapine 12/19/2024 Not Detected   Final    Quetiapine 12/19/2024 Not Detected   Final    Risperidone 12/19/2024 Not Detected   Final    Fluphenazine 12/19/2024 Not Detected   Final    Haloperidol 12/19/2024 Not Detected   Final    THIORIDAZINE, UR 12/19/2024 Not Detected   Final    Molindone, Ur 12/19/2024 Not Detected   Final    Pimozide, Ur 12/19/2024 Not Detected   Final    Prochlorperazine, Ur 12/19/2024 Not Detected   Final    Thiothixene 12/19/2024 Not Detected   Final    Trifluoperazine 12/19/2024 Not Detected   Final    Ziprasidone 12/19/2024 Not Detected   Final    Perphenazine, Ur 12/19/2024 Not Detected   Final    Aripiprazole 12/19/2024 Not Detected   Final    Asenapine 12/19/2024 Not Detected   Final    Iloperidone 12/19/2024 Not  Detected   Final    Lurasidone 12/19/2024 Not Detected   Final    KRATOM IA 12/19/2024 Negative  CUTOFF:5.0 ng/mL Final    Cannabinoid Confirmation 12/19/2024 +POSITIVE+   Final    Carboxy THC 12/19/2024 37  ng/mg creat Final    Comment: This test is not intended to distinguish between the metabolites of  delta-9-tetrahydrocannabinol, the predominant form of THC in most  herbal or marijuana-based products, and delta-8-tetrahydrocannabinol,  a psychoactive compound generally synthesized from other  cannabinoids.         Assessment & Plan   Diagnoses and all orders for this visit:    1. Moderate episode of recurrent major depressive disorder (Primary)  -     Discontinue: Lurasidone HCl (LATUDA) 20 MG tablet tablet; Take 1 tablet by mouth Daily With Dinner.  Dispense: 30 tablet; Refill: 5  -     Discontinue: desvenlafaxine (Pristiq) 50 MG 24 hr tablet; Take 1 tablet by mouth Daily.  Dispense: 30 tablet; Refill: 5  -     desvenlafaxine (Pristiq) 50 MG 24 hr tablet; Take 1 tablet by mouth Daily.  Dispense: 30 tablet; Refill: 5  -     Lurasidone HCl (LATUDA) 20 MG tablet tablet; Take 1 tablet by mouth Daily With Dinner.  Dispense: 30 tablet; Refill: 5    2. Generalized anxiety disorder  -     Discontinue: Lurasidone HCl (LATUDA) 20 MG tablet tablet; Take 1 tablet by mouth Daily With Dinner.  Dispense: 30 tablet; Refill: 5  -     Discontinue: desvenlafaxine (Pristiq) 50 MG 24 hr tablet; Take 1 tablet by mouth Daily.  Dispense: 30 tablet; Refill: 5  -     desvenlafaxine (Pristiq) 50 MG 24 hr tablet; Take 1 tablet by mouth Daily.  Dispense: 30 tablet; Refill: 5  -     Lurasidone HCl (LATUDA) 20 MG tablet tablet; Take 1 tablet by mouth Daily With Dinner.  Dispense: 30 tablet; Refill: 5    3. ADHD (attention deficit hyperactivity disorder), inattentive type  -     amphetamine-dextroamphetamine XR (Adderall XR) 15 MG 24 hr capsule; Take 1 capsule in the morning, and one capsule in the early afternoon.  Dispense: 60 capsule;  Refill: 0  -     amphetamine-dextroamphetamine XR (Adderall XR) 15 MG 24 hr capsule; Take 1 capsule in the morning, and one capsule in the early afternoon.  Dispense: 60 capsule; Refill: 0  -     Discontinue: amphetamine-dextroamphetamine XR (Adderall XR) 15 MG 24 hr capsule; Take 1 capsule in the morning, and one capsule in the early afternoon.  Dispense: 30 capsule; Refill: 0  -     amphetamine-dextroamphetamine XR (Adderall XR) 15 MG 24 hr capsule; Take 1 capsule in the morning, and one capsule in the early afternoon.  Dispense: 60 capsule; Refill: 0        Continue Lexapro 20mg.   Continue Latuda 20mg.   Continue Adderall XR 15mg, add second dose at midday. Sent fills for June, July, and August.     Visit Diagnoses:    ICD-10-CM ICD-9-CM   1. Moderate episode of recurrent major depressive disorder  F33.1 296.32   2. Generalized anxiety disorder  F41.1 300.02   3. ADHD (attention deficit hyperactivity disorder), inattentive type  F90.0 314.00             TREATMENT PLAN/GOALS: Continue supportive psychotherapy efforts and medications as indicated. Treatment and medication options discussed during today's visit. Patient ackowledged and verbally consented to continue with current treatment plan and was educated on the importance of compliance with treatment and follow-up appointments.    MEDICATION ISSUES:  INSPECT reviewed as expected    Discussed medication options and treatment plan of prescribed medication as well as the risks, benefits, and side effects including potential falls, possible impaired driving and metabolic adversities among others. Patient is agreeable to call the office with any worsening of symptoms or onset of side effects. Patient is agreeable to call 911 or go to the nearest ER should he/she begin having SI/HI. No medication side effects or related complaints today.     MEDS ORDERED DURING VISIT:  New Medications Ordered This Visit   Medications    desvenlafaxine (Pristiq) 50 MG 24 hr tablet      Sig: Take 1 tablet by mouth Daily.     Dispense:  30 tablet     Refill:  5    Lurasidone HCl (LATUDA) 20 MG tablet tablet     Sig: Take 1 tablet by mouth Daily With Dinner.     Dispense:  30 tablet     Refill:  5    amphetamine-dextroamphetamine XR (Adderall XR) 15 MG 24 hr capsule     Sig: Take 1 capsule in the morning, and one capsule in the early afternoon.     Dispense:  60 capsule     Refill:  0     Ok to fill early. Patient is increasing dose, and adding afternoon dose.    amphetamine-dextroamphetamine XR (Adderall XR) 15 MG 24 hr capsule     Sig: Take 1 capsule in the morning, and one capsule in the early afternoon.     Dispense:  60 capsule     Refill:  0     July fill    amphetamine-dextroamphetamine XR (Adderall XR) 15 MG 24 hr capsule     Sig: Take 1 capsule in the morning, and one capsule in the early afternoon.     Dispense:  60 capsule     Refill:  0     August fill. Previously sent script incorrectly for quantity 30. Should be 60.       Return in about 6 months (around 12/16/2025).         This document has been electronically signed by LIMA Kinsey  June 18, 2025 15:00 EDT    Part of this note may be an electronic transcription/translation of spoken language to printed text using the Dragon Dictation System.

## 2025-06-16 ENCOUNTER — OFFICE VISIT (OUTPATIENT)
Dept: PSYCHIATRY | Facility: CLINIC | Age: 25
End: 2025-06-16
Payer: MEDICAID

## 2025-06-16 DIAGNOSIS — F33.1 MODERATE EPISODE OF RECURRENT MAJOR DEPRESSIVE DISORDER: Primary | Chronic | ICD-10-CM

## 2025-06-16 DIAGNOSIS — F90.0 ADHD (ATTENTION DEFICIT HYPERACTIVITY DISORDER), INATTENTIVE TYPE: Chronic | ICD-10-CM

## 2025-06-16 DIAGNOSIS — F41.1 GENERALIZED ANXIETY DISORDER: Chronic | ICD-10-CM

## 2025-06-16 RX ORDER — DEXTROAMPHETAMINE SACCHARATE, AMPHETAMINE ASPARTATE MONOHYDRATE, DEXTROAMPHETAMINE SULFATE AND AMPHETAMINE SULFATE 3.75; 3.75; 3.75; 3.75 MG/1; MG/1; MG/1; MG/1
CAPSULE, EXTENDED RELEASE ORAL
Qty: 30 CAPSULE | Refills: 0 | Status: SHIPPED | OUTPATIENT
Start: 2025-08-11 | End: 2025-06-18

## 2025-06-16 RX ORDER — DEXTROAMPHETAMINE SACCHARATE, AMPHETAMINE ASPARTATE MONOHYDRATE, DEXTROAMPHETAMINE SULFATE AND AMPHETAMINE SULFATE 3.75; 3.75; 3.75; 3.75 MG/1; MG/1; MG/1; MG/1
CAPSULE, EXTENDED RELEASE ORAL
Qty: 60 CAPSULE | Refills: 0 | Status: SHIPPED | OUTPATIENT
Start: 2025-06-16

## 2025-06-16 RX ORDER — DEXTROAMPHETAMINE SACCHARATE, AMPHETAMINE ASPARTATE MONOHYDRATE, DEXTROAMPHETAMINE SULFATE AND AMPHETAMINE SULFATE 3.75; 3.75; 3.75; 3.75 MG/1; MG/1; MG/1; MG/1
CAPSULE, EXTENDED RELEASE ORAL
Qty: 60 CAPSULE | Refills: 0 | Status: SHIPPED | OUTPATIENT
Start: 2025-07-14

## 2025-06-16 RX ORDER — DESVENLAFAXINE 50 MG/1
50 TABLET, FILM COATED, EXTENDED RELEASE ORAL DAILY
Qty: 30 TABLET | Refills: 5 | Status: SHIPPED | OUTPATIENT
Start: 2025-06-16

## 2025-06-16 RX ORDER — LURASIDONE HYDROCHLORIDE 20 MG/1
20 TABLET, FILM COATED ORAL
Qty: 30 TABLET | Refills: 5 | Status: SHIPPED | OUTPATIENT
Start: 2025-06-16 | End: 2025-06-16 | Stop reason: SDUPTHER

## 2025-06-16 RX ORDER — LURASIDONE HYDROCHLORIDE 20 MG/1
20 TABLET, FILM COATED ORAL
Qty: 30 TABLET | Refills: 5 | Status: SHIPPED | OUTPATIENT
Start: 2025-06-16

## 2025-06-16 RX ORDER — DESVENLAFAXINE 50 MG/1
50 TABLET, FILM COATED, EXTENDED RELEASE ORAL DAILY
Qty: 30 TABLET | Refills: 5 | Status: SHIPPED | OUTPATIENT
Start: 2025-06-16 | End: 2025-06-16 | Stop reason: SDUPTHER

## 2025-06-18 RX ORDER — DEXTROAMPHETAMINE SACCHARATE, AMPHETAMINE ASPARTATE MONOHYDRATE, DEXTROAMPHETAMINE SULFATE AND AMPHETAMINE SULFATE 3.75; 3.75; 3.75; 3.75 MG/1; MG/1; MG/1; MG/1
CAPSULE, EXTENDED RELEASE ORAL
Qty: 60 CAPSULE | Refills: 0 | Status: SHIPPED | OUTPATIENT
Start: 2025-08-11

## 2025-07-22 DIAGNOSIS — F90.0 ADHD (ATTENTION DEFICIT HYPERACTIVITY DISORDER), INATTENTIVE TYPE: Chronic | ICD-10-CM

## 2025-07-22 NOTE — TELEPHONE ENCOUNTER
Rx Refill Note  Requested Prescriptions     Pending Prescriptions Disp Refills    amphetamine-dextroamphetamine XR (Adderall XR) 15 MG 24 hr capsule 60 capsule 0     Sig: Take 1 capsule in the morning, and one capsule in the early afternoon.      Last office visit with prescribing clinician: 6/16/2025     Office Visit with Fatmata Mari APRN (06/16/2025)     Cannabinoids, MS, Ur RFX - (12/19/2024 16:30)  ToxAssure Flex 22, Ur w/DL - Urine, Random Void (12/19/2024 16:30)    BEHAVIORAL HEALTH - SCAN - Inspect, 7-, kobi (07/22/2025)     [facility] IS GOING TO CHARGE PATIENT $398.00.  SCRIPT CANCELLED PER PAUL, PHARM TECH.  PLEASE SEND TO [facility]RISTOP[facility] PALOMARES.    Last Inspect adderall XR 15 mg filled 6- & sold 6-, QTY 60, days 30    Trinity Grove MA  07/22/25, 11:45 EDT

## 2025-07-23 RX ORDER — DEXTROAMPHETAMINE SACCHARATE, AMPHETAMINE ASPARTATE MONOHYDRATE, DEXTROAMPHETAMINE SULFATE AND AMPHETAMINE SULFATE 3.75; 3.75; 3.75; 3.75 MG/1; MG/1; MG/1; MG/1
CAPSULE, EXTENDED RELEASE ORAL
Qty: 60 CAPSULE | Refills: 0 | Status: SHIPPED | OUTPATIENT
Start: 2025-07-23

## 2025-08-22 DIAGNOSIS — F90.0 ADHD (ATTENTION DEFICIT HYPERACTIVITY DISORDER), INATTENTIVE TYPE: Chronic | ICD-10-CM

## 2025-08-22 RX ORDER — DEXTROAMPHETAMINE SACCHARATE, AMPHETAMINE ASPARTATE MONOHYDRATE, DEXTROAMPHETAMINE SULFATE AND AMPHETAMINE SULFATE 3.75; 3.75; 3.75; 3.75 MG/1; MG/1; MG/1; MG/1
CAPSULE, EXTENDED RELEASE ORAL
Qty: 60 CAPSULE | Refills: 0 | Status: SHIPPED | OUTPATIENT
Start: 2025-08-22

## (undated) DEVICE — PK ENDO GI 50

## (undated) DEVICE — BITEBLOCK ENDO W/STRAP 60F A/ LF DISP

## (undated) DEVICE — SINGLE-USE BIOPSY FORCEPS: Brand: RADIAL JAW 4

## (undated) DEVICE — PAPR PRNT PK SONY W RIBN UPC55